# Patient Record
Sex: MALE | Race: BLACK OR AFRICAN AMERICAN | Employment: UNEMPLOYED
[De-identification: names, ages, dates, MRNs, and addresses within clinical notes are randomized per-mention and may not be internally consistent; named-entity substitution may affect disease eponyms.]

---

## 2017-01-03 RX ORDER — OXYCODONE AND ACETAMINOPHEN 7.5; 325 MG/1; MG/1
1 TABLET ORAL EVERY 6 HOURS PRN
Qty: 120 TABLET | Refills: 0 | Status: SHIPPED | OUTPATIENT
Start: 2017-01-03 | End: 2017-02-02

## 2017-01-04 ENCOUNTER — TELEPHONE (OUTPATIENT)
Dept: ONCOLOGY | Facility: CLINIC | Age: 22
End: 2017-01-04

## 2017-01-31 ENCOUNTER — OFFICE VISIT (OUTPATIENT)
Dept: ONCOLOGY | Facility: CLINIC | Age: 22
End: 2017-01-31

## 2017-01-31 ENCOUNTER — TELEPHONE (OUTPATIENT)
Dept: ONCOLOGY | Facility: CLINIC | Age: 22
End: 2017-01-31

## 2017-01-31 VITALS — TEMPERATURE: 97.9 F | HEART RATE: 69 BPM | DIASTOLIC BLOOD PRESSURE: 79 MMHG | SYSTOLIC BLOOD PRESSURE: 140 MMHG

## 2017-01-31 DIAGNOSIS — D57.219: Primary | ICD-10-CM

## 2017-01-31 DIAGNOSIS — D57.00 HB-SS DISEASE WITH CRISIS (HCC): ICD-10-CM

## 2017-01-31 PROCEDURE — 99214 OFFICE O/P EST MOD 30 MIN: CPT | Performed by: INTERNAL MEDICINE

## 2017-01-31 RX ORDER — OXYCODONE AND ACETAMINOPHEN 7.5; 325 MG/1; MG/1
1 TABLET ORAL EVERY 6 HOURS PRN
Qty: 100 TABLET | Refills: 0 | Status: CANCELLED | OUTPATIENT
Start: 2017-01-31 | End: 2017-03-02

## 2017-01-31 RX ORDER — OXYCODONE AND ACETAMINOPHEN 7.5; 325 MG/1; MG/1
1 TABLET ORAL EVERY 6 HOURS PRN
Qty: 120 TABLET | Refills: 0 | OUTPATIENT
Start: 2017-01-31 | End: 2017-03-02

## 2017-02-20 ENCOUNTER — HOSPITAL ENCOUNTER (OUTPATIENT)
Facility: MEDICAL CENTER | Age: 22
End: 2017-02-20
Payer: COMMERCIAL

## 2017-04-28 ENCOUNTER — HOSPITAL ENCOUNTER (OUTPATIENT)
Facility: MEDICAL CENTER | Age: 22
End: 2017-04-28
Payer: COMMERCIAL

## 2017-06-05 ENCOUNTER — HOSPITAL ENCOUNTER (OUTPATIENT)
Facility: MEDICAL CENTER | Age: 22
End: 2017-06-05

## 2017-06-05 DIAGNOSIS — D57.00 SICKLE CELL PAIN CRISIS (HCC): Primary | ICD-10-CM

## 2017-06-06 ENCOUNTER — TELEPHONE (OUTPATIENT)
Dept: ONCOLOGY | Age: 22
End: 2017-06-06

## 2017-08-14 ENCOUNTER — HOSPITAL ENCOUNTER (OUTPATIENT)
Facility: MEDICAL CENTER | Age: 22
End: 2017-08-14

## 2017-08-29 ENCOUNTER — HOSPITAL ENCOUNTER (OUTPATIENT)
Facility: MEDICAL CENTER | Age: 22
End: 2017-08-29

## 2018-10-23 ENCOUNTER — HOSPITAL ENCOUNTER (INPATIENT)
Age: 23
LOS: 3 days | Discharge: HOME | DRG: 139 | End: 2018-10-27
Attending: EMERGENCY MEDICINE | Admitting: INTERNAL MEDICINE
Payer: MEDICAID

## 2018-10-23 DIAGNOSIS — J18.9 PNEUMONIA DUE TO ORGANISM: Primary | ICD-10-CM

## 2018-10-23 LAB
ABSOLUTE RETIC #: 0.17 M/UL (ref 0.03–0.08)
EKG ATRIAL RATE: 69 BPM
EKG P AXIS: 60 DEGREES
EKG P-R INTERVAL: 162 MS
EKG Q-T INTERVAL: 370 MS
EKG QRS DURATION: 80 MS
EKG QTC CALCULATION (BAZETT): 378 MS
EKG R AXIS: 17 DEGREES
EKG T AXIS: 60 DEGREES
EKG VENTRICULAR RATE: 63 BPM
IMMATURE RETIC FRACT: 16.9 % (ref 2.7–18.3)
LACTIC ACID, WHOLE BLOOD: 1.4 MMOL/L (ref 0.7–2.1)
POC TROPONIN I: 0 NG/ML (ref 0–0.1)
POC TROPONIN INTERP: NORMAL
RETIC %: 3.9 % (ref 0.5–1.9)
RETIC HEMOGLOBIN: 32.7 PG (ref 28.2–35.7)

## 2018-10-23 PROCEDURE — 83615 LACTATE (LD) (LDH) ENZYME: CPT

## 2018-10-23 PROCEDURE — 84484 ASSAY OF TROPONIN QUANT: CPT

## 2018-10-23 PROCEDURE — 93005 ELECTROCARDIOGRAM TRACING: CPT

## 2018-10-23 PROCEDURE — 83605 ASSAY OF LACTIC ACID: CPT

## 2018-10-23 PROCEDURE — 2580000003 HC RX 258: Performed by: EMERGENCY MEDICINE

## 2018-10-23 PROCEDURE — 85025 COMPLETE CBC W/AUTO DIFF WBC: CPT

## 2018-10-23 PROCEDURE — 85045 AUTOMATED RETICULOCYTE COUNT: CPT

## 2018-10-23 PROCEDURE — 80048 BASIC METABOLIC PNL TOTAL CA: CPT

## 2018-10-23 PROCEDURE — 96374 THER/PROPH/DIAG INJ IV PUSH: CPT

## 2018-10-23 PROCEDURE — 6360000002 HC RX W HCPCS: Performed by: EMERGENCY MEDICINE

## 2018-10-23 PROCEDURE — 99285 EMERGENCY DEPT VISIT HI MDM: CPT

## 2018-10-23 RX ORDER — 0.9 % SODIUM CHLORIDE 0.9 %
1000 INTRAVENOUS SOLUTION INTRAVENOUS ONCE
Status: COMPLETED | OUTPATIENT
Start: 2018-10-23 | End: 2018-10-24

## 2018-10-23 RX ORDER — MORPHINE SULFATE 4 MG/ML
4 INJECTION, SOLUTION INTRAMUSCULAR; INTRAVENOUS ONCE
Status: COMPLETED | OUTPATIENT
Start: 2018-10-23 | End: 2018-10-23

## 2018-10-23 RX ADMIN — SODIUM CHLORIDE 1000 ML: 9 INJECTION, SOLUTION INTRAVENOUS at 22:45

## 2018-10-23 RX ADMIN — MORPHINE SULFATE 4 MG: 4 INJECTION INTRAVENOUS at 22:47

## 2018-10-23 ASSESSMENT — PAIN DESCRIPTION - ONSET: ONSET: GRADUAL

## 2018-10-23 ASSESSMENT — PAIN SCALES - GENERAL
PAINLEVEL_OUTOF10: 8
PAINLEVEL_OUTOF10: 8

## 2018-10-23 ASSESSMENT — PAIN DESCRIPTION - DESCRIPTORS: DESCRIPTORS: SHARP

## 2018-10-23 ASSESSMENT — PAIN DESCRIPTION - ORIENTATION: ORIENTATION: RIGHT

## 2018-10-23 ASSESSMENT — PAIN DESCRIPTION - PAIN TYPE: TYPE: ACUTE PAIN

## 2018-10-23 ASSESSMENT — PAIN DESCRIPTION - FREQUENCY: FREQUENCY: CONTINUOUS

## 2018-10-23 ASSESSMENT — PAIN DESCRIPTION - PROGRESSION: CLINICAL_PROGRESSION: GRADUALLY WORSENING

## 2018-10-23 ASSESSMENT — PAIN DESCRIPTION - LOCATION: LOCATION: CHEST;RIB CAGE

## 2018-10-24 ENCOUNTER — APPOINTMENT (OUTPATIENT)
Dept: CT IMAGING | Age: 23
DRG: 139 | End: 2018-10-24
Payer: MEDICAID

## 2018-10-24 PROBLEM — Z72.0 TOBACCO ABUSE: Status: ACTIVE | Noted: 2018-10-24

## 2018-10-24 PROBLEM — Z86.711 HISTORY OF PULMONARY EMBOLISM: Status: ACTIVE | Noted: 2018-10-24

## 2018-10-24 PROBLEM — J18.9 PNEUMONIA: Status: ACTIVE | Noted: 2018-10-24

## 2018-10-24 LAB
ABSOLUTE EOS #: 0 K/UL (ref 0–0.4)
ABSOLUTE IMMATURE GRANULOCYTE: 0 K/UL (ref 0–0.3)
ABSOLUTE LYMPH #: 1.79 K/UL (ref 1–4.8)
ABSOLUTE MONO #: 0.97 K/UL (ref 0.1–0.8)
ANION GAP SERPL CALCULATED.3IONS-SCNC: 14 MMOL/L (ref 9–17)
BASOPHILS # BLD: 1 % (ref 0–2)
BASOPHILS ABSOLUTE: 0.14 K/UL (ref 0–0.2)
BUN BLDV-MCNC: 5 MG/DL (ref 6–20)
BUN/CREAT BLD: ABNORMAL (ref 9–20)
CALCIUM SERPL-MCNC: 9.1 MG/DL (ref 8.6–10.4)
CHLORIDE BLD-SCNC: 98 MMOL/L (ref 98–107)
CO2: 25 MMOL/L (ref 20–31)
CREAT SERPL-MCNC: 0.67 MG/DL (ref 0.7–1.2)
DIFFERENTIAL TYPE: ABNORMAL
DIRECT EXAM: NORMAL
EOSINOPHILS RELATIVE PERCENT: 0 % (ref 1–4)
GFR AFRICAN AMERICAN: >60 ML/MIN
GFR NON-AFRICAN AMERICAN: >60 ML/MIN
GFR SERPL CREATININE-BSD FRML MDRD: ABNORMAL ML/MIN/{1.73_M2}
GFR SERPL CREATININE-BSD FRML MDRD: ABNORMAL ML/MIN/{1.73_M2}
GLUCOSE BLD-MCNC: 95 MG/DL (ref 70–99)
HCT VFR BLD CALC: 36.1 % (ref 40.7–50.3)
HEMOGLOBIN: 13.4 G/DL (ref 13–17)
IMMATURE GRANULOCYTES: 0 %
LACTATE DEHYDROGENASE: 345 U/L (ref 135–225)
LYMPHOCYTES # BLD: 13 % (ref 24–44)
Lab: NORMAL
MCH RBC QN AUTO: 30.9 PG (ref 25.2–33.5)
MCHC RBC AUTO-ENTMCNC: 37.1 G/DL (ref 28.4–34.8)
MCV RBC AUTO: 83.2 FL (ref 82.6–102.9)
MONOCYTES # BLD: 7 % (ref 1–7)
MORPHOLOGY: ABNORMAL
NRBC AUTOMATED: 0.5 PER 100 WBC
PDW BLD-RTO: 14.6 % (ref 11.8–14.4)
PLATELET # BLD: 195 K/UL (ref 138–453)
PLATELET ESTIMATE: ABNORMAL
PMV BLD AUTO: 12.4 FL (ref 8.1–13.5)
POC TROPONIN I: 0 NG/ML (ref 0–0.1)
POC TROPONIN INTERP: NORMAL
POTASSIUM SERPL-SCNC: 4.1 MMOL/L (ref 3.7–5.3)
RBC # BLD: 4.34 M/UL (ref 4.21–5.77)
RBC # BLD: ABNORMAL 10*6/UL
SEG NEUTROPHILS: 79 % (ref 36–66)
SEGMENTED NEUTROPHILS ABSOLUTE COUNT: 10.9 K/UL (ref 1.8–7.7)
SODIUM BLD-SCNC: 137 MMOL/L (ref 135–144)
SPECIMEN DESCRIPTION: NORMAL
STATUS: NORMAL
WBC # BLD: 13.8 K/UL (ref 3.5–11.3)
WBC # BLD: ABNORMAL 10*3/UL

## 2018-10-24 PROCEDURE — 6360000002 HC RX W HCPCS: Performed by: EMERGENCY MEDICINE

## 2018-10-24 PROCEDURE — 6370000000 HC RX 637 (ALT 250 FOR IP): Performed by: NURSE PRACTITIONER

## 2018-10-24 PROCEDURE — 99223 1ST HOSP IP/OBS HIGH 75: CPT | Performed by: INTERNAL MEDICINE

## 2018-10-24 PROCEDURE — G8989 SELF CARE D/C STATUS: HCPCS

## 2018-10-24 PROCEDURE — 2060000000 HC ICU INTERMEDIATE R&B

## 2018-10-24 PROCEDURE — 94760 N-INVAS EAR/PLS OXIMETRY 1: CPT

## 2018-10-24 PROCEDURE — 87040 BLOOD CULTURE FOR BACTERIA: CPT

## 2018-10-24 PROCEDURE — G8987 SELF CARE CURRENT STATUS: HCPCS

## 2018-10-24 PROCEDURE — 97535 SELF CARE MNGMENT TRAINING: CPT

## 2018-10-24 PROCEDURE — 71260 CT THORAX DX C+: CPT

## 2018-10-24 PROCEDURE — 2580000003 HC RX 258: Performed by: EMERGENCY MEDICINE

## 2018-10-24 PROCEDURE — 6370000000 HC RX 637 (ALT 250 FOR IP): Performed by: INTERNAL MEDICINE

## 2018-10-24 PROCEDURE — 96375 TX/PRO/DX INJ NEW DRUG ADDON: CPT

## 2018-10-24 PROCEDURE — 6360000002 HC RX W HCPCS: Performed by: NURSE PRACTITIONER

## 2018-10-24 PROCEDURE — 87205 SMEAR GRAM STAIN: CPT

## 2018-10-24 PROCEDURE — 99254 IP/OBS CNSLTJ NEW/EST MOD 60: CPT | Performed by: INTERNAL MEDICINE

## 2018-10-24 PROCEDURE — 36415 COLL VENOUS BLD VENIPUNCTURE: CPT

## 2018-10-24 PROCEDURE — 6360000002 HC RX W HCPCS: Performed by: INTERNAL MEDICINE

## 2018-10-24 PROCEDURE — 99406 BEHAV CHNG SMOKING 3-10 MIN: CPT

## 2018-10-24 PROCEDURE — 97165 OT EVAL LOW COMPLEX 30 MIN: CPT

## 2018-10-24 PROCEDURE — 2580000003 HC RX 258: Performed by: NURSE PRACTITIONER

## 2018-10-24 PROCEDURE — 84484 ASSAY OF TROPONIN QUANT: CPT

## 2018-10-24 PROCEDURE — 87070 CULTURE OTHR SPECIMN AEROBIC: CPT

## 2018-10-24 PROCEDURE — 6360000004 HC RX CONTRAST MEDICATION: Performed by: EMERGENCY MEDICINE

## 2018-10-24 PROCEDURE — 97166 OT EVAL MOD COMPLEX 45 MIN: CPT

## 2018-10-24 PROCEDURE — G8988 SELF CARE GOAL STATUS: HCPCS

## 2018-10-24 RX ORDER — GUAIFENESIN DEXTROMETHORPHAN HYDROBROMIDE ORAL SOLUTION 10; 100 MG/5ML; MG/5ML
10 SOLUTION ORAL 3 TIMES DAILY
Status: DISCONTINUED | OUTPATIENT
Start: 2018-10-24 | End: 2018-10-27 | Stop reason: HOSPADM

## 2018-10-24 RX ORDER — FENTANYL CITRATE 50 UG/ML
50 INJECTION, SOLUTION INTRAMUSCULAR; INTRAVENOUS ONCE
Status: COMPLETED | OUTPATIENT
Start: 2018-10-24 | End: 2018-10-24

## 2018-10-24 RX ORDER — HYDROCODONE BITARTRATE AND ACETAMINOPHEN 5; 325 MG/1; MG/1
1 TABLET ORAL EVERY 4 HOURS PRN
Status: DISCONTINUED | OUTPATIENT
Start: 2018-10-24 | End: 2018-10-25

## 2018-10-24 RX ORDER — SODIUM CHLORIDE 0.9 % (FLUSH) 0.9 %
10 SYRINGE (ML) INJECTION PRN
Status: DISCONTINUED | OUTPATIENT
Start: 2018-10-24 | End: 2018-10-27 | Stop reason: HOSPADM

## 2018-10-24 RX ORDER — ACETAMINOPHEN 325 MG/1
650 TABLET ORAL EVERY 4 HOURS PRN
Status: DISCONTINUED | OUTPATIENT
Start: 2018-10-24 | End: 2018-10-27 | Stop reason: HOSPADM

## 2018-10-24 RX ORDER — ALBUTEROL SULFATE 2.5 MG/3ML
2.5 SOLUTION RESPIRATORY (INHALATION)
Status: DISCONTINUED | OUTPATIENT
Start: 2018-10-24 | End: 2018-10-27 | Stop reason: HOSPADM

## 2018-10-24 RX ORDER — BISACODYL 10 MG
10 SUPPOSITORY, RECTAL RECTAL DAILY PRN
Status: DISCONTINUED | OUTPATIENT
Start: 2018-10-24 | End: 2018-10-27 | Stop reason: HOSPADM

## 2018-10-24 RX ORDER — DOCUSATE SODIUM 100 MG/1
100 CAPSULE, LIQUID FILLED ORAL 2 TIMES DAILY
Status: DISCONTINUED | OUTPATIENT
Start: 2018-10-24 | End: 2018-10-27 | Stop reason: HOSPADM

## 2018-10-24 RX ORDER — FENTANYL CITRATE 50 UG/ML
25 INJECTION, SOLUTION INTRAMUSCULAR; INTRAVENOUS
Status: DISCONTINUED | OUTPATIENT
Start: 2018-10-24 | End: 2018-10-27 | Stop reason: HOSPADM

## 2018-10-24 RX ORDER — SODIUM CHLORIDE 9 MG/ML
INJECTION, SOLUTION INTRAVENOUS CONTINUOUS
Status: DISCONTINUED | OUTPATIENT
Start: 2018-10-24 | End: 2018-10-27 | Stop reason: HOSPADM

## 2018-10-24 RX ORDER — HYDROCODONE BITARTRATE AND ACETAMINOPHEN 5; 325 MG/1; MG/1
1 TABLET ORAL EVERY 6 HOURS PRN
Status: DISCONTINUED | OUTPATIENT
Start: 2018-10-24 | End: 2018-10-24

## 2018-10-24 RX ORDER — ALBUTEROL SULFATE 2.5 MG/3ML
2.5 SOLUTION RESPIRATORY (INHALATION)
Status: DISCONTINUED | OUTPATIENT
Start: 2018-10-24 | End: 2018-10-24

## 2018-10-24 RX ORDER — ONDANSETRON 2 MG/ML
4 INJECTION INTRAMUSCULAR; INTRAVENOUS EVERY 6 HOURS PRN
Status: DISCONTINUED | OUTPATIENT
Start: 2018-10-24 | End: 2018-10-27 | Stop reason: HOSPADM

## 2018-10-24 RX ORDER — FENTANYL CITRATE 50 UG/ML
25 INJECTION, SOLUTION INTRAMUSCULAR; INTRAVENOUS EVERY 4 HOURS PRN
Status: DISCONTINUED | OUTPATIENT
Start: 2018-10-24 | End: 2018-10-24

## 2018-10-24 RX ORDER — NICOTINE 21 MG/24HR
1 PATCH, TRANSDERMAL 24 HOURS TRANSDERMAL DAILY PRN
Status: DISCONTINUED | OUTPATIENT
Start: 2018-10-24 | End: 2018-10-27 | Stop reason: HOSPADM

## 2018-10-24 RX ORDER — GUAIFENESIN DEXTROMETHORPHAN HYDROBROMIDE ORAL SOLUTION 10; 100 MG/5ML; MG/5ML
10 SOLUTION ORAL EVERY 4 HOURS PRN
Status: DISCONTINUED | OUTPATIENT
Start: 2018-10-24 | End: 2018-10-24

## 2018-10-24 RX ORDER — SODIUM CHLORIDE 0.9 % (FLUSH) 0.9 %
10 SYRINGE (ML) INJECTION EVERY 12 HOURS SCHEDULED
Status: DISCONTINUED | OUTPATIENT
Start: 2018-10-24 | End: 2018-10-27 | Stop reason: HOSPADM

## 2018-10-24 RX ORDER — IPRATROPIUM BROMIDE AND ALBUTEROL SULFATE 2.5; .5 MG/3ML; MG/3ML
1 SOLUTION RESPIRATORY (INHALATION)
Status: DISCONTINUED | OUTPATIENT
Start: 2018-10-24 | End: 2018-10-24

## 2018-10-24 RX ADMIN — IOPAMIDOL 75 ML: 755 INJECTION, SOLUTION INTRAVENOUS at 01:50

## 2018-10-24 RX ADMIN — DOCUSATE SODIUM 100 MG: 100 CAPSULE, LIQUID FILLED ORAL at 22:12

## 2018-10-24 RX ADMIN — FENTANYL CITRATE 25 MCG: 50 INJECTION INTRAMUSCULAR; INTRAVENOUS at 23:40

## 2018-10-24 RX ADMIN — FENTANYL CITRATE 50 MCG: 50 INJECTION, SOLUTION INTRAMUSCULAR; INTRAVENOUS at 00:51

## 2018-10-24 RX ADMIN — ENOXAPARIN SODIUM 40 MG: 40 INJECTION SUBCUTANEOUS at 22:12

## 2018-10-24 RX ADMIN — FENTANYL CITRATE 25 MCG: 50 INJECTION INTRAMUSCULAR; INTRAVENOUS at 20:38

## 2018-10-24 RX ADMIN — FENTANYL CITRATE 25 MCG: 50 INJECTION INTRAMUSCULAR; INTRAVENOUS at 10:30

## 2018-10-24 RX ADMIN — HYDROCODONE BITARTRATE AND ACETAMINOPHEN 1 TABLET: 5; 325 TABLET ORAL at 22:12

## 2018-10-24 RX ADMIN — HYDROCODONE BITARTRATE AND ACETAMINOPHEN 1 TABLET: 5; 325 TABLET ORAL at 18:15

## 2018-10-24 RX ADMIN — SODIUM CHLORIDE: 9 INJECTION, SOLUTION INTRAVENOUS at 04:26

## 2018-10-24 RX ADMIN — CEFTRIAXONE SODIUM 1 G: 1 INJECTION, POWDER, FOR SOLUTION INTRAMUSCULAR; INTRAVENOUS at 02:59

## 2018-10-24 RX ADMIN — Medication 10 ML: at 22:12

## 2018-10-24 RX ADMIN — FENTANYL CITRATE 25 MCG: 50 INJECTION INTRAMUSCULAR; INTRAVENOUS at 17:14

## 2018-10-24 RX ADMIN — FENTANYL CITRATE 25 MCG: 50 INJECTION INTRAMUSCULAR; INTRAVENOUS at 13:37

## 2018-10-24 RX ADMIN — ACETAMINOPHEN 650 MG: 325 TABLET ORAL at 04:39

## 2018-10-24 RX ADMIN — HYDROCODONE BITARTRATE AND ACETAMINOPHEN 1 TABLET: 5; 325 TABLET ORAL at 12:02

## 2018-10-24 RX ADMIN — DEXTROSE 500 MG: 5 SOLUTION INTRAVENOUS at 03:37

## 2018-10-24 ASSESSMENT — PAIN DESCRIPTION - PROGRESSION
CLINICAL_PROGRESSION: GRADUALLY WORSENING
CLINICAL_PROGRESSION: GRADUALLY WORSENING

## 2018-10-24 ASSESSMENT — PAIN SCALES - GENERAL
PAINLEVEL_OUTOF10: 10
PAINLEVEL_OUTOF10: 8
PAINLEVEL_OUTOF10: 10
PAINLEVEL_OUTOF10: 8
PAINLEVEL_OUTOF10: 10
PAINLEVEL_OUTOF10: 8
PAINLEVEL_OUTOF10: 8
PAINLEVEL_OUTOF10: 10

## 2018-10-24 ASSESSMENT — ENCOUNTER SYMPTOMS
ABDOMINAL PAIN: 0
WHEEZING: 0
VOMITING: 0
EYE REDNESS: 0
PHOTOPHOBIA: 0
NAUSEA: 0
DIARRHEA: 0
COUGH: 0
FACIAL SWELLING: 0
BLOOD IN STOOL: 0
SHORTNESS OF BREATH: 1
SORE THROAT: 0
CHEST TIGHTNESS: 1

## 2018-10-24 ASSESSMENT — PAIN DESCRIPTION - LOCATION
LOCATION: CHEST;BACK
LOCATION: CHEST;BACK
LOCATION: ABDOMEN;FLANK

## 2018-10-24 ASSESSMENT — PAIN DESCRIPTION - DESCRIPTORS
DESCRIPTORS: TIGHTNESS
DESCRIPTORS: TIGHTNESS;SHARP
DESCRIPTORS: DISCOMFORT;SORE;TENDER

## 2018-10-24 ASSESSMENT — PAIN DESCRIPTION - PAIN TYPE
TYPE: ACUTE PAIN

## 2018-10-24 ASSESSMENT — PAIN DESCRIPTION - ORIENTATION
ORIENTATION: RIGHT
ORIENTATION: RIGHT;UPPER

## 2018-10-24 ASSESSMENT — PAIN DESCRIPTION - FREQUENCY
FREQUENCY: CONTINUOUS
FREQUENCY: CONTINUOUS

## 2018-10-24 ASSESSMENT — PAIN DESCRIPTION - ONSET: ONSET: GRADUAL

## 2018-10-24 NOTE — ED NOTES
Pt to ED c/o sob and right sided chest/rib pain. Pt reporting the pain began last night while laying down. Pt reporting hx of PE, denies being on anticoagulation. Pt stating this pain feels similar to the pain felt with prior PE's. Pt appears to be uncomfortable and taking shallow breaths to prevent pain. Pt placed on cardiac monitor, bp cuff, and pulse ox. Pt resting on cart with call light within reach. NAD noted, RR even and NL.  Will continue to monitor     Ronnie Paz RN  10/23/18 7329

## 2018-10-24 NOTE — PROGRESS NOTES
4     1. Unable = Total/Dependent Assist  2. A Lot = Maximum/Moderate Assist  3. A Little = Minimum/Contact Guard Assist/Supervision  4. None= Modified Meagher/Independent    Raw Score Scale Score Scale Score Standard Error Approximate Degree of Functional Impairment     6 17.07 3.74 100%   7 20.13 3.68 92%   8 22.86 3.43 86%   9 25.33 3.17 80%   10 27.31 2.96 75%   11 29.04 2.79 70%   12 30.60 2.68 67%   13 32.03 2.62 63%   14 33.39 2.61 60%   15 34.69 2.65 56%   16 35.96 2.71 53%   17 37.26 2.82 50%   18 38.66 2.97 47%   19 40.22 3.20 43%   20 42.03 3.55 38%   21 44.27 4.08 33%   22 47.10 4.81 26%   23 51.12 5.88 16%   24 57.54 7.36 0%       Goals  Short term goals  Time Frame for Short term goals: OT eval and discharge d/t (I)       Therapy Time   Individual Concurrent Group Co-treatment   Time In 1340         Time Out 1413         Minutes 33            Discharge recommendations discussed with patient during initial evaluation.       RANDY Recinos/CAMELIA

## 2018-10-24 NOTE — ED NOTES
Labeled blood specimen collected and sent to lab via tube system.      Helen Curry RN  10/23/18 8759

## 2018-10-24 NOTE — PROGRESS NOTES
Pt transported to room 418 via wheelchair, stable. Dinner tray sent to new room. Report called to [facility], all questions answered.

## 2018-10-24 NOTE — H&P
16  Yes Alex Galarza MD   ibuprofen (ADVIL;MOTRIN) 800 MG tablet Take 1 tablet by mouth every 8 hours as needed for Pain 16  Yes Kwesi Bourne DO        Allergies:     Patient has no known allergies. Social History:     Tobacco:    reports that he has been smoking Cigarettes. He has a 2.00 pack-year smoking history. He does not have any smokeless tobacco history on file. Alcohol:      reports that he does not drink alcohol. Drug Use:  reports that he uses drugs about 1 time per week. Family History:     History reviewed. No pertinent family history. Review of Systems:     Positive and Negative as described in HPI.     CONSTITUTIONAL:  negative for fevers, chills, sweats, fatigue, weight loss  HEENT:  negative for vision, hearing changes, runny nose, throat pain  RESPIRATORY:  negative for shortness of breath,+ cough,   CARDIOVASCULAR:  + for chest pain on right side on breathing, denies palpitations  GASTROINTESTINAL:  negative for nausea, vomiting, diarrhea, constipation, change in bowel habits, abdominal pain   GENITOURINARY:  negative for difficulty of urination, burning with urination, frequency   INTEGUMENT:  negative for rash, skin lesions, easy bruising   HEMATOLOGIC/LYMPHATIC:  negative for swelling/edema   ALLERGIC/IMMUNOLOGIC:  negative for urticaria , itching  ENDOCRINE:  negative increase in drinking, increase in urination, hot or cold intolerance  MUSCULOSKELETAL:  negative joint pains, muscle aches, swelling of joints  NEUROLOGICAL:  negative for headaches, dizziness, lightheadedness, numbness, pain, tingling extremities  BEHAVIOR/PSYCH:  negative for depression, anxiety    Physical Exam:   /77   Pulse 63   Temp 98.4 °F (36.9 °C) (Oral)   Resp 18   Ht 6' (1.829 m)   Wt 190 lb (86.2 kg)   SpO2 97%   BMI 25.77 kg/m²   Temp (24hrs), Av.3 °F (36.8 °C), Min:97.9 °F (36.6 °C), Max:98.6 °F (37 °C)    No results for input(s): POCGLU in the last 72

## 2018-10-24 NOTE — PROGRESS NOTES
[facility] [J18.9] . Vitals:    10/24/18 0423   BP: 128/71   Pulse: 65   Resp: 18   Temp: 98.6 °F (37 °C)   SpO2: 94%   . Patients home meds are   Prior to Admission medications    Medication Sig Start Date End Date Taking? Authorizing Provider   nicotine polacrilex (NICORETTE) 2 MG gum Take 1 each by mouth as needed for Smoking cessation 11/2/16  Yes Song Ann MD   ibuprofen (ADVIL;MOTRIN) 800 MG tablet Take 1 tablet by mouth every 8 hours as needed for Pain 8/30/16  Yes Laurie Jo DO   . Recent Surgical History: None = 0     Assessment : pt denies hx COPD/Asthma, denies CPAP/BIPAP use, states he is a current smoker, denies current SOB but states it is painful to take a deep breath. RR 18  Breath Sounds: clear/dim      · Bronchodilator assessment at level  1  · Hyperinflation assessment at level   · Secretion Management assessment at level    ·   · []    Bronchodilator Assessment  BRONCHODILATOR ASSESSMENT SCORE  Score 0 1 2 3 4 5   Breath Sounds   []  Patient Baseline [x]  No Wheeze good aeration []  Faint, scattered wheezing, good aeration []  Expiratory Wheezing and or moderately diminished []  Insp/Exp wheeze and/or very diminished []  Insp/Exp and/ or marked distress   Respiratory Rate   []  Patient Baseline [x]  Less than 20 []  Less than 20 []  20-25 []  Greater than 25 []  Greater than 25   Peak flow % of Pred or PB []  NA   []  Greater than 90%  []  81-90% []  71-80% []  Less than or equal to 70%  or unable to perform []  Unable due to Respiratory Distress   Dyspnea re []  Patient Baseline [x]  No SOB []  No SOB []  SOB on exertion []  SOB min activity []  At rest/acute   e FEV% Predicted       []  NA []  Above 69%  []  Unable []  Above 60-69%  []  Unable []  Above 50-59%  []  Unable []  Above 35-49%  []  Unable []  Less than 35%  []  Unable            The Respiratory Therapy Department completed the Respiratory Care evaluation.  No

## 2018-10-24 NOTE — ED NOTES
Pt. Resting on stretcher, eyes open, RR even and non-labored  Pt.  Updated on POC  Will continue to monitor        Hiram Li RN  10/24/18 0021

## 2018-10-24 NOTE — ED PROVIDER NOTES
[facility]     Emergency Department     Faculty Attestation    I performed a history and physical examination of the patient and discussed management with the resident. I reviewed the residents note and agree with the documented findings including all diagnostic interpretations and plan of care. Any areas of disagreement are noted on the chart. I was personally present for the key portions of any procedures. I have documented in the chart those procedures where I was not present during the key portions. I have reviewed the emergency nurses triage note. I agree with the chief complaint, past medical history, past surgical history, allergies, medications, social and family history as documented unless otherwise noted below. Documentation of the HPI, Physical Exam and Medical Decision Making performed by [email or web address] is based on my personal performance of the HPI, PE and MDM. For Physician Assistant/ Nurse Practitioner cases/documentation I have personally evaluated this patient and have completed at least one if not all key elements of the E/M (history, physical exam, and MDM). Additional findings are as noted. Primary Care Physician: No primary care provider on file. History: This is a 21 y.o. male who presents to the Emergency Department with complaint of chest pain. Right-sided. History of pulmonary Gorman Williams. Has a history of sickle cell C disease. Not currently taking any medications for this, review of last oncology no shows and they do not recommend hydroxyurea or folic acid for him. No fevers. Does report some shortness of breath with this. Not currently on anticoagulation. Physical:     height is 6' (1.829 m) and weight is 190 lb (86.2 kg). His oral temperature is 97.9 °F (36.6 °C). His pulse is 68. His respiration is 18 and oxygen saturation is 100%.     21 y.o. male appears uncomfortable, splinting respirations, cardiac exam regular rate and rhythm no murmurs rubs gallops, pulmonary clear bilaterally, abdomen soft nontender nondistended.     Impression: Chest pain shortness of breath, pulmonary embolism versus acute chest    Plan: Analgesia, fluids, sickle cell labs, CT PE    EKG Interpretation  EKG Interpretation    Interpreted by emergency department physician    Rhythm: sinus arrhythmia  Rate: normal  Axis: normal  Ectopy: none  Conduction: normal  ST Segments: normal  T Waves: normal  Q Waves: none    EKG  Impression: sinus arrhythmia    Gill Morales MD      Interpreted by me      Sharyn Venegas MD  Attending Emergency Physician        Gill Morales MD  10/23/18 5814

## 2018-10-24 NOTE — CONSULTS
does not have any smokeless tobacco history on file. He reports that he uses drugs about 1 time per week. He reports that he does not drink alcohol. Family History: family history is not on file. Family history was reviewed and no pertinent family history noted. REVIEW OF SYSTEMS:    Constitutional: No fever or chills. No night sweats, no weight loss   Eyes: No eye discharge, double vision, or eye pain   HEENT: negative for sore mouth, sore throat, hoarseness and voice change   Respiratory: + cough , sputum, dyspnea, wheezing, hemoptysis,+ chest pain   Cardiovascular: negative for chest pain, dyspnea, palpitations, orthopnea, PND   Gastrointestinal: negative for nausea, vomiting, diarrhea, constipation, abdominal pain, Dysphagia, hematemesis and hematochezia   Genitourinary: negative for frequency, dysuria, nocturia, urinary incontinence, and hematuria   Integument: negative for rash, skin lesions, bruises.    Hematologic/Lymphatic: negative for easy bruising, bleeding, lymphadenopathy, or petechiae   Endocrine: negative for heat or cold intolerance,weight changes, change in bowel habits and hair loss   Musculoskeletal: negative for myalgias, arthralgias, pain, joint swelling,and bone pain   Neurological: negative for headaches, dizziness, seizures, weakness, numbness    PHYSICAL EXAM:      /83   Pulse 76   Temp 98.8 °F (37.1 °C)   Resp 18   Ht 6' (1.829 m)   Wt 190 lb (86.2 kg)   SpO2 96%   BMI 25.77 kg/m²    Temp (24hrs), Av.4 °F (36.9 °C), Min:97.9 °F (36.6 °C), Max:98.8 °F (37.1 °C)    General appearance - well appearing, no in pain or distress   Mental status - alert and cooperative   Eyes - pupils equal and reactive, extraocular eye movements intact   Ears - bilateral TM's and external ear canals normal   Mouth - mucous membranes moist, pharynx normal without lesions   Neck - supple, no significant adenopathy   Lymphatics - no palpable lymphadenopathy, no hepatosplenomegaly   Chest -

## 2018-10-24 NOTE — ED PROVIDER NOTES
consistent with pneumonia. Repeat vital signs show respiratory rate within normal limits. No suspicion of sepsis at this time. Rocephin and azithromycin given IV. Decision to admit made and Intermed consulted. Patient accepted and transported to floor in stable condition. OUTSTANDING TASKS / RECOMMENDATIONS:    1. Pain control, symptomatic management  2. Monitor results of CT chest  3. Admit pending imaging results     FINAL IMPRESSION:     1. Pneumonia due to organism        DISPOSITION:         DISPOSITION:  []  Discharge   []  Transfer -    [x]  Admission - Intermed    []  Against Medical Advice   []  Eloped   FOLLOW-UP: No follow-up provider specified.    DISCHARGE MEDICATIONS: New Prescriptions    No medications on file          Ray Allen MD  Emergency Medicine Resident  [facility]     Ray Allen MD  10/24/18 4532

## 2018-10-24 NOTE — ED PROVIDER NOTES
Other Topics Concern    Not on file     Social History Narrative    No narrative on file       History reviewed. No pertinent family history. Allergies:  Patient has no known allergies. Home Medications:  Prior to Admission medications    Medication Sig Start Date End Date Taking? Authorizing Provider   nicotine polacrilex (NICORETTE) 2 MG gum Take 1 each by mouth as needed for Smoking cessation 11/2/16   Gilles Dominique MD   ibuprofen (ADVIL;MOTRIN) 800 MG tablet Take 1 tablet by mouth every 8 hours as needed for Pain 8/30/16   Ernie Bourne,        REVIEW OF SYSTEMS    (2-9 systems for level 4, 10 or more for level 5)      Review of Systems   Constitutional: Negative for chills, diaphoresis, fatigue and fever. HENT: Negative for facial swelling, nosebleeds and sore throat. Eyes: Negative for photophobia, redness and visual disturbance. Respiratory: Positive for chest tightness and shortness of breath. Negative for cough and wheezing. Cardiovascular: Positive for chest pain. Negative for palpitations and leg swelling. Gastrointestinal: Negative for abdominal pain, blood in stool, diarrhea, nausea and vomiting. Genitourinary: Negative for dysuria and hematuria. Musculoskeletal: Negative for joint swelling, neck pain and neck stiffness. Skin: Negative for pallor and rash. Neurological: Negative for syncope, weakness, light-headedness and headaches. Hematological: Negative for adenopathy. Psychiatric/Behavioral: Negative for behavioral problems and confusion. The patient is not nervous/anxious. All other systems reviewed and are negative. PHYSICAL EXAM   (up to 7 for level 4, 8 or more for level 5)      INITIAL VITALS:   There were no vitals taken for this visit. Physical Exam   Constitutional: He is oriented to person, place, and time. He appears well-developed and well-nourished. No distress. HENT:   Head: Normocephalic and atraumatic.    Mouth/Throat: with chest pain shortness of breath. Chest pain is pleuritic in nature and similar past PE. He does have right-sided tenderness as well. Vital signs within normal limits. He is saturating normally is slightly tachypnea. Plan is for CT chest, labs, pain control EKG and reassessment. RADIOLOGY:  None    EKG  EKG Interpretation    Interpreted by me    Rhythm: normal sinus   Rate: normal  Axis: normal  Ectopy: none  Conduction: normal  ST Segments: no acute change  T Waves: no acute change  Q Waves: none    Clinical Impression: no acute changes and normal EKG    All EKG's are interpreted by the Emergency Department Physician who either signs or Co-signs this chart in the absence of a cardiologist.    EMERGENCY DEPARTMENT COURSE:  Labs showed leukocytosis otherwise within normal limits. Expected elevation of reticulocytes. Patient's pain is improved after morphine and fentanyl. CT is pending. Pending CT scan pain is controlled and CT is negative, patient can go home. If CT shows evidence of PE E, we'll plan to heparinize patient admitted to internal medicine. 1:48 AM  signed out to Dr. Brian Mccord. PROCEDURES:  None    CONSULTS:  None    CRITICAL CARE:  None    FINAL IMPRESSION      No diagnosis found. DISPOSITION / PLAN     DISPOSITION        PATIENT REFERRED TO:  No follow-up provider specified. DISCHARGE MEDICATIONS:  New Prescriptions    No medications on file       Beth Gooden DO  Emergency Medicine Resident    (Please note that portions of thisnote were completed with a voice recognition program.  Efforts were made to edit the dictations but occasionally words are mis-transcribed. )       Beth Gooden DO  Resident  10/24/18 9143

## 2018-10-25 ENCOUNTER — APPOINTMENT (OUTPATIENT)
Dept: GENERAL RADIOLOGY | Age: 23
DRG: 139 | End: 2018-10-25
Payer: MEDICAID

## 2018-10-25 LAB
ANION GAP SERPL CALCULATED.3IONS-SCNC: 11 MMOL/L (ref 9–17)
BUN BLDV-MCNC: 4 MG/DL (ref 6–20)
BUN/CREAT BLD: ABNORMAL (ref 9–20)
CALCIUM SERPL-MCNC: 8.7 MG/DL (ref 8.6–10.4)
CHLORIDE BLD-SCNC: 99 MMOL/L (ref 98–107)
CO2: 26 MMOL/L (ref 20–31)
CREAT SERPL-MCNC: 0.62 MG/DL (ref 0.7–1.2)
GFR AFRICAN AMERICAN: >60 ML/MIN
GFR NON-AFRICAN AMERICAN: >60 ML/MIN
GFR SERPL CREATININE-BSD FRML MDRD: ABNORMAL ML/MIN/{1.73_M2}
GFR SERPL CREATININE-BSD FRML MDRD: ABNORMAL ML/MIN/{1.73_M2}
GLUCOSE BLD-MCNC: 90 MG/DL (ref 70–99)
HAPTOGLOBIN: <10 MG/DL (ref 30–200)
HCT VFR BLD CALC: 35.4 % (ref 40.7–50.3)
HEMOGLOBIN: 12.9 G/DL (ref 13–17)
LACTATE DEHYDROGENASE: 366 U/L (ref 135–225)
MCH RBC QN AUTO: 30.5 PG (ref 25.2–33.5)
MCHC RBC AUTO-ENTMCNC: 36.4 G/DL (ref 28.4–34.8)
MCV RBC AUTO: 83.7 FL (ref 82.6–102.9)
NRBC AUTOMATED: 0.4 PER 100 WBC
PDW BLD-RTO: 14.6 % (ref 11.8–14.4)
PLATELET # BLD: 184 K/UL (ref 138–453)
PMV BLD AUTO: 12.8 FL (ref 8.1–13.5)
POTASSIUM SERPL-SCNC: 4 MMOL/L (ref 3.7–5.3)
RBC # BLD: 4.23 M/UL (ref 4.21–5.77)
SODIUM BLD-SCNC: 136 MMOL/L (ref 135–144)
WBC # BLD: 15.2 K/UL (ref 3.5–11.3)

## 2018-10-25 PROCEDURE — 6370000000 HC RX 637 (ALT 250 FOR IP): Performed by: NURSE PRACTITIONER

## 2018-10-25 PROCEDURE — 1200000000 HC SEMI PRIVATE

## 2018-10-25 PROCEDURE — 6370000000 HC RX 637 (ALT 250 FOR IP): Performed by: INTERNAL MEDICINE

## 2018-10-25 PROCEDURE — 85027 COMPLETE CBC AUTOMATED: CPT

## 2018-10-25 PROCEDURE — 6360000002 HC RX W HCPCS: Performed by: INTERNAL MEDICINE

## 2018-10-25 PROCEDURE — 2580000003 HC RX 258: Performed by: INTERNAL MEDICINE

## 2018-10-25 PROCEDURE — 83615 LACTATE (LD) (LDH) ENZYME: CPT

## 2018-10-25 PROCEDURE — 80048 BASIC METABOLIC PNL TOTAL CA: CPT

## 2018-10-25 PROCEDURE — 71046 X-RAY EXAM CHEST 2 VIEWS: CPT

## 2018-10-25 PROCEDURE — 94761 N-INVAS EAR/PLS OXIMETRY MLT: CPT

## 2018-10-25 PROCEDURE — 99232 SBSQ HOSP IP/OBS MODERATE 35: CPT | Performed by: INTERNAL MEDICINE

## 2018-10-25 PROCEDURE — 83010 ASSAY OF HAPTOGLOBIN QUANT: CPT

## 2018-10-25 PROCEDURE — 36415 COLL VENOUS BLD VENIPUNCTURE: CPT

## 2018-10-25 PROCEDURE — 6360000002 HC RX W HCPCS: Performed by: NURSE PRACTITIONER

## 2018-10-25 PROCEDURE — 85045 AUTOMATED RETICULOCYTE COUNT: CPT

## 2018-10-25 PROCEDURE — 2580000003 HC RX 258: Performed by: NURSE PRACTITIONER

## 2018-10-25 RX ORDER — OXYCODONE HYDROCHLORIDE AND ACETAMINOPHEN 5; 325 MG/1; MG/1
2 TABLET ORAL EVERY 4 HOURS PRN
Status: DISCONTINUED | OUTPATIENT
Start: 2018-10-25 | End: 2018-10-27 | Stop reason: HOSPADM

## 2018-10-25 RX ORDER — OXYCODONE HYDROCHLORIDE AND ACETAMINOPHEN 5; 325 MG/1; MG/1
1 TABLET ORAL EVERY 4 HOURS PRN
Status: DISCONTINUED | OUTPATIENT
Start: 2018-10-25 | End: 2018-10-27 | Stop reason: HOSPADM

## 2018-10-25 RX ADMIN — DOCUSATE SODIUM 100 MG: 100 CAPSULE, LIQUID FILLED ORAL at 08:42

## 2018-10-25 RX ADMIN — Medication 10 ML: at 20:11

## 2018-10-25 RX ADMIN — Medication 10 ML: at 08:39

## 2018-10-25 RX ADMIN — SODIUM CHLORIDE: 9 INJECTION, SOLUTION INTRAVENOUS at 04:17

## 2018-10-25 RX ADMIN — FENTANYL CITRATE 25 MCG: 50 INJECTION INTRAMUSCULAR; INTRAVENOUS at 16:48

## 2018-10-25 RX ADMIN — FENTANYL CITRATE 25 MCG: 50 INJECTION INTRAMUSCULAR; INTRAVENOUS at 20:43

## 2018-10-25 RX ADMIN — FENTANYL CITRATE 25 MCG: 50 INJECTION INTRAMUSCULAR; INTRAVENOUS at 12:08

## 2018-10-25 RX ADMIN — CEFTRIAXONE SODIUM 1 G: 1 INJECTION, POWDER, FOR SOLUTION INTRAMUSCULAR; INTRAVENOUS at 04:17

## 2018-10-25 RX ADMIN — OXYCODONE HYDROCHLORIDE AND ACETAMINOPHEN 2 TABLET: 5; 325 TABLET ORAL at 23:27

## 2018-10-25 RX ADMIN — SODIUM CHLORIDE: 9 INJECTION, SOLUTION INTRAVENOUS at 20:46

## 2018-10-25 RX ADMIN — DOCUSATE SODIUM 100 MG: 100 CAPSULE, LIQUID FILLED ORAL at 20:11

## 2018-10-25 RX ADMIN — DICLOFENAC 2 G: 10 GEL TOPICAL at 20:11

## 2018-10-25 RX ADMIN — AZITHROMYCIN MONOHYDRATE 500 MG: 500 INJECTION, POWDER, LYOPHILIZED, FOR SOLUTION INTRAVENOUS at 02:26

## 2018-10-25 RX ADMIN — FENTANYL CITRATE 25 MCG: 50 INJECTION INTRAMUSCULAR; INTRAVENOUS at 05:32

## 2018-10-25 RX ADMIN — DICLOFENAC 2 G: 10 GEL TOPICAL at 11:10

## 2018-10-25 RX ADMIN — OXYCODONE HYDROCHLORIDE AND ACETAMINOPHEN 2 TABLET: 5; 325 TABLET ORAL at 11:09

## 2018-10-25 RX ADMIN — HYDROCODONE BITARTRATE AND ACETAMINOPHEN 1 TABLET: 5; 325 TABLET ORAL at 02:26

## 2018-10-25 RX ADMIN — SODIUM CHLORIDE: 9 INJECTION, SOLUTION INTRAVENOUS at 12:09

## 2018-10-25 RX ADMIN — FENTANYL CITRATE 25 MCG: 50 INJECTION INTRAMUSCULAR; INTRAVENOUS at 02:26

## 2018-10-25 RX ADMIN — HYDROCODONE BITARTRATE AND ACETAMINOPHEN 1 TABLET: 5; 325 TABLET ORAL at 07:13

## 2018-10-25 RX ADMIN — OXYCODONE HYDROCHLORIDE AND ACETAMINOPHEN 2 TABLET: 5; 325 TABLET ORAL at 19:17

## 2018-10-25 RX ADMIN — Medication 10 ML: at 15:11

## 2018-10-25 RX ADMIN — ENOXAPARIN SODIUM 40 MG: 40 INJECTION SUBCUTANEOUS at 08:43

## 2018-10-25 RX ADMIN — OXYCODONE HYDROCHLORIDE AND ACETAMINOPHEN 2 TABLET: 5; 325 TABLET ORAL at 15:09

## 2018-10-25 RX ADMIN — FENTANYL CITRATE 25 MCG: 50 INJECTION INTRAMUSCULAR; INTRAVENOUS at 08:39

## 2018-10-25 ASSESSMENT — PAIN DESCRIPTION - FREQUENCY
FREQUENCY: CONTINUOUS

## 2018-10-25 ASSESSMENT — PAIN SCALES - GENERAL
PAINLEVEL_OUTOF10: 5
PAINLEVEL_OUTOF10: 8
PAINLEVEL_OUTOF10: 6
PAINLEVEL_OUTOF10: 8
PAINLEVEL_OUTOF10: 7
PAINLEVEL_OUTOF10: 4
PAINLEVEL_OUTOF10: 8
PAINLEVEL_OUTOF10: 10
PAINLEVEL_OUTOF10: 8
PAINLEVEL_OUTOF10: 8
PAINLEVEL_OUTOF10: 5
PAINLEVEL_OUTOF10: 7
PAINLEVEL_OUTOF10: 8
PAINLEVEL_OUTOF10: 6
PAINLEVEL_OUTOF10: 8
PAINLEVEL_OUTOF10: 8
PAINLEVEL_OUTOF10: 4
PAINLEVEL_OUTOF10: 8

## 2018-10-25 ASSESSMENT — PAIN DESCRIPTION - PROGRESSION
CLINICAL_PROGRESSION: NOT CHANGED
CLINICAL_PROGRESSION: GRADUALLY IMPROVING

## 2018-10-25 ASSESSMENT — PAIN DESCRIPTION - ONSET: ONSET: ON-GOING

## 2018-10-25 ASSESSMENT — PAIN DESCRIPTION - ORIENTATION
ORIENTATION: RIGHT

## 2018-10-25 ASSESSMENT — PAIN DESCRIPTION - DESCRIPTORS
DESCRIPTORS: ACHING;SHARP
DESCRIPTORS: ACHING;SHARP
DESCRIPTORS: ACHING

## 2018-10-25 ASSESSMENT — PAIN DESCRIPTION - LOCATION
LOCATION: CHEST;RIB CAGE
LOCATION: CHEST
LOCATION: CHEST;RIB CAGE

## 2018-10-25 ASSESSMENT — PAIN DESCRIPTION - PAIN TYPE
TYPE: ACUTE PAIN

## 2018-10-25 NOTE — PROGRESS NOTES
[facility]    Progress Note    10/25/2018    11:25 AM    Name:   Mariella Scruggs  MRN:     [id number]     Acct:      [de-identified]   Room:   [address] [place] [address] Day:  1  Admit Date:  10/23/2018 10:21 PM    PCP:   No primary care provider on file. Code Status:  Full Code    Subjective:     C/C:   Chief Complaint   Patient presents with    Shortness of Breath    Rib Pain     Interval History Status:  T-max 100.2  Still has significant pain on right side of the chest and right rib cage on back  Cultures are still pending    Brief History:   Brett Griffith is a 21 y. o. male who presents with Chest pain shortness of breath.  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  He states he is a prior history of PE. [place] states it hurts taking a deep breath he has right-sided pain.  Denies any hemoptysis.  Patient states he is not had acute chest syndrome in the past.  Denies any recent leg swelling, surgeries or cancer.  Currently patient is not undergoing any treatment for his sickle cell disease. He is active smoker  Had priapism in the past for which surgical intervention was done with aspiration      Review of Systems:     Constitutional:  negative for chills, fevers, sweats  Respiratory:  + for cough,+ right-sided chest pain on breathing and right-sided rib cage pain on the back Denies dyspnea on exertion, hemoptysis, shortness of breath, wheezing  Cardiovascular:  negative for precordial pain, chest pressure/discomfort, lower extremity edema, palpitations  Gastrointestinal:  negative for abdominal pain, constipation, diarrhea, nausea, vomiting  Neurological:  negative for dizziness, headache    Medications:      Allergies:  No Known Allergies    Current Meds:   Scheduled Meds:    diclofenac sodium  2 g Topical BID    sodium chloride flush  10 mL Intravenous 2 times per day    docusate sodium  100 mg Oral BID    enoxaparin  40 mg Subcutaneous Daily    azithromycin  500 mg Intravenous Q24H    And    cefTRIAXone (ROCEPHIN) IV  1 g Intravenous Q24H    dextromethorphan-guaiFENesin  10 mL Oral TID     Continuous Infusions:    sodium chloride 125 mL/hr at 10/25/18 0417     PRN Meds: oxyCODONE-acetaminophen, oxyCODONE-acetaminophen, nicotine polacrilex, sodium chloride flush, magnesium hydroxide, bisacodyl, ondansetron, nicotine, albuterol, acetaminophen, fentanNYL    Data:     Past Medical History:   has a past medical history of History of blood transfusion; Sickle cell anemia ([facility] [address] Sickle cell disease, type SC ([facility] [address] and Unspecified diseases of blood and blood-forming organs. Social History:   reports that he has been smoking Cigarettes. He has a 2.00 pack-year smoking history. He does not have any smokeless tobacco history on file. He reports that he uses drugs about 1 time per week. He reports that he does not drink alcohol. Family History: History reviewed. No pertinent family history. Vitals:  /84   Pulse 80   Temp 100.2 °F (37.9 °C) (Oral)   Resp (!) 39   Ht 6' (1.829 m)   Wt 191 lb (86.6 kg)   SpO2 94%   BMI 25.90 kg/m²   Temp (24hrs), Av °F (37.2 °C), Min:98.3 °F (36.8 °C), Max:100.2 °F (37.9 °C)    No results for input(s): POCGLU in the last 72 hours. I/O (24Hr):     Intake/Output Summary (Last 24 hours) at 10/25/18 1125  Last data filed at 10/25/18 0500   Gross per 24 hour   Intake             3190 ml   Output                0 ml   Net             3190 ml       Labs:    Hematology:  Recent Labs      10/23/18   2242  10/25/18   0602   WBC  13.8*  15.2*   HGB  13.4  12.9*   HCT  36.1*  35.4*   PLT  195  184     Chemistry:  Recent Labs      10/23/18   2234  10/23/18   2242  10/24/18   0055  10/25/18   0602   NA   --   137   --   136   K   --   4.1   --   4.0   CL   --   98   --   99   CO2   --   25   --   26   GLUCOSE   --   95   --   90   BUN   --   5*   --   4*   CREATININE   --   0.67*   --   0.62*

## 2018-10-25 NOTE — PLAN OF CARE
Problem: Pain:  Goal: Pain level will decrease  Pain level will decrease   Outcome: Ongoing  Pt's pain assessed frequently with hourly rounding; assessed all pain characteristics including level, type, location, frequency, and onset. Pt medicated by RN per PRN orders. Non-pharmacologic interventions offered to pt as well. Pt states pain is tolerable at this time. Will continue to monitor. Goal: Control of acute pain  Control of acute pain   Outcome: Ongoing  Pt's pain assessed frequently with hourly rounding; assessed all pain characteristics including level, type, location, frequency, and onset. Pt medicated by RN per PRN orders. Non-pharmacologic interventions offered to pt as well. Pt states pain is tolerable at this time. Will continue to monitor. Goal: Control of chronic pain  Control of chronic pain   Outcome: Ongoing  Pt's pain assessed frequently with hourly rounding; assessed all pain characteristics including level, type, location, frequency, and onset. Pt medicated by RN per PRN orders. Non-pharmacologic interventions offered to pt as well. Pt states pain is tolerable at this time. Will continue to monitor.

## 2018-10-26 LAB
ABSOLUTE RETIC #: 0.17 M/UL (ref 0.02–0.1)
ABSOLUTE RETIC #: 0.18 M/UL (ref 0.03–0.08)
CULTURE: ABNORMAL
DIRECT EXAM: ABNORMAL
IMMATURE RETIC FRACT: 7.9 % (ref 2.7–18.3)
IMMATURE RETIC FRACT: 9.3 % (ref 2.7–18.3)
Lab: ABNORMAL
RETIC %: 4.4 % (ref 0.5–1.9)
RETIC %: 5 % (ref 0.5–1.9)
RETIC HEMOGLOBIN: 27.7 PG (ref 28.2–35.7)
RETIC HEMOGLOBIN: 29.5 PG (ref 28.2–35.7)
SPECIMEN DESCRIPTION: ABNORMAL
STATUS: ABNORMAL

## 2018-10-26 PROCEDURE — 36415 COLL VENOUS BLD VENIPUNCTURE: CPT

## 2018-10-26 PROCEDURE — 6370000000 HC RX 637 (ALT 250 FOR IP): Performed by: NURSE PRACTITIONER

## 2018-10-26 PROCEDURE — 6360000002 HC RX W HCPCS: Performed by: INTERNAL MEDICINE

## 2018-10-26 PROCEDURE — 2580000003 HC RX 258: Performed by: NURSE PRACTITIONER

## 2018-10-26 PROCEDURE — 6360000002 HC RX W HCPCS: Performed by: NURSE PRACTITIONER

## 2018-10-26 PROCEDURE — 85045 AUTOMATED RETICULOCYTE COUNT: CPT

## 2018-10-26 PROCEDURE — 99232 SBSQ HOSP IP/OBS MODERATE 35: CPT | Performed by: INTERNAL MEDICINE

## 2018-10-26 PROCEDURE — 6370000000 HC RX 637 (ALT 250 FOR IP): Performed by: INTERNAL MEDICINE

## 2018-10-26 PROCEDURE — 1200000000 HC SEMI PRIVATE

## 2018-10-26 RX ORDER — DIPHENHYDRAMINE HYDROCHLORIDE 50 MG/ML
25 INJECTION INTRAMUSCULAR; INTRAVENOUS EVERY 6 HOURS PRN
Status: DISCONTINUED | OUTPATIENT
Start: 2018-10-26 | End: 2018-10-27 | Stop reason: HOSPADM

## 2018-10-26 RX ADMIN — Medication 10 ML: at 21:00

## 2018-10-26 RX ADMIN — OXYCODONE HYDROCHLORIDE AND ACETAMINOPHEN 2 TABLET: 5; 325 TABLET ORAL at 21:00

## 2018-10-26 RX ADMIN — FENTANYL CITRATE 25 MCG: 50 INJECTION INTRAMUSCULAR; INTRAVENOUS at 09:17

## 2018-10-26 RX ADMIN — ENOXAPARIN SODIUM 40 MG: 40 INJECTION SUBCUTANEOUS at 08:39

## 2018-10-26 RX ADMIN — FENTANYL CITRATE 25 MCG: 50 INJECTION INTRAMUSCULAR; INTRAVENOUS at 14:33

## 2018-10-26 RX ADMIN — ONDANSETRON 4 MG: 2 INJECTION INTRAMUSCULAR; INTRAVENOUS at 12:11

## 2018-10-26 RX ADMIN — Medication 10 ML: at 08:39

## 2018-10-26 RX ADMIN — AZITHROMYCIN MONOHYDRATE 500 MG: 500 INJECTION, POWDER, LYOPHILIZED, FOR SOLUTION INTRAVENOUS at 02:47

## 2018-10-26 RX ADMIN — CEFTRIAXONE SODIUM 1 G: 1 INJECTION, POWDER, FOR SOLUTION INTRAMUSCULAR; INTRAVENOUS at 04:58

## 2018-10-26 RX ADMIN — DICLOFENAC 2 G: 10 GEL TOPICAL at 21:39

## 2018-10-26 RX ADMIN — FENTANYL CITRATE 25 MCG: 50 INJECTION INTRAMUSCULAR; INTRAVENOUS at 22:17

## 2018-10-26 RX ADMIN — DOCUSATE SODIUM 100 MG: 100 CAPSULE, LIQUID FILLED ORAL at 21:39

## 2018-10-26 RX ADMIN — OXYCODONE HYDROCHLORIDE AND ACETAMINOPHEN 2 TABLET: 5; 325 TABLET ORAL at 12:12

## 2018-10-26 RX ADMIN — OXYCODONE HYDROCHLORIDE AND ACETAMINOPHEN 2 TABLET: 5; 325 TABLET ORAL at 07:21

## 2018-10-26 RX ADMIN — DOCUSATE SODIUM 100 MG: 100 CAPSULE, LIQUID FILLED ORAL at 08:39

## 2018-10-26 RX ADMIN — DIPHENHYDRAMINE HYDROCHLORIDE 25 MG: 50 INJECTION, SOLUTION INTRAMUSCULAR; INTRAVENOUS at 07:22

## 2018-10-26 RX ADMIN — FENTANYL CITRATE 25 MCG: 50 INJECTION INTRAMUSCULAR; INTRAVENOUS at 18:38

## 2018-10-26 RX ADMIN — Medication 10 ML: at 14:33

## 2018-10-26 RX ADMIN — OXYCODONE HYDROCHLORIDE AND ACETAMINOPHEN 2 TABLET: 5; 325 TABLET ORAL at 03:24

## 2018-10-26 RX ADMIN — OXYCODONE HYDROCHLORIDE AND ACETAMINOPHEN 2 TABLET: 5; 325 TABLET ORAL at 17:32

## 2018-10-26 RX ADMIN — FENTANYL CITRATE 25 MCG: 50 INJECTION INTRAMUSCULAR; INTRAVENOUS at 04:27

## 2018-10-26 RX ADMIN — DICLOFENAC 2 G: 10 GEL TOPICAL at 08:40

## 2018-10-26 RX ADMIN — FENTANYL CITRATE 25 MCG: 50 INJECTION INTRAMUSCULAR; INTRAVENOUS at 00:26

## 2018-10-26 ASSESSMENT — PAIN SCALES - GENERAL
PAINLEVEL_OUTOF10: 8
PAINLEVEL_OUTOF10: 6
PAINLEVEL_OUTOF10: 7
PAINLEVEL_OUTOF10: 6
PAINLEVEL_OUTOF10: 7
PAINLEVEL_OUTOF10: 9
PAINLEVEL_OUTOF10: 7
PAINLEVEL_OUTOF10: 6
PAINLEVEL_OUTOF10: 6
PAINLEVEL_OUTOF10: 7
PAINLEVEL_OUTOF10: 7
PAINLEVEL_OUTOF10: 6
PAINLEVEL_OUTOF10: 5
PAINLEVEL_OUTOF10: 6
PAINLEVEL_OUTOF10: 8
PAINLEVEL_OUTOF10: 7
PAINLEVEL_OUTOF10: 10

## 2018-10-26 ASSESSMENT — PAIN DESCRIPTION - LOCATION
LOCATION: CHEST

## 2018-10-26 ASSESSMENT — ENCOUNTER SYMPTOMS
SHORTNESS OF BREATH: 0
ABDOMINAL DISTENTION: 0
NAUSEA: 0
RECTAL PAIN: 0
WHEEZING: 0
COUGH: 0

## 2018-10-26 ASSESSMENT — PAIN DESCRIPTION - DESCRIPTORS: DESCRIPTORS: ACHING

## 2018-10-26 ASSESSMENT — PAIN DESCRIPTION - FREQUENCY
FREQUENCY: CONTINUOUS

## 2018-10-26 ASSESSMENT — PAIN DESCRIPTION - ORIENTATION
ORIENTATION: RIGHT

## 2018-10-26 ASSESSMENT — PAIN DESCRIPTION - PAIN TYPE
TYPE: ACUTE PAIN

## 2018-10-26 NOTE — PLAN OF CARE
Problem: Pain:  Goal: Control of acute pain  Control of acute pain   Outcome: Ongoing  Pain level assessment complete. Pt rated right chest pain on 0-10 scale. Pt educated on pain scale and control interventions. PRN pain medication given per pt request. Pt verbalizes understanding to call out with new onset of pain or unrelieved pain. Will continue to monitor. Problem: Gas Exchange - Impaired:  Goal: Levels of oxygenation will improve  Levels of oxygenation will improve  Outcome: Ongoing  Patients respiratory status stable at this time. No s/s distress or cyanosis noted. Respirations even, easy and regular. Nasal canula 02 in place as needed to maintain sp02>90% or per md order. Sp02 checks with  vitals assessment or as needed per pt status. Will continue to monitor.

## 2018-10-26 NOTE — PROGRESS NOTES
[facility]    Progress Note    10/26/2018    4:35 PM    Name:   Klarissa Ventura  MRN:     [id number]     Acct:      [de-identified]   Room:   [address] [place] [address] Day:  2  Admit Date:  10/23/2018 10:21 PM    PCP:   No primary care provider on file. Code Status:  Full Code    Subjective:     C/C:   Chief Complaint   Patient presents with    Shortness of Breath    Rib Pain     Interval History Status:    Somnolent  Patient reports his pain is unchanged  He states he's having 10/10 pain primarily overlying the right thorax  He has not been eating well / poor appetite and intake  Cultures remain negative  T-max 100.2    Database updates:  Haptoglobin <10 (L)   (H)  Retic % 4.4 (H)    Chest x-ray 10/25:  Impression:        Bibasilar opacities right greater than left consistent with airspace disease. CTA 10/24:  Impression:        No evidence of pulmonary embolism. Multifocal airspace disease compatible with pneumonia. Brief History:     As documented in the medical record:  \"Brett Griffith is a 21 y.o. male who presents with Chest pain shortness of breath. 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 He states he is a prior history of PE. He states it hurts taking a deep breath he has right-sided pain. Denies any hemoptysis. Patient states he is not had acute chest syndrome in the past.  Denies any recent leg swelling, surgeries or cancer. Currently patient is not undergoing any treatment for his sickle cell disease. He is active smoker  Had priapism in the past for which surgical intervention was done with aspiration\"    Chest x-ray 10/25:  Impression:        Bibasilar opacities right greater than left consistent with airspace disease. CTA 10/24:  Impression:        No evidence of pulmonary embolism. Multifocal airspace disease compatible with pneumonia.      The patient was admitted  Hydration was begun  Pain control was undertaken  Hematology was consulted  Antibiotics were ordered  Respiratory therapy was initiated    Past Medical History:   has a past medical history of History of blood transfusion; Sickle cell anemia ([facility] [address] Sickle cell disease, type SC ([facility] [address] and Unspecified diseases of blood and blood-forming organs. Social History:   reports that he has been smoking Cigarettes. He has a 2.00 pack-year smoking history. He does not have any smokeless tobacco history on file. He reports that he uses drugs about 1 time per week. He reports that he does not drink alcohol. Family History: History reviewed. No pertinent family history. Medications: Allergies:  No Known Allergies    Current Meds:   Scheduled Meds:   diclofenac sodium  2 g Topical BID    sodium chloride flush  10 mL Intravenous 2 times per day    docusate sodium  100 mg Oral BID    enoxaparin  40 mg Subcutaneous Daily    azithromycin  500 mg Intravenous Q24H    And    cefTRIAXone (ROCEPHIN) IV  1 g Intravenous Q24H    dextromethorphan-guaiFENesin  10 mL Oral TID     Continuous Infusions:    sodium chloride 125 mL/hr at 10/25/18 2046     PRN Meds: diphenhydrAMINE, oxyCODONE-acetaminophen, oxyCODONE-acetaminophen, nicotine polacrilex, sodium chloride flush, magnesium hydroxide, bisacodyl, ondansetron, nicotine, albuterol, acetaminophen, fentanNYL      Review of Systems:     Review of Systems   Constitutional: Negative for chills and diaphoresis. Respiratory: Negative for cough, shortness of breath and wheezing. Cardiovascular: Positive for chest pain (right thoracic pain). Negative for leg swelling. Gastrointestinal: Negative for abdominal distention, nausea and rectal pain. Genitourinary: Negative for flank pain and hematuria. Musculoskeletal: Positive for arthralgias and myalgias. Patient reports generalized pain and stiffness   Psychiatric/Behavioral: The patient is nervous/anxious (looks uncomfortable).

## 2018-10-27 ENCOUNTER — APPOINTMENT (OUTPATIENT)
Dept: GENERAL RADIOLOGY | Age: 23
DRG: 139 | End: 2018-10-27
Payer: MEDICAID

## 2018-10-27 VITALS
TEMPERATURE: 97.7 F | HEART RATE: 71 BPM | SYSTOLIC BLOOD PRESSURE: 128 MMHG | RESPIRATION RATE: 20 BRPM | BODY MASS INDEX: 25.87 KG/M2 | WEIGHT: 191 LBS | OXYGEN SATURATION: 98 % | HEIGHT: 72 IN | DIASTOLIC BLOOD PRESSURE: 82 MMHG

## 2018-10-27 PROBLEM — N52.9 VASCULOGENIC ERECTILE DYSFUNCTION: Status: ACTIVE | Noted: 2018-10-27

## 2018-10-27 LAB
ABSOLUTE RETIC #: 0.22 M/UL (ref 0.03–0.08)
ANION GAP SERPL CALCULATED.3IONS-SCNC: 11 MMOL/L (ref 9–17)
BUN BLDV-MCNC: 4 MG/DL (ref 6–20)
BUN/CREAT BLD: ABNORMAL (ref 9–20)
CALCIUM SERPL-MCNC: 8.8 MG/DL (ref 8.6–10.4)
CHLORIDE BLD-SCNC: 103 MMOL/L (ref 98–107)
CO2: 25 MMOL/L (ref 20–31)
CREAT SERPL-MCNC: 0.49 MG/DL (ref 0.7–1.2)
GFR AFRICAN AMERICAN: >60 ML/MIN
GFR NON-AFRICAN AMERICAN: >60 ML/MIN
GFR SERPL CREATININE-BSD FRML MDRD: ABNORMAL ML/MIN/{1.73_M2}
GFR SERPL CREATININE-BSD FRML MDRD: ABNORMAL ML/MIN/{1.73_M2}
GLUCOSE BLD-MCNC: 80 MG/DL (ref 70–99)
HCT VFR BLD CALC: 32.4 % (ref 40.7–50.3)
HEMOGLOBIN: 11.7 G/DL (ref 13–17)
IMMATURE RETIC FRACT: 15.6 % (ref 2.7–18.3)
LACTATE DEHYDROGENASE: 391 U/L (ref 135–225)
MCH RBC QN AUTO: 30.7 PG (ref 25.2–33.5)
MCHC RBC AUTO-ENTMCNC: 36.1 G/DL (ref 28.4–34.8)
MCV RBC AUTO: 85 FL (ref 82.6–102.9)
NRBC AUTOMATED: 0.4 PER 100 WBC
PDW BLD-RTO: 15.2 % (ref 11.8–14.4)
PLATELET # BLD: 205 K/UL (ref 138–453)
PMV BLD AUTO: 12 FL (ref 8.1–13.5)
POTASSIUM SERPL-SCNC: 4.1 MMOL/L (ref 3.7–5.3)
RBC # BLD: 3.81 M/UL (ref 4.21–5.77)
RETIC %: 5.7 % (ref 0.5–1.9)
RETIC HEMOGLOBIN: 29.1 PG (ref 28.2–35.7)
SODIUM BLD-SCNC: 139 MMOL/L (ref 135–144)
WBC # BLD: 11.3 K/UL (ref 3.5–11.3)

## 2018-10-27 PROCEDURE — 6370000000 HC RX 637 (ALT 250 FOR IP): Performed by: NURSE PRACTITIONER

## 2018-10-27 PROCEDURE — 6360000002 HC RX W HCPCS: Performed by: INTERNAL MEDICINE

## 2018-10-27 PROCEDURE — 80048 BASIC METABOLIC PNL TOTAL CA: CPT

## 2018-10-27 PROCEDURE — 99232 SBSQ HOSP IP/OBS MODERATE 35: CPT | Performed by: INTERNAL MEDICINE

## 2018-10-27 PROCEDURE — 36415 COLL VENOUS BLD VENIPUNCTURE: CPT

## 2018-10-27 PROCEDURE — 6360000002 HC RX W HCPCS: Performed by: NURSE PRACTITIONER

## 2018-10-27 PROCEDURE — 71046 X-RAY EXAM CHEST 2 VIEWS: CPT

## 2018-10-27 PROCEDURE — 2580000003 HC RX 258: Performed by: INTERNAL MEDICINE

## 2018-10-27 PROCEDURE — 6370000000 HC RX 637 (ALT 250 FOR IP): Performed by: INTERNAL MEDICINE

## 2018-10-27 PROCEDURE — 2580000003 HC RX 258: Performed by: NURSE PRACTITIONER

## 2018-10-27 PROCEDURE — 83615 LACTATE (LD) (LDH) ENZYME: CPT

## 2018-10-27 PROCEDURE — 85045 AUTOMATED RETICULOCYTE COUNT: CPT

## 2018-10-27 PROCEDURE — 85027 COMPLETE CBC AUTOMATED: CPT

## 2018-10-27 RX ORDER — CEFUROXIME AXETIL 500 MG/1
500 TABLET ORAL 2 TIMES DAILY
Qty: 10 TABLET | Refills: 0 | Status: ON HOLD | OUTPATIENT
Start: 2018-10-27 | End: 2018-11-03 | Stop reason: HOSPADM

## 2018-10-27 RX ORDER — POLYETHYLENE GLYCOL 3350 17 G/17G
17 POWDER, FOR SOLUTION ORAL DAILY PRN
Status: DISCONTINUED | OUTPATIENT
Start: 2018-10-27 | End: 2018-10-27 | Stop reason: HOSPADM

## 2018-10-27 RX ORDER — AZITHROMYCIN 250 MG/1
TABLET, FILM COATED ORAL
Qty: 3 TABLET | Refills: 0 | Status: ON HOLD | OUTPATIENT
Start: 2018-10-27 | End: 2018-11-03 | Stop reason: HOSPADM

## 2018-10-27 RX ORDER — SENNA PLUS 8.6 MG/1
1 TABLET ORAL NIGHTLY PRN
Status: DISCONTINUED | OUTPATIENT
Start: 2018-10-27 | End: 2018-10-27 | Stop reason: HOSPADM

## 2018-10-27 RX ORDER — GUAIFENESIN DEXTROMETHORPHAN HYDROBROMIDE ORAL SOLUTION 10; 100 MG/5ML; MG/5ML
10 SOLUTION ORAL 3 TIMES DAILY
Qty: 1 BOTTLE | Refills: 1 | Status: ON HOLD | OUTPATIENT
Start: 2018-10-27 | End: 2018-11-03 | Stop reason: HOSPADM

## 2018-10-27 RX ADMIN — FENTANYL CITRATE 25 MCG: 50 INJECTION INTRAMUSCULAR; INTRAVENOUS at 16:06

## 2018-10-27 RX ADMIN — DOCUSATE SODIUM 100 MG: 100 CAPSULE, LIQUID FILLED ORAL at 09:11

## 2018-10-27 RX ADMIN — OXYCODONE HYDROCHLORIDE AND ACETAMINOPHEN 2 TABLET: 5; 325 TABLET ORAL at 14:38

## 2018-10-27 RX ADMIN — SODIUM CHLORIDE: 9 INJECTION, SOLUTION INTRAVENOUS at 06:14

## 2018-10-27 RX ADMIN — CEFTRIAXONE SODIUM 1 G: 1 INJECTION, POWDER, FOR SOLUTION INTRAMUSCULAR; INTRAVENOUS at 03:32

## 2018-10-27 RX ADMIN — Medication 10 ML: at 09:11

## 2018-10-27 RX ADMIN — OXYCODONE HYDROCHLORIDE AND ACETAMINOPHEN 2 TABLET: 5; 325 TABLET ORAL at 05:31

## 2018-10-27 RX ADMIN — Medication 10 ML: at 15:11

## 2018-10-27 RX ADMIN — SODIUM CHLORIDE: 9 INJECTION, SOLUTION INTRAVENOUS at 15:11

## 2018-10-27 RX ADMIN — OXYCODONE HYDROCHLORIDE AND ACETAMINOPHEN 2 TABLET: 5; 325 TABLET ORAL at 01:53

## 2018-10-27 RX ADMIN — Medication 10 ML: at 09:13

## 2018-10-27 RX ADMIN — OXYCODONE HYDROCHLORIDE AND ACETAMINOPHEN 2 TABLET: 5; 325 TABLET ORAL at 09:11

## 2018-10-27 RX ADMIN — DICLOFENAC 2 G: 10 GEL TOPICAL at 09:11

## 2018-10-27 RX ADMIN — ENOXAPARIN SODIUM 40 MG: 40 INJECTION SUBCUTANEOUS at 09:11

## 2018-10-27 RX ADMIN — FENTANYL CITRATE 25 MCG: 50 INJECTION INTRAMUSCULAR; INTRAVENOUS at 03:32

## 2018-10-27 RX ADMIN — FENTANYL CITRATE 25 MCG: 50 INJECTION INTRAMUSCULAR; INTRAVENOUS at 11:30

## 2018-10-27 RX ADMIN — FENTANYL CITRATE 25 MCG: 50 INJECTION INTRAMUSCULAR; INTRAVENOUS at 07:38

## 2018-10-27 RX ADMIN — AZITHROMYCIN MONOHYDRATE 500 MG: 500 INJECTION, POWDER, LYOPHILIZED, FOR SOLUTION INTRAVENOUS at 04:08

## 2018-10-27 ASSESSMENT — PAIN SCALES - GENERAL
PAINLEVEL_OUTOF10: 8
PAINLEVEL_OUTOF10: 7
PAINLEVEL_OUTOF10: 6
PAINLEVEL_OUTOF10: 8
PAINLEVEL_OUTOF10: 8
PAINLEVEL_OUTOF10: 10
PAINLEVEL_OUTOF10: 8
PAINLEVEL_OUTOF10: 8
PAINLEVEL_OUTOF10: 6
PAINLEVEL_OUTOF10: 7
PAINLEVEL_OUTOF10: 3

## 2018-10-27 ASSESSMENT — ENCOUNTER SYMPTOMS
WHEEZING: 0
ABDOMINAL DISTENTION: 0
RECTAL PAIN: 0
SHORTNESS OF BREATH: 0
COUGH: 0
NAUSEA: 0

## 2018-10-27 ASSESSMENT — PAIN DESCRIPTION - DESCRIPTORS: DESCRIPTORS: ACHING;CONSTANT;DISCOMFORT

## 2018-10-27 ASSESSMENT — PAIN DESCRIPTION - FREQUENCY: FREQUENCY: CONTINUOUS

## 2018-10-27 ASSESSMENT — PAIN DESCRIPTION - PAIN TYPE: TYPE: ACUTE PAIN

## 2018-10-27 ASSESSMENT — PAIN DESCRIPTION - ONSET: ONSET: ON-GOING

## 2018-10-27 ASSESSMENT — PAIN DESCRIPTION - LOCATION: LOCATION: GENERALIZED;SHOULDER

## 2018-10-27 ASSESSMENT — PAIN DESCRIPTION - PROGRESSION: CLINICAL_PROGRESSION: GRADUALLY WORSENING

## 2018-10-27 ASSESSMENT — PAIN DESCRIPTION - ORIENTATION: ORIENTATION: RIGHT

## 2018-10-27 NOTE — PROGRESS NOTES
(36.8 °C), Min:97.7 °F (36.5 °C), Max:98.6 °F (37 °C)    No results for input(s): POCGLU in the last 72 hours. Physical Exam   Constitutional: He is oriented to person, place, and time. No distress. HENT:   Head: Normocephalic. Nose: Nose normal.   Eyes: Conjunctivae are normal. No scleral icterus. Neck: Neck supple. No tracheal deviation present. No thyromegaly present. Cardiovascular: Normal rate, regular rhythm and normal heart sounds. Pulmonary/Chest: Effort normal and breath sounds normal. He has no wheezes. He has no rales. He exhibits tenderness (right rib cage). Abdominal: Soft. Bowel sounds are normal. He exhibits no distension. There is no tenderness. Musculoskeletal: He exhibits no edema or tenderness. Lymphadenopathy:     He has no cervical adenopathy. Neurological: He is alert and oriented to person, place, and time. Skin: Skin is warm and dry. He is not diaphoretic. Psychiatric: He has a normal mood and affect. Judgment normal.   Vitals reviewed. Data:     I/O (24Hr):     Intake/Output Summary (Last 24 hours) at 10/27/18 1622  Last data filed at 10/26/18 1840   Gross per 24 hour   Intake              807 ml   Output                0 ml   Net              807 ml       Labs:    Hematology:  Recent Labs      10/25/18   0602  10/27/18   0559   WBC  15.2*  11.3   HGB  12.9*  11.7*   HCT  35.4*  32.4*   PLT  184  205     Chemistry:  Recent Labs      10/25/18   0602  10/27/18   0559   NA  136  139   K  4.0  4.1   CL  99  103   CO2  26  25   GLUCOSE  90  80   BUN  4*  4*   CREATININE  0.62*  0.49*   CALCIUM  8.7  8.8     Recent Labs      10/25/18   1554  10/27/18   0559   LDH  366*  391*       Lab Results   Component Value Date/Time    SPECIAL NOT REPORTED 10/24/2018 11:20 AM     Lab Results   Component Value Date/Time    CULTURE NORMAL RESPIRATORY LISA HEAVY GROWTH 10/24/2018 11:20 AM       No results found for: POCPH, PHART, PH, POCPCO2, RBH6YAE, PCO2, POCPO2, PO2ART, PO2,

## 2018-10-27 NOTE — PLAN OF CARE
[address] [place] [address]       Second Visit Note  For more detailed information please refer to the progress note of the day      10/27/2018    6:25 PM    Name:   Marjan Bonds  MRN:     [id number]     Prema:      [de-identified]   Room:   [address] [place] [address] Day:  3  Admit Date:  10/23/2018 10:21 PM    PCP:   No primary care provider on file. Code Status:  Full Code    Patient was seen  Patient continues to improve  He has been seen by  - cleared for discharge     The patient's feeling better  Mild cough  Minimal sputum    Chest x-ray:  Impression:        Increased right lower lobe opacity may represent developing pneumonia.        CURRENT MEDS:     senna (SENOKOT) tablet 8.6 mg Nightly PRN   polyethylene glycol (GLYCOLAX) packet 17 g Daily PRN   diphenhydrAMINE (BENADRYL) injection 25 mg Q6H PRN   oxyCODONE-acetaminophen (PERCOCET) 5-325 MG per tablet 1 tablet Q4H PRN   oxyCODONE-acetaminophen (PERCOCET) 5-325 MG per tablet 2 tablet Q4H PRN   diclofenac sodium 1 % gel 2 g BID   nicotine polacrilex (NICORETTE) gum 2 mg PRN   0.9 % sodium chloride infusion Continuous   sodium chloride flush 0.9 % injection 10 mL 2 times per day   sodium chloride flush 0.9 % injection 10 mL PRN   magnesium hydroxide (MILK OF MAGNESIA) 400 MG/5ML suspension 30 mL Daily PRN   docusate sodium (COLACE) capsule 100 mg BID   bisacodyl (DULCOLAX) suppository 10 mg Daily PRN   ondansetron (ZOFRAN) injection 4 mg Q6H PRN   nicotine (NICODERM CQ) 21 MG/24HR 1 patch Daily PRN   enoxaparin (LOVENOX) injection 40 mg Daily   albuterol (PROVENTIL) nebulizer solution 2.5 mg Q2H PRN   acetaminophen (TYLENOL) tablet 650 mg Q4H PRN   azithromycin (ZITHROMAX) 500 mg in dextrose 5 % 250 mL IVPB Q24H   And    cefTRIAXone (ROCEPHIN) 1 g IVPB in 50 mL D5W minibag Q24H   dextromethorphan-guaiFENesin (ROBITUSSIN-DM)  MG/5ML liquid 10 mL TID   fentaNYL (SUBLIMAZE) injection 25 mcg Q3H PRN       VITALS:  /82   Pulse 71

## 2018-10-28 NOTE — PROGRESS NOTES
mg Subcutaneous Daily Myriam RODDY Gutierres - CNP   40 mg at 10/27/18 0911    albuterol (PROVENTIL) nebulizer solution 2.5 mg  2.5 mg Nebulization Q2H PRN Myriamsinai Cruz APRN - CNP        acetaminophen (TYLENOL) tablet 650 mg  650 mg Oral Q4H PRN Myriam MARILYNN Cruz APRN - CNP   650 mg at 10/24/18 0439    azithromycin (ZITHROMAX) 500 mg in dextrose 5 % 250 mL IVPB  500 mg Intravenous Q24H Myriam MARILYNN Cruz APRN - CNP   Stopped at 10/27/18 0508    And    cefTRIAXone (ROCEPHIN) 1 g IVPB in 50 mL D5W minibag  1 g Intravenous Q24H Myriam MARILYNN Cruz APRN - CNP   Stopped at 10/27/18 0402    dextromethorphan-guaiFENesin (ROBITUSSIN-DM)  MG/5ML liquid 10 mL  10 mL Oral TID Francie Finn MD   10 mL at 10/27/18 1511    fentaNYL (SUBLIMAZE) injection 25 mcg  25 mcg Intravenous Q3H PRN Francie Finn MD   25 mcg at 10/27/18 1606     Current Outpatient Prescriptions   Medication Sig Dispense Refill    dextromethorphan-guaiFENesin (ROBITUSSIN-DM)  MG/5ML syrup Take 10 mLs by mouth 3 times daily 1 Bottle 1    azithromycin (ZITHROMAX) 250 MG tablet Take 1 tab daily. 3 tablet 0    cefUROXime (CEFTIN) 500 MG tablet Take 1 tablet by mouth 2 times daily for 10 days 10 tablet 0    nicotine polacrilex (NICORETTE) 2 MG gum Take 1 each by mouth as needed for Smoking cessation 110 each 3       Allergies:  Patient has no known allergies. Social History:   reports that he has been smoking Cigarettes. He has a 2.00 pack-year smoking history. He does not have any smokeless tobacco history on file. He reports that he uses drugs about 1 time per week. He reports that he does not drink alcohol. Family History: family history is not on file. Family history was reviewed and no pertinent family history noted. REVIEW OF SYSTEMS:    Constitutional: No fever or chills.  No night sweats, no weight loss   Eyes: No eye discharge, double vision, or eye pain   HEENT: negative for sore mouth, sore throat,

## 2018-10-28 NOTE — DISCHARGE SUMMARY
Disposition: Home    Physician Follow Up:   [facility]/ [address]  [address] [facility] [address] [facility]  [phone]  Go on 10/31/2018  Follow up PCP visit @ 2:30 pm - Please bring photo ID, insurance card, and medication list. Please call to reschedule if unable to make this appointment. [address], [address] [address]  [phone]    Call  As needed for sickle cell crisis, If symptoms worsen       Diet:   As tolerated    Activity:   As tolerated    Discharge Medications:      Medication List      START taking these medications    azithromycin 250 MG tablet  Commonly known as:  ZITHROMAX  Take 1 tab daily. cefUROXime 500 MG tablet  Commonly known as:  CEFTIN  Take 1 tablet by mouth 2 times daily for 10 days     dextromethorphan-guaiFENesin  MG/5ML syrup  Commonly known as:  ROBITUSSIN-DM  Take 10 mLs by mouth 3 times daily        CONTINUE taking these medications    nicotine polacrilex 2 MG gum  Commonly known as:  NICORETTE  Take 1 each by mouth as needed for Smoking cessation        STOP taking these medications    ibuprofen 800 MG tablet  Commonly known as:  ADVIL;MOTRIN           Where to Get Your Medications      These medications were sent to [facility] [address] [place], [place] [address]vd.  [address], [place] [address]    Phone:  [phone]   · azithromycin 250 MG tablet  · cefUROXime 500 MG tablet  · dextromethorphan-guaiFENesin  MG/5ML syrup         Time Spent on discharge is  20 mins in patient examination, evaluation, counseling, medication reconciliation, discharge plan and follow up. Electronically signed by   Lopez Corcoran DO  10/27/2018  10:01 PM      Thank you Dr. Annabelle Del Valle primary care provider on file. for the opportunity to be involved in this patient's care.

## 2018-10-29 ENCOUNTER — TELEPHONE (OUTPATIENT)
Dept: ONCOLOGY | Age: 23
End: 2018-10-29

## 2018-10-29 DIAGNOSIS — D57.211: Primary | ICD-10-CM

## 2018-10-29 NOTE — TELEPHONE ENCOUNTER
Received call from [facility] outpatient pharmacy reporting that [address] brought a hand written script for percocet from Dr Parrish Bowman for every 6 hours prn. Insurance will only give them tid prn so only 90 tablets given to patient.

## 2018-10-30 ENCOUNTER — HOSPITAL ENCOUNTER (INPATIENT)
Age: 23
LOS: 3 days | Discharge: HOME | DRG: 139 | End: 2018-11-03
Attending: EMERGENCY MEDICINE | Admitting: FAMILY MEDICINE
Payer: MEDICAID

## 2018-10-30 DIAGNOSIS — D72.829 LEUKOCYTOSIS, UNSPECIFIED TYPE: ICD-10-CM

## 2018-10-30 DIAGNOSIS — R06.02 SHORTNESS OF BREATH: Primary | ICD-10-CM

## 2018-10-30 DIAGNOSIS — R07.9 CHEST PAIN, UNSPECIFIED TYPE: ICD-10-CM

## 2018-10-30 DIAGNOSIS — J18.9 PNEUMONIA DUE TO ORGANISM: ICD-10-CM

## 2018-10-30 LAB
CULTURE: NORMAL
CULTURE: NORMAL
Lab: NORMAL
Lab: NORMAL
SPECIMEN DESCRIPTION: NORMAL
SPECIMEN DESCRIPTION: NORMAL
STATUS: NORMAL
STATUS: NORMAL

## 2018-10-30 PROCEDURE — 99285 EMERGENCY DEPT VISIT HI MDM: CPT

## 2018-10-30 RX ORDER — 0.9 % SODIUM CHLORIDE 0.9 %
1000 INTRAVENOUS SOLUTION INTRAVENOUS ONCE
Status: COMPLETED | OUTPATIENT
Start: 2018-10-31 | End: 2018-10-31

## 2018-10-30 RX ORDER — MORPHINE SULFATE 4 MG/ML
4 INJECTION, SOLUTION INTRAMUSCULAR; INTRAVENOUS ONCE
Status: COMPLETED | OUTPATIENT
Start: 2018-10-31 | End: 2018-10-31

## 2018-10-30 ASSESSMENT — PAIN DESCRIPTION - ORIENTATION: ORIENTATION: RIGHT

## 2018-10-31 ENCOUNTER — APPOINTMENT (OUTPATIENT)
Dept: GENERAL RADIOLOGY | Age: 23
DRG: 139 | End: 2018-10-31
Payer: MEDICAID

## 2018-10-31 PROBLEM — J18.9 PNEUMONIA: Status: ACTIVE | Noted: 2018-10-31

## 2018-10-31 LAB
ABSOLUTE EOS #: 0 K/UL (ref 0–0.4)
ABSOLUTE IMMATURE GRANULOCYTE: 0 K/UL (ref 0–0.3)
ABSOLUTE LYMPH #: 2.37 K/UL (ref 1–4.8)
ABSOLUTE MONO #: 2.05 K/UL (ref 0.1–0.8)
ABSOLUTE RETIC #: 0.18 M/UL (ref 0.03–0.08)
ALBUMIN SERPL-MCNC: 3.8 G/DL (ref 3.5–5.2)
ALBUMIN/GLOBULIN RATIO: 1.1 (ref 1–2.5)
ALP BLD-CCNC: 85 U/L (ref 40–129)
ALT SERPL-CCNC: 35 U/L (ref 5–41)
ANION GAP SERPL CALCULATED.3IONS-SCNC: 13 MMOL/L (ref 9–17)
AST SERPL-CCNC: 64 U/L
BASOPHILS # BLD: 0 % (ref 0–2)
BASOPHILS ABSOLUTE: 0 K/UL (ref 0–0.2)
BILIRUB SERPL-MCNC: 3.92 MG/DL (ref 0.3–1.2)
BUN BLDV-MCNC: 8 MG/DL (ref 6–20)
BUN/CREAT BLD: ABNORMAL (ref 9–20)
CALCIUM SERPL-MCNC: 8.8 MG/DL (ref 8.6–10.4)
CHLORIDE BLD-SCNC: 98 MMOL/L (ref 98–107)
CO2: 24 MMOL/L (ref 20–31)
CREAT SERPL-MCNC: 0.64 MG/DL (ref 0.7–1.2)
DIFFERENTIAL TYPE: ABNORMAL
EKG ATRIAL RATE: 84 BPM
EKG P AXIS: 53 DEGREES
EKG P-R INTERVAL: 152 MS
EKG Q-T INTERVAL: 372 MS
EKG QRS DURATION: 82 MS
EKG QTC CALCULATION (BAZETT): 439 MS
EKG R AXIS: 31 DEGREES
EKG T AXIS: 40 DEGREES
EKG VENTRICULAR RATE: 84 BPM
EOSINOPHILS RELATIVE PERCENT: 0 % (ref 1–4)
GFR AFRICAN AMERICAN: >60 ML/MIN
GFR NON-AFRICAN AMERICAN: >60 ML/MIN
GFR SERPL CREATININE-BSD FRML MDRD: ABNORMAL ML/MIN/{1.73_M2}
GFR SERPL CREATININE-BSD FRML MDRD: ABNORMAL ML/MIN/{1.73_M2}
GLUCOSE BLD-MCNC: 110 MG/DL (ref 70–99)
HCT VFR BLD CALC: 28.8 % (ref 40.7–50.3)
HEMOGLOBIN: 10.6 G/DL (ref 13–17)
IMMATURE GRANULOCYTES: 0 %
IMMATURE RETIC FRACT: 19.9 % (ref 2.7–18.3)
INR BLD: 1
LACTATE DEHYDROGENASE: 428 U/L (ref 135–225)
LYMPHOCYTES # BLD: 15 % (ref 24–44)
MCH RBC QN AUTO: 30.3 PG (ref 25.2–33.5)
MCHC RBC AUTO-ENTMCNC: 36.8 G/DL (ref 28.4–34.8)
MCV RBC AUTO: 82.3 FL (ref 82.6–102.9)
MONOCYTES # BLD: 13 % (ref 1–7)
MORPHOLOGY: ABNORMAL
NRBC AUTOMATED: 0.4 PER 100 WBC
PARTIAL THROMBOPLASTIN TIME: 26.9 SEC (ref 20.5–30.5)
PDW BLD-RTO: 15.4 % (ref 11.8–14.4)
PLATELET # BLD: 251 K/UL (ref 138–453)
PLATELET ESTIMATE: ABNORMAL
PMV BLD AUTO: 11.7 FL (ref 8.1–13.5)
POTASSIUM SERPL-SCNC: 4.4 MMOL/L (ref 3.7–5.3)
PROTHROMBIN TIME: 10.5 SEC (ref 9–12)
RBC # BLD: 3.5 M/UL (ref 4.21–5.77)
RBC # BLD: ABNORMAL 10*6/UL
RETIC %: 5.5 % (ref 0.5–1.9)
RETIC HEMOGLOBIN: 28.4 PG (ref 28.2–35.7)
SEG NEUTROPHILS: 72 % (ref 36–66)
SEGMENTED NEUTROPHILS ABSOLUTE COUNT: 11.38 K/UL (ref 1.8–7.7)
SODIUM BLD-SCNC: 135 MMOL/L (ref 135–144)
TOTAL PROTEIN: 7.2 G/DL (ref 6.4–8.3)
TROPONIN INTERP: NORMAL
TROPONIN T: <0.03 NG/ML
WBC # BLD: 15.8 K/UL (ref 3.5–11.3)
WBC # BLD: ABNORMAL 10*3/UL

## 2018-10-31 PROCEDURE — 6360000002 HC RX W HCPCS: Performed by: EMERGENCY MEDICINE

## 2018-10-31 PROCEDURE — 94762 N-INVAS EAR/PLS OXIMTRY CONT: CPT

## 2018-10-31 PROCEDURE — 84484 ASSAY OF TROPONIN QUANT: CPT

## 2018-10-31 PROCEDURE — 85025 COMPLETE CBC W/AUTO DIFF WBC: CPT

## 2018-10-31 PROCEDURE — 99254 IP/OBS CNSLTJ NEW/EST MOD 60: CPT | Performed by: INTERNAL MEDICINE

## 2018-10-31 PROCEDURE — 85045 AUTOMATED RETICULOCYTE COUNT: CPT

## 2018-10-31 PROCEDURE — 85730 THROMBOPLASTIN TIME PARTIAL: CPT

## 2018-10-31 PROCEDURE — 6360000002 HC RX W HCPCS: Performed by: NURSE PRACTITIONER

## 2018-10-31 PROCEDURE — 2700000000 HC OXYGEN THERAPY PER DAY

## 2018-10-31 PROCEDURE — 71046 X-RAY EXAM CHEST 2 VIEWS: CPT

## 2018-10-31 PROCEDURE — 85610 PROTHROMBIN TIME: CPT

## 2018-10-31 PROCEDURE — 83615 LACTATE (LD) (LDH) ENZYME: CPT

## 2018-10-31 PROCEDURE — 93005 ELECTROCARDIOGRAM TRACING: CPT

## 2018-10-31 PROCEDURE — 94640 AIRWAY INHALATION TREATMENT: CPT

## 2018-10-31 PROCEDURE — 80053 COMPREHEN METABOLIC PANEL: CPT

## 2018-10-31 PROCEDURE — 2580000003 HC RX 258: Performed by: EMERGENCY MEDICINE

## 2018-10-31 PROCEDURE — 6370000000 HC RX 637 (ALT 250 FOR IP): Performed by: EMERGENCY MEDICINE

## 2018-10-31 PROCEDURE — 2580000003 HC RX 258: Performed by: NURSE PRACTITIONER

## 2018-10-31 PROCEDURE — 96365 THER/PROPH/DIAG IV INF INIT: CPT

## 2018-10-31 PROCEDURE — 99222 1ST HOSP IP/OBS MODERATE 55: CPT | Performed by: FAMILY MEDICINE

## 2018-10-31 PROCEDURE — 6370000000 HC RX 637 (ALT 250 FOR IP): Performed by: NURSE PRACTITIONER

## 2018-10-31 PROCEDURE — 96375 TX/PRO/DX INJ NEW DRUG ADDON: CPT

## 2018-10-31 PROCEDURE — 87040 BLOOD CULTURE FOR BACTERIA: CPT

## 2018-10-31 PROCEDURE — 2060000000 HC ICU INTERMEDIATE R&B

## 2018-10-31 RX ORDER — SODIUM CHLORIDE 0.9 % (FLUSH) 0.9 %
10 SYRINGE (ML) INJECTION EVERY 12 HOURS SCHEDULED
Status: DISCONTINUED | OUTPATIENT
Start: 2018-10-31 | End: 2018-11-03 | Stop reason: HOSPADM

## 2018-10-31 RX ORDER — ONDANSETRON 2 MG/ML
4 INJECTION INTRAMUSCULAR; INTRAVENOUS EVERY 6 HOURS PRN
Status: DISCONTINUED | OUTPATIENT
Start: 2018-10-31 | End: 2018-11-03 | Stop reason: HOSPADM

## 2018-10-31 RX ORDER — ACETAMINOPHEN 325 MG/1
650 TABLET ORAL EVERY 4 HOURS PRN
Status: DISCONTINUED | OUTPATIENT
Start: 2018-10-31 | End: 2018-11-03 | Stop reason: HOSPADM

## 2018-10-31 RX ORDER — NICOTINE 21 MG/24HR
1 PATCH, TRANSDERMAL 24 HOURS TRANSDERMAL DAILY PRN
Status: DISCONTINUED | OUTPATIENT
Start: 2018-10-31 | End: 2018-11-03 | Stop reason: HOSPADM

## 2018-10-31 RX ORDER — SODIUM CHLORIDE 0.9 % (FLUSH) 0.9 %
10 SYRINGE (ML) INJECTION PRN
Status: DISCONTINUED | OUTPATIENT
Start: 2018-10-31 | End: 2018-11-03 | Stop reason: HOSPADM

## 2018-10-31 RX ORDER — SODIUM CHLORIDE, SODIUM LACTATE, POTASSIUM CHLORIDE, AND CALCIUM CHLORIDE .6; .31; .03; .02 G/100ML; G/100ML; G/100ML; G/100ML
1000 INJECTION, SOLUTION INTRAVENOUS ONCE
Status: DISCONTINUED | OUTPATIENT
Start: 2018-10-31 | End: 2018-10-31

## 2018-10-31 RX ORDER — AZITHROMYCIN 250 MG/1
500 TABLET, FILM COATED ORAL ONCE
Status: COMPLETED | OUTPATIENT
Start: 2018-10-31 | End: 2018-10-31

## 2018-10-31 RX ORDER — IPRATROPIUM BROMIDE AND ALBUTEROL SULFATE 2.5; .5 MG/3ML; MG/3ML
1 SOLUTION RESPIRATORY (INHALATION)
Status: DISCONTINUED | OUTPATIENT
Start: 2018-10-31 | End: 2018-11-03 | Stop reason: HOSPADM

## 2018-10-31 RX ORDER — BISACODYL 10 MG
10 SUPPOSITORY, RECTAL RECTAL DAILY PRN
Status: DISCONTINUED | OUTPATIENT
Start: 2018-10-31 | End: 2018-11-03 | Stop reason: HOSPADM

## 2018-10-31 RX ORDER — SODIUM CHLORIDE 9 MG/ML
INJECTION, SOLUTION INTRAVENOUS CONTINUOUS
Status: DISCONTINUED | OUTPATIENT
Start: 2018-10-31 | End: 2018-11-02

## 2018-10-31 RX ORDER — DOCUSATE SODIUM 100 MG/1
100 CAPSULE, LIQUID FILLED ORAL 2 TIMES DAILY
Status: DISCONTINUED | OUTPATIENT
Start: 2018-10-31 | End: 2018-11-03 | Stop reason: HOSPADM

## 2018-10-31 RX ORDER — ALBUTEROL SULFATE 2.5 MG/3ML
2.5 SOLUTION RESPIRATORY (INHALATION)
Status: DISCONTINUED | OUTPATIENT
Start: 2018-10-31 | End: 2018-11-03 | Stop reason: HOSPADM

## 2018-10-31 RX ADMIN — IPRATROPIUM BROMIDE AND ALBUTEROL SULFATE 1 AMPULE: .5; 3 SOLUTION RESPIRATORY (INHALATION) at 11:52

## 2018-10-31 RX ADMIN — MORPHINE SULFATE 4 MG: 4 INJECTION INTRAVENOUS at 00:10

## 2018-10-31 RX ADMIN — AZITHROMYCIN MONOHYDRATE 500 MG: 500 INJECTION, POWDER, LYOPHILIZED, FOR SOLUTION INTRAVENOUS at 21:58

## 2018-10-31 RX ADMIN — IPRATROPIUM BROMIDE AND ALBUTEROL SULFATE 1 AMPULE: .5; 3 SOLUTION RESPIRATORY (INHALATION) at 20:39

## 2018-10-31 RX ADMIN — MAGNESIUM HYDROXIDE 30 ML: 400 SUSPENSION ORAL at 14:53

## 2018-10-31 RX ADMIN — AZITHROMYCIN 500 MG: 250 TABLET, FILM COATED ORAL at 01:12

## 2018-10-31 RX ADMIN — HYDROMORPHONE HYDROCHLORIDE 0.5 MG: 1 INJECTION, SOLUTION INTRAMUSCULAR; INTRAVENOUS; SUBCUTANEOUS at 05:34

## 2018-10-31 RX ADMIN — ALBUTEROL SULFATE 2.5 MG: 2.5 SOLUTION RESPIRATORY (INHALATION) at 02:12

## 2018-10-31 RX ADMIN — HYDROMORPHONE HYDROCHLORIDE 0.5 MG: 1 INJECTION, SOLUTION INTRAMUSCULAR; INTRAVENOUS; SUBCUTANEOUS at 11:36

## 2018-10-31 RX ADMIN — DOCUSATE SODIUM 100 MG: 100 CAPSULE, LIQUID FILLED ORAL at 08:14

## 2018-10-31 RX ADMIN — ONDANSETRON 4 MG: 2 INJECTION INTRAMUSCULAR; INTRAVENOUS at 18:41

## 2018-10-31 RX ADMIN — IPRATROPIUM BROMIDE AND ALBUTEROL SULFATE 1 AMPULE: .5; 3 SOLUTION RESPIRATORY (INHALATION) at 08:05

## 2018-10-31 RX ADMIN — HYDROMORPHONE HYDROCHLORIDE 0.5 MG: 1 INJECTION, SOLUTION INTRAMUSCULAR; INTRAVENOUS; SUBCUTANEOUS at 02:34

## 2018-10-31 RX ADMIN — DOCUSATE SODIUM 100 MG: 100 CAPSULE, LIQUID FILLED ORAL at 20:47

## 2018-10-31 RX ADMIN — HYDROMORPHONE HYDROCHLORIDE 0.5 MG: 1 INJECTION, SOLUTION INTRAMUSCULAR; INTRAVENOUS; SUBCUTANEOUS at 17:54

## 2018-10-31 RX ADMIN — HYDROMORPHONE HYDROCHLORIDE 0.5 MG: 1 INJECTION, SOLUTION INTRAMUSCULAR; INTRAVENOUS; SUBCUTANEOUS at 08:38

## 2018-10-31 RX ADMIN — CEFTRIAXONE SODIUM 1 G: 1 INJECTION, POWDER, FOR SOLUTION INTRAMUSCULAR; INTRAVENOUS at 01:12

## 2018-10-31 RX ADMIN — DOCUSATE SODIUM 100 MG: 100 CAPSULE, LIQUID FILLED ORAL at 02:34

## 2018-10-31 RX ADMIN — ENOXAPARIN SODIUM 40 MG: 40 INJECTION SUBCUTANEOUS at 08:14

## 2018-10-31 RX ADMIN — Medication 10 ML: at 20:47

## 2018-10-31 RX ADMIN — HYDROMORPHONE HYDROCHLORIDE 0.5 MG: 1 INJECTION, SOLUTION INTRAMUSCULAR; INTRAVENOUS; SUBCUTANEOUS at 20:47

## 2018-10-31 RX ADMIN — SODIUM CHLORIDE: 9 INJECTION, SOLUTION INTRAVENOUS at 02:08

## 2018-10-31 RX ADMIN — HYDROMORPHONE HYDROCHLORIDE 0.5 MG: 1 INJECTION, SOLUTION INTRAMUSCULAR; INTRAVENOUS; SUBCUTANEOUS at 14:48

## 2018-10-31 RX ADMIN — SODIUM CHLORIDE 1000 ML: 9 INJECTION, SOLUTION INTRAVENOUS at 00:10

## 2018-10-31 RX ADMIN — CEFTRIAXONE SODIUM 1 G: 1 INJECTION, POWDER, FOR SOLUTION INTRAMUSCULAR; INTRAVENOUS at 21:58

## 2018-10-31 RX ADMIN — IPRATROPIUM BROMIDE AND ALBUTEROL SULFATE 1 AMPULE: .5; 3 SOLUTION RESPIRATORY (INHALATION) at 15:24

## 2018-10-31 RX ADMIN — SODIUM CHLORIDE, POTASSIUM CHLORIDE, SODIUM LACTATE AND CALCIUM CHLORIDE 1000 ML: 600; 310; 30; 20 INJECTION, SOLUTION INTRAVENOUS at 01:09

## 2018-10-31 ASSESSMENT — PAIN DESCRIPTION - LOCATION: LOCATION: ARM;RIB CAGE;BACK

## 2018-10-31 ASSESSMENT — PAIN SCALES - GENERAL
PAINLEVEL_OUTOF10: 10
PAINLEVEL_OUTOF10: 8
PAINLEVEL_OUTOF10: 9
PAINLEVEL_OUTOF10: 10
PAINLEVEL_OUTOF10: 8
PAINLEVEL_OUTOF10: 7
PAINLEVEL_OUTOF10: 10
PAINLEVEL_OUTOF10: 7
PAINLEVEL_OUTOF10: 10

## 2018-10-31 ASSESSMENT — PAIN DESCRIPTION - PAIN TYPE: TYPE: ACUTE PAIN

## 2018-10-31 ASSESSMENT — PAIN DESCRIPTION - ONSET: ONSET: ON-GOING

## 2018-10-31 ASSESSMENT — PAIN DESCRIPTION - ORIENTATION: ORIENTATION: RIGHT

## 2018-10-31 ASSESSMENT — PAIN DESCRIPTION - PROGRESSION: CLINICAL_PROGRESSION: NOT CHANGED

## 2018-10-31 ASSESSMENT — PAIN DESCRIPTION - FREQUENCY: FREQUENCY: CONTINUOUS

## 2018-10-31 ASSESSMENT — PAIN DESCRIPTION - DESCRIPTORS: DESCRIPTORS: ACHING

## 2018-10-31 NOTE — H&P
10/27/18 11/6/18 Yes Werner Lopez, DO   azithromycin (ZITHROMAX) 250 MG tablet Take 1 tab daily. 10/27/18   Werner Lopez, DO   nicotine polacrilex (NICORETTE) 2 MG gum Take 1 each by mouth as needed for Smoking cessation 16   Dimple Herron MD        Allergies:     Patient has no known allergies. Social History:     Tobacco:    reports that he has been smoking Cigarettes. He has a 2.00 pack-year smoking history. He does not have any smokeless tobacco history on file. Alcohol:      reports that he does not drink alcohol. Drug Use:  reports that he uses drugs about 1 time per week. Family History:     History reviewed. No pertinent family history. Review of Systems:     Positive and Negative as described in HPI. CONSTITUTIONAL:  negative for fevers, +ve  chills, sweats, fatigue, weight loss  HEENT:  negative for vision, hearing changes, runny nose, throat pain  RESPIRATORY:  ++ shortness of breath, cough, congestion, wheezing.   CARDIOVASCULAR:  negative for chest pain, palpitations  GASTROINTESTINAL:  negative for nausea, vomiting, diarrhea, constipation, change in bowel habits, abdominal pain   GENITOURINARY:  negative for difficulty of urination, burning with urination, frequency   INTEGUMENT:  negative for rash, skin lesions, easy bruising   HEMATOLOGIC/LYMPHATIC:  negative for swelling/edema   ALLERGIC/IMMUNOLOGIC:  negative for urticaria , itching  ENDOCRINE:  negative increase in drinking, increase in urination, hot or cold intolerance  MUSCULOSKELETAL:  negative joint pains, muscle aches, swelling of joints  NEUROLOGICAL:  negative for headaches, dizziness, lightheadedness, numbness, pain, tingling extremities  BEHAVIOR/PSYCH:  negative for depression, anxiety    Physical Exam:   /74   Pulse 91   Temp 98.1 °F (36.7 °C) (Oral)   Resp 23   Ht 6' 1\" (1.854 m)   Wt 186 lb 4.6 oz (84.5 kg)   SpO2 93%   BMI 24.58 kg/m²   Temp (24hrs), Av.8 °F (37.1 °C), Min:98.1 °F (36.7 °C), Max:99.7 °F (37.6 °C)    No results for input(s): POCGLU in the last 72 hours. Intake/Output Summary (Last 24 hours) at 10/31/18 2147  Last data filed at 10/31/18 1757   Gross per 24 hour   Intake          3185.47 ml   Output              800 ml   Net          2385.47 ml       General Appearance:  alert, well appearing, and in no acute distress  Mental status: oriented to person, place, and time with normal affect  Head:  normocephalic, atraumatic. Eye: no icterus, redness, pupils equal and reactive, extraocular eye movements intact, conjunctiva clear  Ear: normal external ear, no discharge, hearing intact  Nose:  no drainage noted  Mouth: mucous membranes moist  Neck: supple, no carotid bruits, thyroid not palpable  Lungs: diminished BS on RLL and RML, significant rales  Cardiovascular: normal rate, regular rhythm, no murmur, gallop, rub.   Abdomen: Soft, nontender, nondistended, normal bowel sounds, no hepatomegaly or splenomegaly  Neurologic: There are no new focal motor or sensory deficits, normal muscle tone and bulk, no abnormal sensation, normal speech, cranial nerves II through XII grossly intact  Skin: No gross lesions, rashes, bruising or bleeding on exposed skin area  Extremities:  peripheral pulses palpable, no pedal edema or calf pain with palpation  Psych: normal affect    Investigations:      Laboratory Testing:  Recent Results (from the past 24 hour(s))   EKG 12 Lead    Collection Time: 10/31/18 12:01 AM   Result Value Ref Range    Ventricular Rate 84 BPM    Atrial Rate 84 BPM    P-R Interval 152 ms    QRS Duration 82 ms    Q-T Interval 372 ms    QTc Calculation (Bazett) 439 ms    P Axis 53 degrees    R Axis 31 degrees    T Axis 40 degrees   CBC Auto Differential    Collection Time: 10/31/18 12:15 AM   Result Value Ref Range    WBC 15.8 (H) 3.5 - 11.3 k/uL    RBC 3.50 (L) 4.21 - 5.77 m/uL    Hemoglobin 10.6 (L) 13.0 - 17.0 g/dL    Hematocrit 28.8 (L) 40.7 - 50.3 %    MCV 82.3 (L) 82.6 - to patient if care not provided in a hospital setting.     Perla Alvarado MD  10/31/2018  9:47 PM    Copy sent to Dr. Swapna Lantigua MD

## 2018-10-31 NOTE — ED PROVIDER NOTES
Intrahepatic Sequester, Renal Infarct     DIAGNOSTIC RESULTS / EMERGENCY DEPARTMENT COURSE / MDM     LABS:  Results for orders placed or performed during the hospital encounter of 10/30/18   CBC Auto Differential   Result Value Ref Range    WBC 15.8 (H) 3.5 - 11.3 k/uL    RBC 3.50 (L) 4.21 - 5.77 m/uL    Hemoglobin 10.6 (L) 13.0 - 17.0 g/dL    Hematocrit 28.8 (L) 40.7 - 50.3 %    MCV 82.3 (L) 82.6 - 102.9 fL    MCH 30.3 25.2 - 33.5 pg    MCHC 36.8 (H) 28.4 - 34.8 g/dL    RDW 15.4 (H) 11.8 - 14.4 %    Platelets 526 539 - 077 k/uL    MPV 11.7 8.1 - 13.5 fL    NRBC Automated 0.4 (H) 0.0 per 100 WBC    Differential Type NOT REPORTED     Seg Neutrophils Pending %    Lymphocytes Pending %    Monocytes Pending %    Eosinophils % Pending %    Basophils Pending %    Immature Granulocytes Pending 0 %    Segs Absolute Pending k/uL    Absolute Lymph # Pending k/uL    Absolute Mono # Pending k/uL    Absolute Eos # Pending k/uL    Basophils # Pending 0.0 - 0.2 k/uL    Absolute Immature Granulocyte Pending 0.00 - 0.30 k/uL    WBC Morphology NOT REPORTED     RBC Morphology NOT REPORTED     Platelet Estimate NOT REPORTED    Reticulocytes   Result Value Ref Range    Retic % 5.5 (H) 0.5 - 1.9 %    Absolute Retic # 0.182 (H) 0.030 - 0.080 M/uL    Immature Retic Fract 19.900 (H) 2.7 - 18.3 %    Retic Hemoglobin 28.4 28.2 - 35.7 pg   Comprehensive Metabolic Panel   Result Value Ref Range    Glucose 110 (H) 70 - 99 mg/dL    BUN 8 6 - 20 mg/dL    CREATININE 0.64 (L) 0.70 - 1.20 mg/dL    Bun/Cre Ratio NOT REPORTED 9 - 20    Calcium 8.8 8.6 - 10.4 mg/dL    Sodium 135 135 - 144 mmol/L    Potassium 4.4 3.7 - 5.3 mmol/L    Chloride 98 98 - 107 mmol/L    CO2 24 20 - 31 mmol/L    Anion Gap 13 9 - 17 mmol/L    Alkaline Phosphatase 85 40 - 129 U/L    ALT 35 5 - 41 U/L    AST 64 (H) <40 U/L    Total Bilirubin 3.92 (H) 0.3 - 1.2 mg/dL    Total Protein 7.2 6.4 - 8.3 g/dL    Alb 3.8 3.5 - 5.2 g/dL    Albumin/Globulin Ratio 1.1 1.0 - 2.5    GFR

## 2018-11-01 ENCOUNTER — APPOINTMENT (OUTPATIENT)
Dept: CT IMAGING | Age: 23
DRG: 139 | End: 2018-11-01
Payer: MEDICAID

## 2018-11-01 LAB
ABSOLUTE RETIC #: 0.16 M/UL (ref 0.03–0.08)
ANION GAP SERPL CALCULATED.3IONS-SCNC: 10 MMOL/L (ref 9–17)
BUN BLDV-MCNC: 4 MG/DL (ref 6–20)
BUN/CREAT BLD: ABNORMAL (ref 9–20)
CALCIUM SERPL-MCNC: 7.9 MG/DL (ref 8.6–10.4)
CHLORIDE BLD-SCNC: 104 MMOL/L (ref 98–107)
CO2: 25 MMOL/L (ref 20–31)
CREAT SERPL-MCNC: 0.43 MG/DL (ref 0.7–1.2)
GFR AFRICAN AMERICAN: >60 ML/MIN
GFR NON-AFRICAN AMERICAN: >60 ML/MIN
GFR SERPL CREATININE-BSD FRML MDRD: ABNORMAL ML/MIN/{1.73_M2}
GFR SERPL CREATININE-BSD FRML MDRD: ABNORMAL ML/MIN/{1.73_M2}
GLUCOSE BLD-MCNC: 88 MG/DL (ref 70–99)
HCT VFR BLD CALC: 25.4 % (ref 40.7–50.3)
HEMOGLOBIN: 9.2 G/DL (ref 13–17)
IMMATURE RETIC FRACT: 28.8 % (ref 2.7–18.3)
MCH RBC QN AUTO: 30.2 PG (ref 25.2–33.5)
MCHC RBC AUTO-ENTMCNC: 36.2 G/DL (ref 28.4–34.8)
MCV RBC AUTO: 83.3 FL (ref 82.6–102.9)
NRBC AUTOMATED: 0.7 PER 100 WBC
PDW BLD-RTO: 16.9 % (ref 11.8–14.4)
PLATELET # BLD: 360 K/UL (ref 138–453)
PMV BLD AUTO: 12.7 FL (ref 8.1–13.5)
POTASSIUM SERPL-SCNC: 4.7 MMOL/L (ref 3.7–5.3)
RBC # BLD: 3.05 M/UL (ref 4.21–5.77)
RETIC %: 5.1 % (ref 0.5–1.9)
RETIC HEMOGLOBIN: 28.6 PG (ref 28.2–35.7)
SODIUM BLD-SCNC: 139 MMOL/L (ref 135–144)
WBC # BLD: 12.6 K/UL (ref 3.5–11.3)

## 2018-11-01 PROCEDURE — 6360000002 HC RX W HCPCS: Performed by: NURSE PRACTITIONER

## 2018-11-01 PROCEDURE — 6370000000 HC RX 637 (ALT 250 FOR IP): Performed by: NURSE PRACTITIONER

## 2018-11-01 PROCEDURE — 6370000000 HC RX 637 (ALT 250 FOR IP): Performed by: FAMILY MEDICINE

## 2018-11-01 PROCEDURE — 99232 SBSQ HOSP IP/OBS MODERATE 35: CPT | Performed by: FAMILY MEDICINE

## 2018-11-01 PROCEDURE — 36415 COLL VENOUS BLD VENIPUNCTURE: CPT

## 2018-11-01 PROCEDURE — 94640 AIRWAY INHALATION TREATMENT: CPT

## 2018-11-01 PROCEDURE — 71250 CT THORAX DX C-: CPT

## 2018-11-01 PROCEDURE — 2580000003 HC RX 258: Performed by: NURSE PRACTITIONER

## 2018-11-01 PROCEDURE — 85045 AUTOMATED RETICULOCYTE COUNT: CPT

## 2018-11-01 PROCEDURE — 1200000000 HC SEMI PRIVATE

## 2018-11-01 PROCEDURE — 99231 SBSQ HOSP IP/OBS SF/LOW 25: CPT | Performed by: INTERNAL MEDICINE

## 2018-11-01 PROCEDURE — 85027 COMPLETE CBC AUTOMATED: CPT

## 2018-11-01 PROCEDURE — 80048 BASIC METABOLIC PNL TOTAL CA: CPT

## 2018-11-01 PROCEDURE — 99254 IP/OBS CNSLTJ NEW/EST MOD 60: CPT | Performed by: INTERNAL MEDICINE

## 2018-11-01 RX ORDER — OXYCODONE HYDROCHLORIDE AND ACETAMINOPHEN 5; 325 MG/1; MG/1
2 TABLET ORAL EVERY 4 HOURS PRN
Status: DISCONTINUED | OUTPATIENT
Start: 2018-11-01 | End: 2018-11-02

## 2018-11-01 RX ADMIN — HYDROMORPHONE HYDROCHLORIDE 0.5 MG: 1 INJECTION, SOLUTION INTRAMUSCULAR; INTRAVENOUS; SUBCUTANEOUS at 03:19

## 2018-11-01 RX ADMIN — CEFTRIAXONE SODIUM 1 G: 1 INJECTION, POWDER, FOR SOLUTION INTRAMUSCULAR; INTRAVENOUS at 21:51

## 2018-11-01 RX ADMIN — MAGNESIUM HYDROXIDE 30 ML: 400 SUSPENSION ORAL at 08:20

## 2018-11-01 RX ADMIN — DOCUSATE SODIUM 100 MG: 100 CAPSULE, LIQUID FILLED ORAL at 08:20

## 2018-11-01 RX ADMIN — OXYCODONE HYDROCHLORIDE AND ACETAMINOPHEN 2 TABLET: 5; 325 TABLET ORAL at 10:38

## 2018-11-01 RX ADMIN — OXYCODONE HYDROCHLORIDE AND ACETAMINOPHEN 2 TABLET: 5; 325 TABLET ORAL at 21:51

## 2018-11-01 RX ADMIN — AZITHROMYCIN MONOHYDRATE 500 MG: 500 INJECTION, POWDER, LYOPHILIZED, FOR SOLUTION INTRAVENOUS at 22:52

## 2018-11-01 RX ADMIN — ENOXAPARIN SODIUM 40 MG: 40 INJECTION SUBCUTANEOUS at 08:20

## 2018-11-01 RX ADMIN — HYDROMORPHONE HYDROCHLORIDE 0.5 MG: 1 INJECTION, SOLUTION INTRAMUSCULAR; INTRAVENOUS; SUBCUTANEOUS at 07:23

## 2018-11-01 RX ADMIN — DOCUSATE SODIUM 100 MG: 100 CAPSULE, LIQUID FILLED ORAL at 21:51

## 2018-11-01 RX ADMIN — IPRATROPIUM BROMIDE AND ALBUTEROL SULFATE 1 AMPULE: .5; 3 SOLUTION RESPIRATORY (INHALATION) at 12:46

## 2018-11-01 RX ADMIN — HYDROMORPHONE HYDROCHLORIDE 0.5 MG: 1 INJECTION, SOLUTION INTRAMUSCULAR; INTRAVENOUS; SUBCUTANEOUS at 00:12

## 2018-11-01 RX ADMIN — OXYCODONE HYDROCHLORIDE AND ACETAMINOPHEN 2 TABLET: 5; 325 TABLET ORAL at 14:53

## 2018-11-01 ASSESSMENT — PAIN SCALES - GENERAL
PAINLEVEL_OUTOF10: 10
PAINLEVEL_OUTOF10: 8
PAINLEVEL_OUTOF10: 10
PAINLEVEL_OUTOF10: 7
PAINLEVEL_OUTOF10: 10
PAINLEVEL_OUTOF10: 7
PAINLEVEL_OUTOF10: 9

## 2018-11-01 ASSESSMENT — PAIN DESCRIPTION - ONSET
ONSET: ON-GOING
ONSET: ON-GOING

## 2018-11-01 ASSESSMENT — PAIN DESCRIPTION - PAIN TYPE
TYPE: ACUTE PAIN
TYPE: ACUTE PAIN

## 2018-11-01 ASSESSMENT — PAIN DESCRIPTION - FREQUENCY
FREQUENCY: CONTINUOUS
FREQUENCY: CONTINUOUS

## 2018-11-01 ASSESSMENT — PAIN DESCRIPTION - LOCATION
LOCATION: ARM;BACK;SHOULDER
LOCATION: CHEST;BACK;RIB CAGE

## 2018-11-01 ASSESSMENT — PAIN DESCRIPTION - PROGRESSION
CLINICAL_PROGRESSION: GRADUALLY IMPROVING
CLINICAL_PROGRESSION: NOT CHANGED

## 2018-11-01 ASSESSMENT — PAIN DESCRIPTION - DESCRIPTORS
DESCRIPTORS: ACHING
DESCRIPTORS: CONSTANT;DISCOMFORT

## 2018-11-01 ASSESSMENT — PAIN DESCRIPTION - ORIENTATION
ORIENTATION: RIGHT;LEFT
ORIENTATION: RIGHT;LEFT

## 2018-11-01 NOTE — PROGRESS NOTES
10 mL Intravenous 2 times per day    docusate sodium  100 mg Oral BID    enoxaparin  40 mg Subcutaneous Daily    ipratropium-albuterol  1 ampule Inhalation Q4H WA    azithromycin  500 mg Intravenous Q24H    And    cefTRIAXone (ROCEPHIN) IV  1 g Intravenous Q24H     Continuous Infusions:    sodium chloride 125 mL/hr at 10/31/18 1448     PRN Meds: sodium chloride flush, magnesium hydroxide, bisacodyl, ondansetron, nicotine, albuterol, acetaminophen, HYDROmorphone **OR** HYDROmorphone    Data:     Past Medical History:   has a past medical history of History of blood transfusion; Sickle cell anemia ([facility] [address] Sickle cell disease, type SC ([facility] [address] and Unspecified diseases of blood and blood-forming organs. Social History:   reports that he has been smoking Cigarettes. He has a 2.00 pack-year smoking history. He does not have any smokeless tobacco history on file. He reports that he uses drugs about 1 time per week. He reports that he does not drink alcohol. Family History: History reviewed. No pertinent family history. Vitals:  /67   Pulse 85   Temp 98.3 °F (36.8 °C) (Oral)   Resp 22   Ht 6' 1\" (1.854 m)   Wt 186 lb 4.6 oz (84.5 kg)   SpO2 97%   BMI 24.58 kg/m²   Temp (24hrs), Av.5 °F (36.9 °C), Min:98.1 °F (36.7 °C), Max:99.2 °F (37.3 °C)    No results for input(s): POCGLU in the last 72 hours. I/O (24Hr):     Intake/Output Summary (Last 24 hours) at 18 0954  Last data filed at 18 0542   Gross per 24 hour   Intake             4355 ml   Output             1000 ml   Net             3355 ml       Labs:    Hematology:  Recent Labs      10/31/18   0015  18   0554   WBC  15.8*  12.6*   RBC  3.50*  3.05*   HGB  10.6*  9.2*   HCT  28.8*  25.4*   MCV  82.3*  83.3   MCH  30.3  30.2   MCHC  36.8*  36.2*   RDW  15.4*  16.9*   PLT  251  360   MPV  11.7  12.7   INR  1.0   --      Chemistry:  Recent Labs      10/31/18   0015  18   0554   NA  135  139   K  4.4  4.7   CL  98  104

## 2018-11-01 NOTE — CONSULTS
PULMONARY & CRITICAL CARE MEDICINE CONSULT NOTE     Patient:  Skylar Briceño  MRN: [id number]  Admit date: 10/30/2018    Primary Care Physician: Foreign Graham MD  Consulting Physician: Nilam Whitman MD  Reason for Consult: Pneumonia     HISTORY     HISTORY OF PRESENT ILLNESS:   The patient is a 21 y.o. male with past medical history of sickle cell disease, pulmonary embolism on the right side was on Xarelto at some point came into the hospital because of complaints of shortness of breath, dyspnea and chest pain. Patient was started on azithromycin and Rocephin. CT chest showed multilobar infiltrate on the right side. Patient has remained afebrile complains of some cough but is not able to cough due to pain. She was admitted to the hospital around 1 week back with similar complaints and was discharged later on azithromycin and cefuroxime. In CT scan done on this admission and previous admission were compared it showed worsening of the infiltrate on the right side.  is a current smoker and continues to smoke. PAST MEDICAL HISTORY:        Diagnosis Date    History of blood transfusion 2x    Sickle cell anemia (HCC)     Sickle cell disease, type SC ([facility] [address] birth    Unspecified diseases of blood and blood-forming organs Sickle Cell     PAST SURGICAL HISTORY:        Procedure Laterality Date    CHOLECYSTECTOMY, LAPAROSCOPIC  1990    KNEE SURGERY Left 2008    cut by glass,     LAPAROSCOPIC SPLENECTOMY Left 9/2007    PENIS SURGERY  [id number]    ASPIRATION (PRIAPISM)     FAMILY HISTORY:   History reviewed. No pertinent family history. SOCIAL HISTORY:   TOBACCO:  He is smokes half pack a day for many years. ETOH:  reports that he does not drink alcohol. DRUGS: reports that he uses drugs about 1 time per week. AVOCATION/OCCUPATIONAL EXPOSURE HISTORY:    There is no  history of exposure to asbestos or silica dust.  The patient denies coal, foundry, quarry or [employer] exposure.   The patient does not Results   Component Value Date     11/01/2018    K 4.7 11/01/2018     11/01/2018    CO2 25 11/01/2018    BUN 4 11/01/2018    CREATININE 0.43 11/01/2018    GLUCOSE 88 11/01/2018    CALCIUM 7.9 11/01/2018       Recent Labs      10/31/18   0015  11/01/18   0554   NA  135  139   K  4.4  4.7   CL  98  104   CO2  24  25   BUN  8  4*   CREATININE  0.64*  0.43*   GLUCOSE  110*  88     Liver Function Test:   Lab Results   Component Value Date    ALT 35 10/31/2018    AST 64 (H) 10/31/2018    ALKPHOS 85 10/31/2018    BILITOT 3.92 (H) 10/31/2018     Coagulation Profile:   Lab Results   Component Value Date    INR 1.0 10/31/2018    PROTIME 10.5 10/31/2018    APTT 26.9 10/31/2018     Cardiac Enzymes:  Lab Results   Component Value Date    TROPONINI 0.00 10/24/2018     No results for input(s): CKTOTAL, CKMB, CKMBINDEX, TROPONINI in the last 72 hours. Lactic Acid:  No results found for: LACTA  No results for input(s): LACTA in the last 72 hours. BNP:   No results found for: BNP  No results for input(s): BNP in the last 72 hours. D-Dimer:  No results found for: DDIMER  Thyroid Function:   No results found for: TSH, E0HLMVZ, G7FOBOX, THYROIDAB, FT3, T4FREE  Immune/Rheum Panel:  No results found for: SHIRA, RHEUMFACTOR, SEDRATE, CRP  No results found for: LABURIC, URICACID  Heme/Onc Panel:  Lab Results   Component Value Date    FERRITIN 408 (H) 09/08/2014     No results found for: SPEP, UPEP  No results found for: PSA, CEA, , BH9295,       ASSESSMENT AND PLAN     Patient Active Problem List   Diagnosis    Sickle cell pain crisis ([facility] [address]    Sickle cell disease, type SC ([facility] [address]    Fever    Lung infiltrate    Sickle cell anemia ([facility] [address]    Priapism    Pulmonary embolus ([facility] [address]    Pneumonia due to organism    Tobacco abuse    History of pulmonary embolism    Vasculogenic erectile dysfunction    Pneumonia    Shortness of breath    Chest pain    Leukocytosis     CT Chest 10-31-18  1.  Progressing right lower lobe

## 2018-11-02 LAB
ABSOLUTE EOS #: <0.03 K/UL (ref 0–0.44)
ABSOLUTE IMMATURE GRANULOCYTE: 0.16 K/UL (ref 0–0.3)
ABSOLUTE LYMPH #: 3.7 K/UL (ref 1.1–3.7)
ABSOLUTE MONO #: 1.35 K/UL (ref 0.1–1.2)
ABSOLUTE RETIC #: 0.25 M/UL (ref 0.03–0.08)
BASOPHILS # BLD: 1 % (ref 0–2)
BASOPHILS ABSOLUTE: 0.12 K/UL (ref 0–0.2)
DIFFERENTIAL TYPE: ABNORMAL
EOSINOPHILS RELATIVE PERCENT: 0 % (ref 1–4)
HCT VFR BLD CALC: 29.3 % (ref 40.7–50.3)
HEMOGLOBIN: 10.5 G/DL (ref 13–17)
IMMATURE GRANULOCYTES: 1 %
IMMATURE RETIC FRACT: 33.9 % (ref 2.7–18.3)
LYMPHOCYTES # BLD: 28 % (ref 24–43)
MCH RBC QN AUTO: 30 PG (ref 25.2–33.5)
MCHC RBC AUTO-ENTMCNC: 35.8 G/DL (ref 28.4–34.8)
MCV RBC AUTO: 83.7 FL (ref 82.6–102.9)
MONOCYTES # BLD: 10 % (ref 3–12)
NRBC AUTOMATED: 0.6 PER 100 WBC
PDW BLD-RTO: 16.4 % (ref 11.8–14.4)
PLATELET # BLD: 367 K/UL (ref 138–453)
PLATELET ESTIMATE: ABNORMAL
PMV BLD AUTO: 12.2 FL (ref 8.1–13.5)
RBC # BLD: 3.5 M/UL (ref 4.21–5.77)
RBC # BLD: ABNORMAL 10*6/UL
RETIC %: 7.2 % (ref 0.5–1.9)
RETIC HEMOGLOBIN: 29.8 PG (ref 28.2–35.7)
SEG NEUTROPHILS: 60 % (ref 36–65)
SEGMENTED NEUTROPHILS ABSOLUTE COUNT: 7.87 K/UL (ref 1.5–8.1)
WBC # BLD: 13.2 K/UL (ref 3.5–11.3)
WBC # BLD: ABNORMAL 10*3/UL

## 2018-11-02 PROCEDURE — 6360000002 HC RX W HCPCS: Performed by: NURSE PRACTITIONER

## 2018-11-02 PROCEDURE — 6370000000 HC RX 637 (ALT 250 FOR IP): Performed by: NURSE PRACTITIONER

## 2018-11-02 PROCEDURE — 94640 AIRWAY INHALATION TREATMENT: CPT

## 2018-11-02 PROCEDURE — 2580000003 HC RX 258: Performed by: FAMILY MEDICINE

## 2018-11-02 PROCEDURE — 6370000000 HC RX 637 (ALT 250 FOR IP): Performed by: FAMILY MEDICINE

## 2018-11-02 PROCEDURE — 1200000000 HC SEMI PRIVATE

## 2018-11-02 PROCEDURE — 85045 AUTOMATED RETICULOCYTE COUNT: CPT

## 2018-11-02 PROCEDURE — 99232 SBSQ HOSP IP/OBS MODERATE 35: CPT | Performed by: INTERNAL MEDICINE

## 2018-11-02 PROCEDURE — 99232 SBSQ HOSP IP/OBS MODERATE 35: CPT | Performed by: FAMILY MEDICINE

## 2018-11-02 PROCEDURE — 99233 SBSQ HOSP IP/OBS HIGH 50: CPT | Performed by: INTERNAL MEDICINE

## 2018-11-02 PROCEDURE — 36415 COLL VENOUS BLD VENIPUNCTURE: CPT

## 2018-11-02 PROCEDURE — 85025 COMPLETE CBC W/AUTO DIFF WBC: CPT

## 2018-11-02 PROCEDURE — 2580000003 HC RX 258: Performed by: NURSE PRACTITIONER

## 2018-11-02 PROCEDURE — 94762 N-INVAS EAR/PLS OXIMTRY CONT: CPT

## 2018-11-02 RX ORDER — OXYCODONE HYDROCHLORIDE AND ACETAMINOPHEN 5; 325 MG/1; MG/1
2 TABLET ORAL EVERY 6 HOURS PRN
Status: DISCONTINUED | OUTPATIENT
Start: 2018-11-02 | End: 2018-11-03 | Stop reason: HOSPADM

## 2018-11-02 RX ORDER — AZITHROMYCIN 250 MG/1
500 TABLET, FILM COATED ORAL DAILY
Status: DISCONTINUED | OUTPATIENT
Start: 2018-11-02 | End: 2018-11-02

## 2018-11-02 RX ORDER — LEVOFLOXACIN 750 MG/1
750 TABLET ORAL DAILY
Status: DISCONTINUED | OUTPATIENT
Start: 2018-11-03 | End: 2018-11-03 | Stop reason: HOSPADM

## 2018-11-02 RX ADMIN — IPRATROPIUM BROMIDE AND ALBUTEROL SULFATE 1 AMPULE: .5; 3 SOLUTION RESPIRATORY (INHALATION) at 16:02

## 2018-11-02 RX ADMIN — OXYCODONE HYDROCHLORIDE AND ACETAMINOPHEN 2 TABLET: 5; 325 TABLET ORAL at 11:58

## 2018-11-02 RX ADMIN — ENOXAPARIN SODIUM 40 MG: 40 INJECTION SUBCUTANEOUS at 09:26

## 2018-11-02 RX ADMIN — IPRATROPIUM BROMIDE AND ALBUTEROL SULFATE 1 AMPULE: .5; 3 SOLUTION RESPIRATORY (INHALATION) at 08:09

## 2018-11-02 RX ADMIN — AZITHROMYCIN 500 MG: 250 TABLET, FILM COATED ORAL at 20:09

## 2018-11-02 RX ADMIN — OXYCODONE HYDROCHLORIDE AND ACETAMINOPHEN 2 TABLET: 5; 325 TABLET ORAL at 02:42

## 2018-11-02 RX ADMIN — MAGNESIUM HYDROXIDE 30 ML: 400 SUSPENSION ORAL at 18:04

## 2018-11-02 RX ADMIN — OXYCODONE HYDROCHLORIDE AND ACETAMINOPHEN 2 TABLET: 5; 325 TABLET ORAL at 07:27

## 2018-11-02 RX ADMIN — DOCUSATE SODIUM 100 MG: 100 CAPSULE, LIQUID FILLED ORAL at 09:26

## 2018-11-02 RX ADMIN — DOCUSATE SODIUM 100 MG: 100 CAPSULE, LIQUID FILLED ORAL at 20:09

## 2018-11-02 RX ADMIN — Medication 10 ML: at 20:09

## 2018-11-02 RX ADMIN — SODIUM CHLORIDE: 9 INJECTION, SOLUTION INTRAVENOUS at 09:26

## 2018-11-02 RX ADMIN — OXYCODONE HYDROCHLORIDE AND ACETAMINOPHEN 2 TABLET: 5; 325 TABLET ORAL at 18:04

## 2018-11-02 ASSESSMENT — PAIN SCALES - GENERAL
PAINLEVEL_OUTOF10: 0
PAINLEVEL_OUTOF10: 9
PAINLEVEL_OUTOF10: 6
PAINLEVEL_OUTOF10: 9
PAINLEVEL_OUTOF10: 0
PAINLEVEL_OUTOF10: 8

## 2018-11-02 NOTE — PROGRESS NOTES
PH, PCO2, PO2, HCO3, O2SAT  CBC:   Lab Results   Component Value Date    WBC 13.2 (H) 11/02/2018    HGB 10.5 (L) 11/02/2018    HCT 29.3 (L) 11/02/2018    MCV 83.7 11/02/2018     11/02/2018    LYMPHOPCT 28 11/02/2018    RBC 3.50 (L) 11/02/2018    MCH 30.0 11/02/2018    MCHC 35.8 (H) 11/02/2018    RDW 16.4 (H) 11/02/2018     Recent Labs      10/31/18   0015  11/01/18   0554  11/02/18   0500   WBC  15.8*  12.6*  13.2*   HGB  10.6*  9.2*  10.5*   PLT  251  360  367     Lab Results   Component Value Date    NEUTROABS 7.87 11/02/2018      BMP:   Lab Results   Component Value Date     11/01/2018    K 4.7 11/01/2018     11/01/2018    CO2 25 11/01/2018    BUN 4 11/01/2018    CREATININE 0.43 11/01/2018    GLUCOSE 88 11/01/2018    CALCIUM 7.9 11/01/2018       Recent Labs      10/31/18   0015  11/01/18   0554   NA  135  139   K  4.4  4.7   CL  98  104   CO2  24  25   BUN  8  4*   CREATININE  0.64*  0.43*   GLUCOSE  110*  88     Liver Function Test:   Lab Results   Component Value Date    ALT 35 10/31/2018    AST 64 (H) 10/31/2018    ALKPHOS 85 10/31/2018    BILITOT 3.92 (H) 10/31/2018     Coagulation Profile:   Lab Results   Component Value Date    INR 1.0 10/31/2018    PROTIME 10.5 10/31/2018    APTT 26.9 10/31/2018     Cardiac Enzymes:  Lab Results   Component Value Date    TROPONINI 0.00 10/24/2018     No results for input(s): CKTOTAL, CKMB, CKMBINDEX, TROPONINI in the last 72 hours. Lactic Acid:  No results found for: LACTA  No results for input(s): LACTA in the last 72 hours. BNP:   No results found for: BNP  No results for input(s): BNP in the last 72 hours.   D-Dimer:  No results found for: DDIMER  Thyroid Function:   No results found for: TSH, D3TFVXN, X1UMQFR, THYROIDAB, FT3, T4FREE  Immune/Rheum Panel:  No results found for: SHIRA, RHEUMFACTOR, SEDRATE, CRP  No results found for: LABURIC, URICACID  Heme/Onc Panel:  Lab Results   Component Value Date    FERRITIN 408 (H) 09/08/2014 to most recent chest  radiograph. Findings compatible with pneumonia. Follow-up imaging after  appropriate therapy is advised to exclude neoplastic process but this is felt  to be unlikely in a patient of this age. 2. Reactive adenopathy in the mediastinum. ASSESSMENT AND PLAN     Assessment:  -Comminuted acquired pneumonia versus acute coronary syndrome. -H/O of pulmonary embolism s/p 6 months of anticoagulation   - Sickle cell disease  - Smoker     Plan:  - Continue current management with azithromycin and Rocephin. - Will give Acapella - to assist with expectoration   -Continue bronchodilation with DuoNeb 4 times a day. -Pain control and hydration  -Sputum Gram stain and cultures  - Will continue to follow           Radha Higginbotham, PGY-3, Internal Medicine Residency Resident. [facility]      Attending Physician Statement  I have discussed the care of Brett Griffith, including pertinent history and exam findings with the resident. I have reviewed the key elements of all parts of the encounter with the resident. I have seen and examined the patient with the resident. I agree with the assessment and plan and status of the problem list as documented. I reviewed Luciano Hansen, chart seen, lab seen. He still complaining of chest pain on the right side, he does not have any cough and no sputum production, he is afebrile and he is ambulating without much problem. He denies any pain in the extremities, he denies shortness of breath and he is on room air and very comfortable.   Chest x-rays and CT scan of the chest seen, CT scan of the chest on October 24 shows mild right lower lobe infiltrate and no evidence of pulmonary embolism at that time when he presented initially with right-sided chest pain and CT scan of the chest on November 1 shows increased right lower lobe consolidation with air bronchograms and no pleural effusion, also chest x-ray compared to October 25 and October 31 shows increasing consolidation in right lower lobe on October 31 chest x-ray. Clinically patient is a stable afebrile white cell count is stable. He is currently on Rocephin and Zithromax. Will need Levaquin 750 mg for 7 days on discharge. Jeremías Regan MD  11/2/2018 10:38 PM     Please note that this chart was generated using voice recognition Dragon dictation software. Although every effort was made to ensure the accuracy of this automated transcription, some errors in transcription may have occurred.

## 2018-11-02 NOTE — PROGRESS NOTES
not on file. Family history was reviewed and no pertinent family history noted. REVIEW OF SYSTEMS:    Constitutional: No fever or chills. No night sweats, no weight loss   Eyes: No eye discharge, double vision, or eye pain   HEENT: negative for sore mouth, sore throat, hoarseness and voice change   Respiratory: + cough , sputum, dyspnea, wheezing, hemoptysis,+ chest pain   Cardiovascular: negative for chest pain, dyspnea, palpitations, orthopnea, PND   Gastrointestinal: negative for nausea, vomiting, diarrhea, constipation, abdominal pain, Dysphagia, hematemesis and hematochezia   Genitourinary: negative for frequency, dysuria, nocturia, urinary incontinence, and hematuria   Integument: negative for rash, skin lesions, bruises.    Hematologic/Lymphatic: negative for easy bruising, bleeding, lymphadenopathy, or petechiae   Endocrine: negative for heat or cold intolerance,weight changes, change in bowel habits and hair loss   Musculoskeletal: negative for myalgias, arthralgias, pain, joint swelling,and bone pain   Neurological: negative for headaches, dizziness, seizures, weakness, numbness    PHYSICAL EXAM:      /78   Pulse 75   Temp 98.6 °F (37 °C) (Oral)   Resp 18   Ht 6' 1\" (1.854 m)   Wt 186 lb 4.6 oz (84.5 kg)   SpO2 95%   BMI 24.58 kg/m²    Temp (24hrs), Av.6 °F (37 °C), Min:98.1 °F (36.7 °C), Max:99.2 °F (37.3 °C)    General appearance - well appearing, no in pain or distress   Mental status - alert and cooperative   Eyes - pupils equal and reactive, extraocular eye movements intact   Ears - bilateral TM's and external ear canals normal   Mouth - mucous membranes moist, pharynx normal without lesions   Neck - supple, no significant adenopathy   Lymphatics - no palpable lymphadenopathy, no hepatosplenomegaly   Chest - clear to auscultation, no wheezes, rales or rhonchi, symmetric air entry   Heart - normal rate, regular rhythm, normal S1, S2, no murmurs  Abdomen - soft, nontender, nondistended, no masses or organomegaly   Neurological - alert, oriented, normal speech, no focal findings or movement disorder noted   Musculoskeletal - no joint tenderness, deformity or swelling   Extremities - peripheral pulses normal, no pedal edema, no clubbing or cyanosis   Skin - normal coloration and turgor, no rashes, no suspicious skin lesions noted ,    DATA:    Labs:   CBC:   Recent Labs      10/31/18   0015  11/01/18   0554   WBC  15.8*  12.6*   HGB  10.6*  9.2*   HCT  28.8*  25.4*   PLT  251  360     BMP:   Recent Labs      10/31/18   0015  11/01/18   0554   NA  135  139   K  4.4  4.7   CO2  24  25   BUN  8  4*   CREATININE  0.64*  0.43*   LABGLOM  >60  >60   GLUCOSE  110*  88     PT/INR:   Recent Labs      10/31/18   0015   PROTIME  10.5   INR  1.0       4/13/2015 10:43 AM - Sebastian, Lab Incoming Levels Beyond System     Component Collected Lab   Hgb Electrophoresis Interp 04/09/2015  1:08 PM [facility]    HB ELECTROPHORESIS EXHIBITS HBS AND HBC. CONFIRMED BY ACID GEL ELECTROPHORESIS. Comment:     IMPRESSION: HBSC DISEASE ([address] [place] [address]).   HBS = 51.9%. [address] = 48.1%. Pathologist 04/09/2015  1:08 PM [facility]   ELECTRONICALLY SIGNED. Connor Estrada M.D. Comment:   [employer] [address] [facility], [address] [place] [address] [phone]           Primary Problem  Pneumonia due to organism    Active Hospital Problems    Diagnosis Date Noted    Pneumonia [J18.9] 10/31/2018    Shortness of breath [R06.02]     Chest pain [R07.9]     Leukocytosis [D72.829]     History of pulmonary embolism [Z86.711] 10/24/2018    Pneumonia due to organism [J18.9] 10/24/2018    Tobacco abuse [Z72.0] 10/24/2018     IMPRESSION:   1. Hemoglobin SC disease with crisis  2. Pneumonia  3. Pain management  4. Leukocytosis    RECOMMENDATIONS:  Await pulm input, I feel his changes in th e lung are chronic  Continue supportive care     Discussed with patient and Nurse. Thank you for asking us to see this patient.     Robert KENNEY

## 2018-11-02 NOTE — PROGRESS NOTES
Subcutaneous Daily    ipratropium-albuterol  1 ampule Inhalation Q4H WA    azithromycin  500 mg Intravenous Q24H    And    cefTRIAXone (ROCEPHIN) IV  1 g Intravenous Q24H     Continuous Infusions:    sodium chloride 125 mL/hr at 10/31/18 1448     PRN Meds: oxyCODONE-acetaminophen, sodium chloride flush, magnesium hydroxide, bisacodyl, ondansetron, nicotine, albuterol, acetaminophen    Data:     Past Medical History:   has a past medical history of History of blood transfusion; Sickle cell anemia ([facility] [address] Sickle cell disease, type SC ([facility] [address] and Unspecified diseases of blood and blood-forming organs. Social History:   reports that he has been smoking Cigarettes. He has a 2.00 pack-year smoking history. He does not have any smokeless tobacco history on file. He reports that he uses drugs about 1 time per week. He reports that he does not drink alcohol. Family History: History reviewed. No pertinent family history. Vitals:  /78   Pulse 75   Temp 98.6 °F (37 °C) (Oral)   Resp 18   Ht 6' 1\" (1.854 m)   Wt 186 lb 4.6 oz (84.5 kg)   SpO2 95%   BMI 24.58 kg/m²   Temp (24hrs), Av.4 °F (36.9 °C), Min:98.1 °F (36.7 °C), Max:98.6 °F (37 °C)    No results for input(s): POCGLU in the last 72 hours. I/O (24Hr):   No intake or output data in the 24 hours ending 18 0823    Labs:    Hematology:  Recent Labs      10/31/18   0015  18   0554  18   0500   WBC  15.8*  12.6*  13.2*   RBC  3.50*  3.05*  3.50*   HGB  10.6*  9.2*  10.5*   HCT  28.8*  25.4*  29.3*   MCV  82.3*  83.3  83.7   MCH  30.3  30.2  30.0   MCHC  36.8*  36.2*  35.8*   RDW  15.4*  16.9*  16.4*   PLT  251  360  367   MPV  11.7  12.7  12.2   INR  1.0   --    --      Chemistry:  Recent Labs      10/31/18   0015  18   0554   NA  135  139   K  4.4  4.7   CL  98  104   CO2  24  25   GLUCOSE  110*  88   BUN  8  4*   CREATININE  0.64*  0.43*   ANIONGAP  13  10   LABGLOM  >60  >60   GFRAA  >60  >60   CALCIUM  8.8  7.9* TROPONINT  <0.03   --      Recent Labs      10/31/18   0015   PROT  7.2   LABALBU  3.8   AST  64*   ALT  35   LDH  428*   ALKPHOS  85   BILITOT  3.92*         Lab Results   Component Value Date/Time    SPECIAL NOT REPORTED 10/31/2018 01:17 AM     Lab Results   Component Value Date/Time    CULTURE NO GROWTH 2 DAYS 10/31/2018 01:17 AM       No results found for: POCPH, PHART, PH, POCPCO2, PMK7JGD, PCO2, POCPO2, PO2ART, PO2, POCHCO3, OOU6WWX, HCO3, NBEA, PBEA, BEART, BE, THGBART, THB, FLW4NSR, ZKXX0WVF, Z8CURXZL, O2SAT, FIO2    Radiology:    TWO VIEWS OF THE CHEST 10/31/2018 12:23 am  Again seen is right lower lobe airspace opacity, unchanged since prior study. Upper right lung and left lung are clear.  Cardiomediastinal silhouette and  pulmonary vasculature stable and within limits.  No pneumothorax.  No acute  osseous abnormality       Physical Examination:        General appearance:  alert, cooperative and no distress  Mental Status:  oriented to person, place and time and normal affect  Lungs:  diminished BS and rales on RLL, improved aeration on  RML. Heart:  regular rate and rhythm, no murmur  Abdomen:  soft, nontender, nondistended, normal bowel sounds, no masses, hepatomegaly, splenomegaly  Extremities:  no edema, redness, tenderness in the calves  Skin:  no gross lesions, rashes, induration    Assessment:        Primary Problem  Pneumonia due to organism    Active Hospital Problems    Diagnosis Date Noted    Pneumonia [J18.9] 10/31/2018    Shortness of breath [R06.02]     Chest pain [R07.9]     Leukocytosis [D72.829]     History of pulmonary embolism [Z86.711] 10/24/2018    Pneumonia due to organism [J18.9] 10/24/2018    Tobacco abuse [Z72.0] 10/24/2018       Plan:        Continue Abx, switch to oral  ( Levaquin ) if ok with pulmonology  Continue breathing treatment  Hydration, decrease rate  Pain control.  Titrate down  F/u hematology recommendations   Monitor Hb and retic count, curently stable  Tx if <7  Not on Hydroxyurea  H/O DVT and PE not on long term AC  DVT and GI PPx    Will discharge when arrangements complete and ok with other services.   Follow-up with PCP in one week, Sergio Brunson MD  Notify PCP of discharge        Migel Hooks MD  11/2/2018  8:23 AM

## 2018-11-02 NOTE — CARE COORDINATION
Transitional Planning  PCP appointment rescheduled @ [facility]: November 8 @ 9 AM. Appointment added to AVS.

## 2018-11-03 VITALS
WEIGHT: 186.29 LBS | BODY MASS INDEX: 24.69 KG/M2 | DIASTOLIC BLOOD PRESSURE: 72 MMHG | HEART RATE: 83 BPM | HEIGHT: 73 IN | SYSTOLIC BLOOD PRESSURE: 131 MMHG | TEMPERATURE: 98.5 F | RESPIRATION RATE: 18 BRPM | OXYGEN SATURATION: 94 %

## 2018-11-03 LAB
ABSOLUTE EOS #: 0 K/UL (ref 0–0.4)
ABSOLUTE IMMATURE GRANULOCYTE: 0 K/UL (ref 0–0.3)
ABSOLUTE LYMPH #: 3.26 K/UL (ref 1–4.8)
ABSOLUTE MONO #: 0.47 K/UL (ref 0.1–0.8)
ABSOLUTE RETIC #: 0.26 M/UL (ref 0.03–0.08)
BASOPHILS # BLD: 1 % (ref 0–2)
BASOPHILS ABSOLUTE: 0.09 K/UL (ref 0–0.2)
DIFFERENTIAL TYPE: ABNORMAL
EOSINOPHILS RELATIVE PERCENT: 0 % (ref 1–4)
HCT VFR BLD CALC: 28.5 % (ref 40.7–50.3)
HEMOGLOBIN: 10.1 G/DL (ref 13–17)
IMMATURE GRANULOCYTES: 0 %
IMMATURE RETIC FRACT: 35.2 % (ref 2.7–18.3)
LYMPHOCYTES # BLD: 35 % (ref 24–44)
MCH RBC QN AUTO: 30 PG (ref 25.2–33.5)
MCHC RBC AUTO-ENTMCNC: 35.4 G/DL (ref 28.4–34.8)
MCV RBC AUTO: 84.6 FL (ref 82.6–102.9)
MONOCYTES # BLD: 5 % (ref 1–7)
MORPHOLOGY: ABNORMAL
NRBC AUTOMATED: 0.9 PER 100 WBC
NUCLEATED RED BLOOD CELLS: 2 PER 100 WBC
PDW BLD-RTO: 16.5 % (ref 11.8–14.4)
PLATELET # BLD: 358 K/UL (ref 138–453)
PLATELET ESTIMATE: ABNORMAL
PMV BLD AUTO: 11.2 FL (ref 8.1–13.5)
RBC # BLD: 3.37 M/UL (ref 4.21–5.77)
RBC # BLD: ABNORMAL 10*6/UL
RETIC %: 7.6 % (ref 0.5–1.9)
RETIC HEMOGLOBIN: 32.3 PG (ref 28.2–35.7)
SEG NEUTROPHILS: 59 % (ref 36–66)
SEGMENTED NEUTROPHILS ABSOLUTE COUNT: 5.48 K/UL (ref 1.8–7.7)
WBC # BLD: 9.3 K/UL (ref 3.5–11.3)
WBC # BLD: ABNORMAL 10*3/UL

## 2018-11-03 PROCEDURE — 6360000002 HC RX W HCPCS: Performed by: NURSE PRACTITIONER

## 2018-11-03 PROCEDURE — 6370000000 HC RX 637 (ALT 250 FOR IP): Performed by: FAMILY MEDICINE

## 2018-11-03 PROCEDURE — 99238 HOSP IP/OBS DSCHRG MGMT 30/<: CPT | Performed by: FAMILY MEDICINE

## 2018-11-03 PROCEDURE — 85045 AUTOMATED RETICULOCYTE COUNT: CPT

## 2018-11-03 PROCEDURE — 36415 COLL VENOUS BLD VENIPUNCTURE: CPT

## 2018-11-03 PROCEDURE — 85025 COMPLETE CBC W/AUTO DIFF WBC: CPT

## 2018-11-03 PROCEDURE — 6370000000 HC RX 637 (ALT 250 FOR IP): Performed by: NURSE PRACTITIONER

## 2018-11-03 RX ORDER — LEVOFLOXACIN 750 MG/1
750 TABLET ORAL DAILY
Qty: 7 TABLET | Refills: 0 | Status: SHIPPED | OUTPATIENT
Start: 2018-11-04 | End: 2018-11-11

## 2018-11-03 RX ORDER — OXYCODONE HYDROCHLORIDE AND ACETAMINOPHEN 5; 325 MG/1; MG/1
1 TABLET ORAL EVERY 8 HOURS PRN
Qty: 6 TABLET | Refills: 0 | Status: SHIPPED | OUTPATIENT
Start: 2018-11-03 | End: 2018-11-05

## 2018-11-03 RX ADMIN — LEVOFLOXACIN 750 MG: 750 TABLET, FILM COATED ORAL at 09:16

## 2018-11-03 RX ADMIN — ENOXAPARIN SODIUM 40 MG: 40 INJECTION SUBCUTANEOUS at 09:16

## 2018-11-03 RX ADMIN — OXYCODONE HYDROCHLORIDE AND ACETAMINOPHEN 2 TABLET: 5; 325 TABLET ORAL at 00:30

## 2018-11-03 RX ADMIN — OXYCODONE HYDROCHLORIDE AND ACETAMINOPHEN 2 TABLET: 5; 325 TABLET ORAL at 09:16

## 2018-11-03 RX ADMIN — DOCUSATE SODIUM 100 MG: 100 CAPSULE, LIQUID FILLED ORAL at 09:16

## 2018-11-03 ASSESSMENT — PAIN SCALES - GENERAL
PAINLEVEL_OUTOF10: 3
PAINLEVEL_OUTOF10: 9
PAINLEVEL_OUTOF10: 6

## 2018-11-03 NOTE — PROGRESS NOTES
Hb and retic count, curently stable  Tx if <7  Not on Hydroxyurea  H/O DVT and PE not on long term AC  Needs to f/u with hematology as an OP   ( hematologist discharged him?)  DVT and GI PPx    Will discharge when arrangements complete and ok with other services.   Follow-up with PCP in one week, Charo Saravia MD  Notify PCP of discharge        Gretta Ochoa MD  11/3/2018  10:35 AM

## 2018-11-04 NOTE — DISCHARGE SUMMARY
[facility] 19    Discharge Summary     Patient ID: Gloria Toth  :  1995   MRN: [id number]     ACCOUNT:  [de-identified]   Patient's PCP: Carmencita Flores MD  Admit Date: 10/30/2018   Discharge Date: 11/3/2018     Length of Stay: 3  Code Status:  Prior  Admitting Physician: Jaime Roman MD  Discharge Physician: Jaime Roman MD     Active Discharge Diagnoses:     Hospital Problem Lists:  Principal Problem:    Pneumonia due to organism  Active Problems:    Tobacco abuse    History of pulmonary embolism    Pneumonia    Shortness of breath    Chest pain    Leukocytosis  Resolved Problems:    * No resolved hospital problems. *      Admission Condition:  poor     Discharged Condition: good    Hospital Stay:     Hospital Course: The patient is a 21 y. o.  Non-/non  male who presents with Shortness of Breath   and he is admitted to the hospital for the management of  PNA. Patient with known sickle cell disease presented to ER with worsening R chest wall pain and cough, he was recently discharged from the hospital where he was treated for similar condition. In ER CXR showed worsening infiltrate and some leukocytosis and elevated bilirubin. Patient noted to be tachypnic as well. Patient was admitted for further management     During the admission patient was managed as follow    Pulmonology were consulted for worsening PNA    ContinueLevaquin  750 X 7 days, total 7 days  Continue breathing treatment  Hydrated  Pain control. Titrate down  F/u hematology recommendations   Monitor Hb and retic count, curently stable  Tx if <7  Not on Hydroxyurea  H/O DVT and PE not on long term AC  Needs to f/u with hematology as an OP   ( hematologist discharged him?)  DVT and GI PPx     Will discharge when arrangements complete and ok with other services.   Follow-up with PCP in one week, Carmencita Flores MD  Notify PCP of discharge       Significant modulation, iterative reconstruction, and/or weight based adjustment of the mA/kV was utilized to reduce the radiation dose to as low as reasonably achievable. COMPARISON: Radiographs from October 31, 2018 and October 27, 2018 and CTA chest from 10/24/2018 HISTORY: ORDERING SYSTEM PROVIDED HISTORY: PNEUMONIA, UNRESOLVED FINDINGS: Mediastinum: The heart is normal in size. No pericardial effusion. The aorta and great vessels appear unremarkable. The pulmonary artery is normal in caliber. Prominent paratracheal lymph nodes likely reactive in nature due to the presence of acute airspace disease in the right lower lobe. The posterior mediastinal structures show no acute findings. Lungs/pleura: Airspace consolidation with associated ground-glass opacities and air bronchograms concentrated in the right lower lobe most compatible with pneumonia. A few ground-glass opacities are also noted in the right middle lobe compatible with pneumonia. Left lung is clear. These airspace opacities have worsened since prior CTA chest.  Left basilar opacity is felt to represent atelectasis. No effusion or pneumothorax. Upper Abdomen: Limited imaging of the upper abdomen without contrast demonstrates no acute findings. Soft Tissues/Bones: No acute osseous abnormality. No focal soft tissue abnormality in the superficial soft tissues. No axillary lymphadenopathy. 1. Progressing right lower lobe and now right middle lobe airspace disease since prior CTA chest, but similar in appearance to most recent chest radiograph. Findings compatible with pneumonia. Follow-up imaging after appropriate therapy is advised to exclude neoplastic process but this is felt to be unlikely in a patient of this age. 2. Reactive adenopathy in the mediastinum.      Ct Chest Pulmonary Embolism W Contrast    Result Date: 10/25/2018  EXAMINATION: CTA OF THE CHEST 10/24/2018 2:05 am TECHNIQUE: CTA of the chest was performed after the administration of HOSPITALIZATION/Incidental Findings:     Diet: regular diet    Activity: As tolerated    Instructions to Patient:     Discharge Medications:      Medication List      START taking these medications    levofloxacin 750 MG tablet  Commonly known as:  LEVAQUIN  Take 1 tablet by mouth daily for 7 days  Start taking on:  11/4/2018     oxyCODONE-acetaminophen 5-325 MG per tablet  Commonly known as:  PERCOCET  Take 1 tablet by mouth every 8 hours as needed for Pain for up to 2 days. Burnice Sinning STOP taking these medications    azithromycin 250 MG tablet  Commonly known as:  ZITHROMAX     cefUROXime 500 MG tablet  Commonly known as:  CEFTIN     dextromethorphan-guaiFENesin  MG/5ML syrup  Commonly known as:  ROBITUSSIN-DM     nicotine polacrilex 2 MG gum  Commonly known as:  NICORETTE           Where to Get Your Medications      These medications were sent to [address]  [facility], [address] [address]    Phone:  [phone]   · levofloxacin 750 MG tablet     You can get these medications from any pharmacy    Bring a paper prescription for each of these medications  · oxyCODONE-acetaminophen 5-325 MG per tablet         Time Spent on discharge is  20 minutes in patient examination, evaluation, counseling as well as medication reconciliation, prescriptions for required medications, discharge plan and follow up. Electronically signed by   Tommy Browne MD  11/3/2018  9:50 PM      Thank you Dr. Rhonda Samayoa MD for the opportunity to be involved in this patient's care.

## 2019-03-10 ENCOUNTER — HOSPITAL ENCOUNTER (INPATIENT)
Age: 24
LOS: 3 days | Discharge: HOME | DRG: 662 | End: 2019-03-13
Attending: EMERGENCY MEDICINE | Admitting: EMERGENCY MEDICINE
Payer: MEDICAID

## 2019-03-10 DIAGNOSIS — M79.601 RIGHT ARM PAIN: ICD-10-CM

## 2019-03-10 DIAGNOSIS — D57.00 SICKLE CELL CRISIS (HCC): Primary | ICD-10-CM

## 2019-03-10 LAB
ABSOLUTE EOS #: 0 K/UL (ref 0–0.4)
ABSOLUTE IMMATURE GRANULOCYTE: 0 K/UL (ref 0–0.3)
ABSOLUTE LYMPH #: 3.2 K/UL (ref 1–4.8)
ABSOLUTE MONO #: 0.9 K/UL (ref 0.1–0.8)
ABSOLUTE RETIC #: 0.24 M/UL (ref 0.03–0.08)
ALBUMIN SERPL-MCNC: 4.7 G/DL (ref 3.5–5.2)
ALBUMIN/GLOBULIN RATIO: 1.6 (ref 1–2.5)
ALP BLD-CCNC: 75 U/L (ref 40–129)
ALT SERPL-CCNC: 13 U/L (ref 5–41)
ANION GAP SERPL CALCULATED.3IONS-SCNC: 13 MMOL/L (ref 9–17)
AST SERPL-CCNC: 35 U/L
BASOPHILS # BLD: 0 % (ref 0–2)
BASOPHILS ABSOLUTE: 0 K/UL (ref 0–0.2)
BILIRUB SERPL-MCNC: 3.73 MG/DL (ref 0.3–1.2)
BUN BLDV-MCNC: 5 MG/DL (ref 6–20)
BUN/CREAT BLD: ABNORMAL (ref 9–20)
CALCIUM SERPL-MCNC: 9 MG/DL (ref 8.6–10.4)
CHLORIDE BLD-SCNC: 103 MMOL/L (ref 98–107)
CO2: 22 MMOL/L (ref 20–31)
CREAT SERPL-MCNC: 0.51 MG/DL (ref 0.7–1.2)
DIFFERENTIAL TYPE: ABNORMAL
EOSINOPHILS RELATIVE PERCENT: 0 % (ref 1–4)
GFR AFRICAN AMERICAN: >60 ML/MIN
GFR NON-AFRICAN AMERICAN: >60 ML/MIN
GFR SERPL CREATININE-BSD FRML MDRD: ABNORMAL ML/MIN/{1.73_M2}
GFR SERPL CREATININE-BSD FRML MDRD: ABNORMAL ML/MIN/{1.73_M2}
GLUCOSE BLD-MCNC: 142 MG/DL (ref 70–99)
HCT VFR BLD CALC: 33.6 % (ref 40.7–50.3)
HEMOGLOBIN: 12.6 G/DL (ref 13–17)
IMMATURE GRANULOCYTES: 0 %
IMMATURE RETIC FRACT: 31.7 % (ref 2.7–18.3)
LYMPHOCYTES # BLD: 32 % (ref 24–44)
MCH RBC QN AUTO: 31.8 PG (ref 25.2–33.5)
MCHC RBC AUTO-ENTMCNC: 37.5 G/DL (ref 28.4–34.8)
MCV RBC AUTO: 84.8 FL (ref 82.6–102.9)
MONOCYTES # BLD: 9 % (ref 1–7)
MORPHOLOGY: ABNORMAL
NRBC AUTOMATED: 2 PER 100 WBC
NUCLEATED RED BLOOD CELLS: 2 PER 100 WBC
PDW BLD-RTO: 14.7 % (ref 11.8–14.4)
PLATELET # BLD: 188 K/UL (ref 138–453)
PLATELET ESTIMATE: ABNORMAL
PMV BLD AUTO: 12.4 FL (ref 8.1–13.5)
POTASSIUM SERPL-SCNC: 4.1 MMOL/L (ref 3.7–5.3)
RBC # BLD: 4.19 M/UL (ref 4.21–5.77)
RBC # BLD: ABNORMAL 10*6/UL
RETIC %: 5.8 % (ref 0.5–1.9)
RETIC HEMOGLOBIN: 34.3 PG (ref 28.2–35.7)
SEG NEUTROPHILS: 59 % (ref 36–66)
SEGMENTED NEUTROPHILS ABSOLUTE COUNT: 5.9 K/UL (ref 1.8–7.7)
SODIUM BLD-SCNC: 138 MMOL/L (ref 135–144)
TOTAL PROTEIN: 7.6 G/DL (ref 6.4–8.3)
WBC # BLD: 10 K/UL (ref 3.5–11.3)
WBC # BLD: ABNORMAL 10*3/UL

## 2019-03-10 PROCEDURE — 6370000000 HC RX 637 (ALT 250 FOR IP): Performed by: STUDENT IN AN ORGANIZED HEALTH CARE EDUCATION/TRAINING PROGRAM

## 2019-03-10 PROCEDURE — 6360000002 HC RX W HCPCS: Performed by: STUDENT IN AN ORGANIZED HEALTH CARE EDUCATION/TRAINING PROGRAM

## 2019-03-10 PROCEDURE — 2580000003 HC RX 258: Performed by: STUDENT IN AN ORGANIZED HEALTH CARE EDUCATION/TRAINING PROGRAM

## 2019-03-10 PROCEDURE — 94770 HC ETCO2 MONITOR DAILY: CPT

## 2019-03-10 PROCEDURE — 94760 N-INVAS EAR/PLS OXIMETRY 1: CPT

## 2019-03-10 PROCEDURE — 80053 COMPREHEN METABOLIC PANEL: CPT

## 2019-03-10 PROCEDURE — 99284 EMERGENCY DEPT VISIT MOD MDM: CPT

## 2019-03-10 PROCEDURE — 96376 TX/PRO/DX INJ SAME DRUG ADON: CPT

## 2019-03-10 PROCEDURE — 85045 AUTOMATED RETICULOCYTE COUNT: CPT

## 2019-03-10 PROCEDURE — 96375 TX/PRO/DX INJ NEW DRUG ADDON: CPT

## 2019-03-10 PROCEDURE — 85025 COMPLETE CBC W/AUTO DIFF WBC: CPT

## 2019-03-10 PROCEDURE — 1200000000 HC SEMI PRIVATE

## 2019-03-10 PROCEDURE — 96374 THER/PROPH/DIAG INJ IV PUSH: CPT

## 2019-03-10 RX ORDER — DIPHENHYDRAMINE HYDROCHLORIDE 50 MG/ML
25 INJECTION INTRAMUSCULAR; INTRAVENOUS ONCE
Status: COMPLETED | OUTPATIENT
Start: 2019-03-10 | End: 2019-03-10

## 2019-03-10 RX ORDER — NALOXONE HYDROCHLORIDE 0.4 MG/ML
0.4 INJECTION, SOLUTION INTRAMUSCULAR; INTRAVENOUS; SUBCUTANEOUS PRN
Status: DISCONTINUED | OUTPATIENT
Start: 2019-03-10 | End: 2019-03-10

## 2019-03-10 RX ORDER — SODIUM CHLORIDE 0.9 % (FLUSH) 0.9 %
10 SYRINGE (ML) INJECTION EVERY 12 HOURS SCHEDULED
Status: DISCONTINUED | OUTPATIENT
Start: 2019-03-10 | End: 2019-03-13 | Stop reason: HOSPADM

## 2019-03-10 RX ORDER — OXYCODONE HYDROCHLORIDE AND ACETAMINOPHEN 5; 325 MG/1; MG/1
2 TABLET ORAL EVERY 4 HOURS PRN
Status: DISCONTINUED | OUTPATIENT
Start: 2019-03-10 | End: 2019-03-13 | Stop reason: HOSPADM

## 2019-03-10 RX ORDER — 0.9 % SODIUM CHLORIDE 0.9 %
1000 INTRAVENOUS SOLUTION INTRAVENOUS ONCE
Status: COMPLETED | OUTPATIENT
Start: 2019-03-10 | End: 2019-03-10

## 2019-03-10 RX ORDER — SODIUM CHLORIDE 0.9 % (FLUSH) 0.9 %
10 SYRINGE (ML) INJECTION PRN
Status: DISCONTINUED | OUTPATIENT
Start: 2019-03-10 | End: 2019-03-13 | Stop reason: HOSPADM

## 2019-03-10 RX ORDER — PROMETHAZINE HYDROCHLORIDE 25 MG/ML
12.5 INJECTION, SOLUTION INTRAMUSCULAR; INTRAVENOUS ONCE
Status: COMPLETED | OUTPATIENT
Start: 2019-03-10 | End: 2019-03-10

## 2019-03-10 RX ORDER — ACETAMINOPHEN 325 MG/1
650 TABLET ORAL EVERY 4 HOURS PRN
Status: DISCONTINUED | OUTPATIENT
Start: 2019-03-10 | End: 2019-03-13 | Stop reason: HOSPADM

## 2019-03-10 RX ADMIN — HYDROMORPHONE HYDROCHLORIDE 1 MG: 1 INJECTION, SOLUTION INTRAMUSCULAR; INTRAVENOUS; SUBCUTANEOUS at 09:01

## 2019-03-10 RX ADMIN — Medication: at 13:06

## 2019-03-10 RX ADMIN — OXYCODONE HYDROCHLORIDE AND ACETAMINOPHEN 2 TABLET: 5; 325 TABLET ORAL at 19:48

## 2019-03-10 RX ADMIN — Medication 10 ML: at 21:00

## 2019-03-10 RX ADMIN — PROMETHAZINE HYDROCHLORIDE 12.5 MG: 25 INJECTION INTRAMUSCULAR; INTRAVENOUS at 10:47

## 2019-03-10 RX ADMIN — OXYCODONE HYDROCHLORIDE AND ACETAMINOPHEN 2 TABLET: 5; 325 TABLET ORAL at 23:51

## 2019-03-10 RX ADMIN — SODIUM CHLORIDE 1000 ML: 9 INJECTION, SOLUTION INTRAVENOUS at 09:01

## 2019-03-10 RX ADMIN — DIPHENHYDRAMINE HYDROCHLORIDE 25 MG: 50 INJECTION, SOLUTION INTRAMUSCULAR; INTRAVENOUS at 10:46

## 2019-03-10 RX ADMIN — HYDROMORPHONE HYDROCHLORIDE 2 MG: 1 INJECTION, SOLUTION INTRAMUSCULAR; INTRAVENOUS; SUBCUTANEOUS at 10:47

## 2019-03-10 RX ADMIN — OXYCODONE HYDROCHLORIDE AND ACETAMINOPHEN 2 TABLET: 5; 325 TABLET ORAL at 15:34

## 2019-03-10 ASSESSMENT — ENCOUNTER SYMPTOMS
VOMITING: 0
COUGH: 0
WHEEZING: 0
SHORTNESS OF BREATH: 0
STRIDOR: 0
ABDOMINAL PAIN: 0
NAUSEA: 0

## 2019-03-10 ASSESSMENT — PAIN DESCRIPTION - LOCATION
LOCATION: BACK;SHOULDER
LOCATION: ARM

## 2019-03-10 ASSESSMENT — PAIN SCALES - GENERAL
PAINLEVEL_OUTOF10: 10
PAINLEVEL_OUTOF10: 9
PAINLEVEL_OUTOF10: 10
PAINLEVEL_OUTOF10: 10
PAINLEVEL_OUTOF10: 9
PAINLEVEL_OUTOF10: 10

## 2019-03-11 ENCOUNTER — APPOINTMENT (OUTPATIENT)
Dept: GENERAL RADIOLOGY | Age: 24
DRG: 662 | End: 2019-03-11
Payer: MEDICAID

## 2019-03-11 LAB
ABSOLUTE EOS #: 0 K/UL (ref 0–0.4)
ABSOLUTE IMMATURE GRANULOCYTE: 0 K/UL (ref 0–0.3)
ABSOLUTE LYMPH #: 5.02 K/UL (ref 1–4.8)
ABSOLUTE MONO #: 0.57 K/UL (ref 0.1–0.8)
ABSOLUTE RETIC #: 0.29 M/UL (ref 0.03–0.08)
ATYPICAL LYMPHOCYTE ABSOLUTE COUNT: 0.46 K/UL
ATYPICAL LYMPHOCYTES: 4 %
BASOPHILS # BLD: 0 % (ref 0–2)
BASOPHILS ABSOLUTE: 0 K/UL (ref 0–0.2)
DIFFERENTIAL TYPE: ABNORMAL
EOSINOPHILS RELATIVE PERCENT: 0 % (ref 1–4)
HCT VFR BLD CALC: 34.7 % (ref 40.7–50.3)
HEMOGLOBIN: 13 G/DL (ref 13–17)
IMMATURE GRANULOCYTES: 0 %
IMMATURE RETIC FRACT: 33.1 % (ref 2.7–18.3)
LACTATE DEHYDROGENASE: 393 U/L (ref 135–225)
LYMPHOCYTES # BLD: 44 % (ref 24–44)
MCH RBC QN AUTO: 31.7 PG (ref 25.2–33.5)
MCHC RBC AUTO-ENTMCNC: 37.5 G/DL (ref 28.4–34.8)
MCV RBC AUTO: 84.6 FL (ref 82.6–102.9)
MONOCYTES # BLD: 5 % (ref 1–7)
MORPHOLOGY: ABNORMAL
NRBC AUTOMATED: 1.9 PER 100 WBC
NUCLEATED RED BLOOD CELLS: 5 PER 100 WBC
PDW BLD-RTO: 15.1 % (ref 11.8–14.4)
PLATELET # BLD: ABNORMAL K/UL (ref 138–453)
PLATELET ESTIMATE: ABNORMAL
PLATELET, FLUORESCENCE: 162 K/UL (ref 138–453)
PLATELET, IMMATURE FRACTION: 12 % (ref 1.1–10.3)
PMV BLD AUTO: ABNORMAL FL (ref 8.1–13.5)
RBC # BLD: 4.1 M/UL (ref 4.21–5.77)
RBC # BLD: ABNORMAL 10*6/UL
RETIC %: 6.4 % (ref 0.5–1.9)
RETIC HEMOGLOBIN: 34.5 PG (ref 28.2–35.7)
SEG NEUTROPHILS: 47 % (ref 36–66)
SEGMENTED NEUTROPHILS ABSOLUTE COUNT: 5.35 K/UL (ref 1.8–7.7)
WBC # BLD: 11.4 K/UL (ref 3.5–11.3)
WBC # BLD: ABNORMAL 10*3/UL

## 2019-03-11 PROCEDURE — 73030 X-RAY EXAM OF SHOULDER: CPT

## 2019-03-11 PROCEDURE — 94762 N-INVAS EAR/PLS OXIMTRY CONT: CPT

## 2019-03-11 PROCEDURE — 6360000002 HC RX W HCPCS: Performed by: STUDENT IN AN ORGANIZED HEALTH CARE EDUCATION/TRAINING PROGRAM

## 2019-03-11 PROCEDURE — 94770 HC ETCO2 MONITOR DAILY: CPT

## 2019-03-11 PROCEDURE — 1200000000 HC SEMI PRIVATE

## 2019-03-11 PROCEDURE — 36415 COLL VENOUS BLD VENIPUNCTURE: CPT

## 2019-03-11 PROCEDURE — 85045 AUTOMATED RETICULOCYTE COUNT: CPT

## 2019-03-11 PROCEDURE — 2580000003 HC RX 258: Performed by: STUDENT IN AN ORGANIZED HEALTH CARE EDUCATION/TRAINING PROGRAM

## 2019-03-11 PROCEDURE — 6370000000 HC RX 637 (ALT 250 FOR IP): Performed by: STUDENT IN AN ORGANIZED HEALTH CARE EDUCATION/TRAINING PROGRAM

## 2019-03-11 PROCEDURE — 83615 LACTATE (LD) (LDH) ENZYME: CPT

## 2019-03-11 PROCEDURE — 99255 IP/OBS CONSLTJ NEW/EST HI 80: CPT | Performed by: INTERNAL MEDICINE

## 2019-03-11 PROCEDURE — 85055 RETICULATED PLATELET ASSAY: CPT

## 2019-03-11 PROCEDURE — 85025 COMPLETE CBC W/AUTO DIFF WBC: CPT

## 2019-03-11 RX ORDER — NALOXONE HYDROCHLORIDE 0.4 MG/ML
0.4 INJECTION, SOLUTION INTRAMUSCULAR; INTRAVENOUS; SUBCUTANEOUS PRN
Status: DISCONTINUED | OUTPATIENT
Start: 2019-03-11 | End: 2019-03-11

## 2019-03-11 RX ADMIN — ENOXAPARIN SODIUM 40 MG: 40 INJECTION SUBCUTANEOUS at 08:38

## 2019-03-11 RX ADMIN — OXYCODONE HYDROCHLORIDE AND ACETAMINOPHEN 2 TABLET: 5; 325 TABLET ORAL at 04:43

## 2019-03-11 RX ADMIN — OXYCODONE HYDROCHLORIDE AND ACETAMINOPHEN 2 TABLET: 5; 325 TABLET ORAL at 22:38

## 2019-03-11 RX ADMIN — Medication 12000 MCG: at 08:47

## 2019-03-11 RX ADMIN — Medication 10 ML: at 21:17

## 2019-03-11 ASSESSMENT — PAIN DESCRIPTION - ORIENTATION: ORIENTATION: RIGHT

## 2019-03-11 ASSESSMENT — PAIN DESCRIPTION - PROGRESSION: CLINICAL_PROGRESSION: GRADUALLY WORSENING

## 2019-03-11 ASSESSMENT — PAIN DESCRIPTION - PAIN TYPE: TYPE: ACUTE PAIN

## 2019-03-11 ASSESSMENT — PAIN SCALES - GENERAL
PAINLEVEL_OUTOF10: 7
PAINLEVEL_OUTOF10: 6
PAINLEVEL_OUTOF10: 10
PAINLEVEL_OUTOF10: 6
PAINLEVEL_OUTOF10: 9

## 2019-03-11 ASSESSMENT — PAIN DESCRIPTION - DESCRIPTORS: DESCRIPTORS: PRESSURE;SQUEEZING;THROBBING

## 2019-03-11 ASSESSMENT — PAIN DESCRIPTION - ONSET: ONSET: ON-GOING

## 2019-03-11 ASSESSMENT — PAIN DESCRIPTION - FREQUENCY: FREQUENCY: CONTINUOUS

## 2019-03-11 ASSESSMENT — PAIN DESCRIPTION - LOCATION: LOCATION: SHOULDER

## 2019-03-12 LAB
ABSOLUTE EOS #: 0 K/UL (ref 0–0.4)
ABSOLUTE IMMATURE GRANULOCYTE: 0 K/UL (ref 0–0.3)
ABSOLUTE LYMPH #: 3.16 K/UL (ref 1–4.8)
ABSOLUTE MONO #: 0.1 K/UL (ref 0.1–0.8)
ABSOLUTE RETIC #: 0.29 M/UL (ref 0.03–0.08)
BASOPHILS # BLD: 1 % (ref 0–2)
BASOPHILS ABSOLUTE: 0.1 K/UL (ref 0–0.2)
DIFFERENTIAL TYPE: ABNORMAL
EOSINOPHILS RELATIVE PERCENT: 0 % (ref 1–4)
HCT VFR BLD CALC: 36.2 % (ref 40.7–50.3)
HEMOGLOBIN: 13.5 G/DL (ref 13–17)
IMMATURE GRANULOCYTES: 0 %
IMMATURE RETIC FRACT: 28.3 % (ref 2.7–18.3)
LACTATE DEHYDROGENASE: 428 U/L (ref 135–225)
LYMPHOCYTES # BLD: 31 % (ref 24–44)
MCH RBC QN AUTO: 31.7 PG (ref 25.2–33.5)
MCHC RBC AUTO-ENTMCNC: 37.3 G/DL (ref 28.4–34.8)
MCV RBC AUTO: 85 FL (ref 82.6–102.9)
MONOCYTES # BLD: 1 % (ref 1–7)
MORPHOLOGY: ABNORMAL
NRBC AUTOMATED: 1.9 PER 100 WBC
NUCLEATED RED BLOOD CELLS: 3 PER 100 WBC
PDW BLD-RTO: 15.4 % (ref 11.8–14.4)
PLATELET # BLD: 175 K/UL (ref 138–453)
PLATELET ESTIMATE: ABNORMAL
PMV BLD AUTO: 12.7 FL (ref 8.1–13.5)
RBC # BLD: 4.26 M/UL (ref 4.21–5.77)
RBC # BLD: ABNORMAL 10*6/UL
RETIC %: 7.7 % (ref 0.5–1.9)
RETIC HEMOGLOBIN: 33 PG (ref 28.2–35.7)
SEG NEUTROPHILS: 67 % (ref 36–66)
SEGMENTED NEUTROPHILS ABSOLUTE COUNT: 6.84 K/UL (ref 1.8–7.7)
WBC # BLD: 10.2 K/UL (ref 3.5–11.3)
WBC # BLD: ABNORMAL 10*3/UL

## 2019-03-12 PROCEDURE — 99232 SBSQ HOSP IP/OBS MODERATE 35: CPT | Performed by: INTERNAL MEDICINE

## 2019-03-12 PROCEDURE — 83615 LACTATE (LD) (LDH) ENZYME: CPT

## 2019-03-12 PROCEDURE — 2700000000 HC OXYGEN THERAPY PER DAY

## 2019-03-12 PROCEDURE — 93971 EXTREMITY STUDY: CPT

## 2019-03-12 PROCEDURE — 94760 N-INVAS EAR/PLS OXIMETRY 1: CPT

## 2019-03-12 PROCEDURE — 76937 US GUIDE VASCULAR ACCESS: CPT

## 2019-03-12 PROCEDURE — 2580000003 HC RX 258: Performed by: STUDENT IN AN ORGANIZED HEALTH CARE EDUCATION/TRAINING PROGRAM

## 2019-03-12 PROCEDURE — 6360000002 HC RX W HCPCS: Performed by: STUDENT IN AN ORGANIZED HEALTH CARE EDUCATION/TRAINING PROGRAM

## 2019-03-12 PROCEDURE — 85045 AUTOMATED RETICULOCYTE COUNT: CPT

## 2019-03-12 PROCEDURE — 85025 COMPLETE CBC W/AUTO DIFF WBC: CPT

## 2019-03-12 PROCEDURE — 36415 COLL VENOUS BLD VENIPUNCTURE: CPT

## 2019-03-12 PROCEDURE — 6370000000 HC RX 637 (ALT 250 FOR IP): Performed by: STUDENT IN AN ORGANIZED HEALTH CARE EDUCATION/TRAINING PROGRAM

## 2019-03-12 PROCEDURE — 94770 HC ETCO2 MONITOR DAILY: CPT

## 2019-03-12 PROCEDURE — 1200000000 HC SEMI PRIVATE

## 2019-03-12 RX ORDER — NALOXONE HYDROCHLORIDE 0.4 MG/ML
0.4 INJECTION, SOLUTION INTRAMUSCULAR; INTRAVENOUS; SUBCUTANEOUS PRN
Status: DISCONTINUED | OUTPATIENT
Start: 2019-03-12 | End: 2019-03-13 | Stop reason: HOSPADM

## 2019-03-12 RX ADMIN — OXYCODONE HYDROCHLORIDE AND ACETAMINOPHEN 2 TABLET: 5; 325 TABLET ORAL at 15:07

## 2019-03-12 RX ADMIN — OXYCODONE HYDROCHLORIDE AND ACETAMINOPHEN 2 TABLET: 5; 325 TABLET ORAL at 23:08

## 2019-03-12 RX ADMIN — OXYCODONE HYDROCHLORIDE AND ACETAMINOPHEN 2 TABLET: 5; 325 TABLET ORAL at 19:08

## 2019-03-12 RX ADMIN — OXYCODONE HYDROCHLORIDE AND ACETAMINOPHEN 2 TABLET: 5; 325 TABLET ORAL at 11:05

## 2019-03-12 RX ADMIN — OXYCODONE HYDROCHLORIDE AND ACETAMINOPHEN 2 TABLET: 5; 325 TABLET ORAL at 04:17

## 2019-03-12 RX ADMIN — Medication 12000 MCG: at 13:07

## 2019-03-12 RX ADMIN — Medication 10 ML: at 11:06

## 2019-03-12 ASSESSMENT — PAIN SCALES - GENERAL
PAINLEVEL_OUTOF10: 10

## 2019-03-13 VITALS
HEIGHT: 73 IN | HEART RATE: 59 BPM | DIASTOLIC BLOOD PRESSURE: 79 MMHG | BODY MASS INDEX: 26.14 KG/M2 | WEIGHT: 197.2 LBS | TEMPERATURE: 99.4 F | OXYGEN SATURATION: 95 % | RESPIRATION RATE: 16 BRPM | SYSTOLIC BLOOD PRESSURE: 129 MMHG

## 2019-03-13 LAB
ABSOLUTE EOS #: 0 K/UL (ref 0–0.4)
ABSOLUTE IMMATURE GRANULOCYTE: 0 K/UL (ref 0–0.3)
ABSOLUTE LYMPH #: 1.5 K/UL (ref 1–4.8)
ABSOLUTE MONO #: 0.88 K/UL (ref 0.1–0.8)
ABSOLUTE RETIC #: 0.27 M/UL (ref 0.03–0.08)
BASOPHILS # BLD: 0 % (ref 0–2)
BASOPHILS ABSOLUTE: 0 K/UL (ref 0–0.2)
DIFFERENTIAL TYPE: ABNORMAL
EOSINOPHILS RELATIVE PERCENT: 0 % (ref 1–4)
HCT VFR BLD CALC: 34 % (ref 40.7–50.3)
HEMOGLOBIN: 12.7 G/DL (ref 13–17)
IMMATURE GRANULOCYTES: 0 %
IMMATURE RETIC FRACT: 27.2 % (ref 2.7–18.3)
LACTATE DEHYDROGENASE: 372 U/L (ref 135–225)
LYMPHOCYTES # BLD: 12 % (ref 24–44)
MCH RBC QN AUTO: 31.7 PG (ref 25.2–33.5)
MCHC RBC AUTO-ENTMCNC: 37.4 G/DL (ref 28.4–34.8)
MCV RBC AUTO: 84.8 FL (ref 82.6–102.9)
MONOCYTES # BLD: 7 % (ref 1–7)
MORPHOLOGY: ABNORMAL
NRBC AUTOMATED: 1.2 PER 100 WBC
NUCLEATED RED BLOOD CELLS: 1 PER 100 WBC
PDW BLD-RTO: 15.1 % (ref 11.8–14.4)
PLATELET # BLD: ABNORMAL K/UL (ref 138–453)
PLATELET ESTIMATE: ABNORMAL
PLATELET, FLUORESCENCE: 174 K/UL (ref 138–453)
PLATELET, IMMATURE FRACTION: 13.1 % (ref 1.1–10.3)
PMV BLD AUTO: ABNORMAL FL (ref 8.1–13.5)
RBC # BLD: 4.01 M/UL (ref 4.21–5.77)
RBC # BLD: ABNORMAL 10*6/UL
RETIC %: 6.8 % (ref 0.5–1.9)
RETIC HEMOGLOBIN: 34 PG (ref 28.2–35.7)
SEG NEUTROPHILS: 81 % (ref 36–66)
SEGMENTED NEUTROPHILS ABSOLUTE COUNT: 10.12 K/UL (ref 1.8–7.7)
WBC # BLD: 12.5 K/UL (ref 3.5–11.3)
WBC # BLD: ABNORMAL 10*3/UL

## 2019-03-13 PROCEDURE — 36415 COLL VENOUS BLD VENIPUNCTURE: CPT

## 2019-03-13 PROCEDURE — 85025 COMPLETE CBC W/AUTO DIFF WBC: CPT

## 2019-03-13 PROCEDURE — 83615 LACTATE (LD) (LDH) ENZYME: CPT

## 2019-03-13 PROCEDURE — 6370000000 HC RX 637 (ALT 250 FOR IP): Performed by: STUDENT IN AN ORGANIZED HEALTH CARE EDUCATION/TRAINING PROGRAM

## 2019-03-13 PROCEDURE — 85055 RETICULATED PLATELET ASSAY: CPT

## 2019-03-13 PROCEDURE — 99232 SBSQ HOSP IP/OBS MODERATE 35: CPT | Performed by: INTERNAL MEDICINE

## 2019-03-13 PROCEDURE — 2580000003 HC RX 258: Performed by: STUDENT IN AN ORGANIZED HEALTH CARE EDUCATION/TRAINING PROGRAM

## 2019-03-13 PROCEDURE — 85045 AUTOMATED RETICULOCYTE COUNT: CPT

## 2019-03-13 PROCEDURE — 94770 HC ETCO2 MONITOR DAILY: CPT

## 2019-03-13 RX ORDER — NICOTINE 21 MG/24HR
1 PATCH, TRANSDERMAL 24 HOURS TRANSDERMAL DAILY
Status: DISCONTINUED | OUTPATIENT
Start: 2019-03-13 | End: 2019-03-13 | Stop reason: HOSPADM

## 2019-03-13 RX ORDER — OXYCODONE AND ACETAMINOPHEN 7.5; 325 MG/1; MG/1
1 TABLET ORAL EVERY 6 HOURS PRN
Qty: 12 TABLET | Refills: 0 | Status: SHIPPED | OUTPATIENT
Start: 2019-03-13 | End: 2019-03-16

## 2019-03-13 RX ORDER — NICOTINE 21 MG/24HR
1 PATCH, TRANSDERMAL 24 HOURS TRANSDERMAL DAILY
Status: DISCONTINUED | OUTPATIENT
Start: 2019-03-13 | End: 2019-03-13

## 2019-03-13 RX ADMIN — Medication 10 ML: at 10:05

## 2019-03-13 RX ADMIN — OXYCODONE HYDROCHLORIDE AND ACETAMINOPHEN 2 TABLET: 5; 325 TABLET ORAL at 08:42

## 2019-03-13 RX ADMIN — OXYCODONE HYDROCHLORIDE AND ACETAMINOPHEN 2 TABLET: 5; 325 TABLET ORAL at 12:34

## 2019-03-13 ASSESSMENT — PAIN SCALES - GENERAL
PAINLEVEL_OUTOF10: 7
PAINLEVEL_OUTOF10: 7

## 2019-03-13 ASSESSMENT — PAIN DESCRIPTION - PAIN TYPE: TYPE: ACUTE PAIN

## 2019-03-13 ASSESSMENT — PAIN DESCRIPTION - LOCATION: LOCATION: GENERALIZED

## 2019-03-13 ASSESSMENT — PAIN DESCRIPTION - DESCRIPTORS: DESCRIPTORS: PRESSURE;DISCOMFORT

## 2019-03-23 ENCOUNTER — HOSPITAL ENCOUNTER (INPATIENT)
Age: 24
LOS: 4 days | Discharge: HOME | DRG: 662 | End: 2019-03-28
Attending: EMERGENCY MEDICINE | Admitting: INTERNAL MEDICINE
Payer: MEDICAID

## 2019-03-23 DIAGNOSIS — D57.00 SICKLE CELL PAIN CRISIS (HCC): ICD-10-CM

## 2019-03-23 DIAGNOSIS — D57.01 ACUTE CHEST SYNDROME (HCC): Primary | ICD-10-CM

## 2019-03-23 PROCEDURE — 99285 EMERGENCY DEPT VISIT HI MDM: CPT

## 2019-03-23 ASSESSMENT — PAIN DESCRIPTION - PAIN TYPE: TYPE: ACUTE PAIN

## 2019-03-23 ASSESSMENT — PAIN DESCRIPTION - LOCATION: LOCATION: ARM;CHEST;BACK

## 2019-03-23 ASSESSMENT — PAIN SCALES - GENERAL: PAINLEVEL_OUTOF10: 9

## 2019-03-23 ASSESSMENT — PAIN DESCRIPTION - DESCRIPTORS: DESCRIPTORS: THROBBING;ACHING

## 2019-03-23 ASSESSMENT — PAIN DESCRIPTION - FREQUENCY: FREQUENCY: CONTINUOUS

## 2019-03-24 ENCOUNTER — APPOINTMENT (OUTPATIENT)
Dept: GENERAL RADIOLOGY | Age: 24
DRG: 662 | End: 2019-03-24
Payer: MEDICAID

## 2019-03-24 ENCOUNTER — APPOINTMENT (OUTPATIENT)
Dept: CT IMAGING | Age: 24
DRG: 662 | End: 2019-03-24
Payer: MEDICAID

## 2019-03-24 PROBLEM — D57.01 ACUTE CHEST SYNDROME DUE TO SICKLE CELL CRISIS (HCC): Status: ACTIVE | Noted: 2019-03-24

## 2019-03-24 LAB
ABSOLUTE EOS #: 0 K/UL (ref 0–0.4)
ABSOLUTE IMMATURE GRANULOCYTE: 0 K/UL (ref 0–0.3)
ABSOLUTE LYMPH #: 3.34 K/UL (ref 1–4.8)
ABSOLUTE MONO #: 1.16 K/UL (ref 0.1–0.8)
ABSOLUTE RETIC #: 0.22 M/UL (ref 0.03–0.08)
ADENOVIRUS PCR: NOT DETECTED
ANION GAP SERPL CALCULATED.3IONS-SCNC: 9 MMOL/L (ref 9–17)
BASOPHILS # BLD: 1 % (ref 0–2)
BASOPHILS ABSOLUTE: 0.15 K/UL (ref 0–0.2)
BORDETELLA PERTUSSIS PCR: NOT DETECTED
BUN BLDV-MCNC: 4 MG/DL (ref 6–20)
BUN/CREAT BLD: ABNORMAL (ref 9–20)
CALCIUM SERPL-MCNC: 8.8 MG/DL (ref 8.6–10.4)
CHLAMYDIA PNEUMONIAE BY PCR: NOT DETECTED
CHLORIDE BLD-SCNC: 102 MMOL/L (ref 98–107)
CO2: 24 MMOL/L (ref 20–31)
CORONAVIRUS 229E PCR: NOT DETECTED
CORONAVIRUS HKU1 PCR: NOT DETECTED
CORONAVIRUS NL63 PCR: NOT DETECTED
CORONAVIRUS OC43 PCR: NOT DETECTED
CREAT SERPL-MCNC: 0.63 MG/DL (ref 0.7–1.2)
DIFFERENTIAL TYPE: ABNORMAL
EOSINOPHILS RELATIVE PERCENT: 0 % (ref 1–4)
GFR AFRICAN AMERICAN: >60 ML/MIN
GFR NON-AFRICAN AMERICAN: >60 ML/MIN
GFR SERPL CREATININE-BSD FRML MDRD: ABNORMAL ML/MIN/{1.73_M2}
GFR SERPL CREATININE-BSD FRML MDRD: ABNORMAL ML/MIN/{1.73_M2}
GLUCOSE BLD-MCNC: 86 MG/DL (ref 70–99)
HCT VFR BLD CALC: 33.2 % (ref 40.7–50.3)
HEMOGLOBIN: 12.4 G/DL (ref 13–17)
HUMAN METAPNEUMOVIRUS PCR: NOT DETECTED
IMMATURE GRANULOCYTES: 0 %
IMMATURE RETIC FRACT: 29.4 % (ref 2.7–18.3)
INFLUENZA A BY PCR: NOT DETECTED
INFLUENZA A H1 (2009) PCR: NORMAL
INFLUENZA A H1 PCR: NORMAL
INFLUENZA A H3 PCR: NORMAL
INFLUENZA B BY PCR: NOT DETECTED
LACTATE DEHYDROGENASE: 328 U/L (ref 135–225)
LYMPHOCYTES # BLD: 23 % (ref 24–44)
MCH RBC QN AUTO: 31.7 PG (ref 25.2–33.5)
MCHC RBC AUTO-ENTMCNC: 37.3 G/DL (ref 28.4–34.8)
MCV RBC AUTO: 84.9 FL (ref 82.6–102.9)
MONOCYTES # BLD: 8 % (ref 1–7)
MORPHOLOGY: ABNORMAL
MYCOPLASMA PNEUMONIAE PCR: NOT DETECTED
MYOGLOBIN: 94 NG/ML (ref 28–72)
NRBC AUTOMATED: 0.9 PER 100 WBC
NUCLEATED RED BLOOD CELLS: 1 PER 100 WBC
PARAINFLUENZA 1 PCR: NOT DETECTED
PARAINFLUENZA 2 PCR: NOT DETECTED
PARAINFLUENZA 3 PCR: NOT DETECTED
PARAINFLUENZA 4 PCR: NOT DETECTED
PDW BLD-RTO: 14.6 % (ref 11.8–14.4)
PLATELET # BLD: 226 K/UL (ref 138–453)
PLATELET ESTIMATE: ABNORMAL
PMV BLD AUTO: 12.2 FL (ref 8.1–13.5)
POTASSIUM SERPL-SCNC: 3.7 MMOL/L (ref 3.7–5.3)
PROCALCITONIN: 0.08 NG/ML
RBC # BLD: 3.91 M/UL (ref 4.21–5.77)
RBC # BLD: ABNORMAL 10*6/UL
RESP SYNCYTIAL VIRUS PCR: NOT DETECTED
RETIC %: 5.7 % (ref 0.5–1.9)
RETIC HEMOGLOBIN: 33.4 PG (ref 28.2–35.7)
RHINO/ENTEROVIRUS PCR: NOT DETECTED
SEG NEUTROPHILS: 68 % (ref 36–66)
SEGMENTED NEUTROPHILS ABSOLUTE COUNT: 9.85 K/UL (ref 1.8–7.7)
SODIUM BLD-SCNC: 135 MMOL/L (ref 135–144)
SPECIMEN DESCRIPTION: NORMAL
TROPONIN INTERP: ABNORMAL
TROPONIN T: ABNORMAL NG/ML
TROPONIN, HIGH SENSITIVITY: <6 NG/L (ref 0–22)
WBC # BLD: 14.5 K/UL (ref 3.5–11.3)
WBC # BLD: ABNORMAL 10*3/UL

## 2019-03-24 PROCEDURE — 6360000002 HC RX W HCPCS: Performed by: NURSE PRACTITIONER

## 2019-03-24 PROCEDURE — 2580000003 HC RX 258: Performed by: NURSE PRACTITIONER

## 2019-03-24 PROCEDURE — 96376 TX/PRO/DX INJ SAME DRUG ADON: CPT

## 2019-03-24 PROCEDURE — 83874 ASSAY OF MYOGLOBIN: CPT

## 2019-03-24 PROCEDURE — 83020 HEMOGLOBIN ELECTROPHORESIS: CPT

## 2019-03-24 PROCEDURE — 80048 BASIC METABOLIC PNL TOTAL CA: CPT

## 2019-03-24 PROCEDURE — 6370000000 HC RX 637 (ALT 250 FOR IP): Performed by: NURSE PRACTITIONER

## 2019-03-24 PROCEDURE — 2580000003 HC RX 258: Performed by: INTERNAL MEDICINE

## 2019-03-24 PROCEDURE — 83615 LACTATE (LD) (LDH) ENZYME: CPT

## 2019-03-24 PROCEDURE — 71260 CT THORAX DX C+: CPT

## 2019-03-24 PROCEDURE — 85025 COMPLETE CBC W/AUTO DIFF WBC: CPT

## 2019-03-24 PROCEDURE — 6360000002 HC RX W HCPCS: Performed by: STUDENT IN AN ORGANIZED HEALTH CARE EDUCATION/TRAINING PROGRAM

## 2019-03-24 PROCEDURE — 99223 1ST HOSP IP/OBS HIGH 75: CPT | Performed by: INTERNAL MEDICINE

## 2019-03-24 PROCEDURE — 6360000002 HC RX W HCPCS: Performed by: INTERNAL MEDICINE

## 2019-03-24 PROCEDURE — 87040 BLOOD CULTURE FOR BACTERIA: CPT

## 2019-03-24 PROCEDURE — 87581 M.PNEUMON DNA AMP PROBE: CPT

## 2019-03-24 PROCEDURE — 87633 RESP VIRUS 12-25 TARGETS: CPT

## 2019-03-24 PROCEDURE — 2580000003 HC RX 258: Performed by: STUDENT IN AN ORGANIZED HEALTH CARE EDUCATION/TRAINING PROGRAM

## 2019-03-24 PROCEDURE — 6370000000 HC RX 637 (ALT 250 FOR IP): Performed by: INTERNAL MEDICINE

## 2019-03-24 PROCEDURE — 6360000004 HC RX CONTRAST MEDICATION: Performed by: STUDENT IN AN ORGANIZED HEALTH CARE EDUCATION/TRAINING PROGRAM

## 2019-03-24 PROCEDURE — 84484 ASSAY OF TROPONIN QUANT: CPT

## 2019-03-24 PROCEDURE — 1200000000 HC SEMI PRIVATE

## 2019-03-24 PROCEDURE — 99254 IP/OBS CNSLTJ NEW/EST MOD 60: CPT | Performed by: INTERNAL MEDICINE

## 2019-03-24 PROCEDURE — 6370000000 HC RX 637 (ALT 250 FOR IP): Performed by: STUDENT IN AN ORGANIZED HEALTH CARE EDUCATION/TRAINING PROGRAM

## 2019-03-24 PROCEDURE — 96372 THER/PROPH/DIAG INJ SC/IM: CPT

## 2019-03-24 PROCEDURE — 96365 THER/PROPH/DIAG IV INF INIT: CPT

## 2019-03-24 PROCEDURE — 93005 ELECTROCARDIOGRAM TRACING: CPT

## 2019-03-24 PROCEDURE — 87798 DETECT AGENT NOS DNA AMP: CPT

## 2019-03-24 PROCEDURE — 96375 TX/PRO/DX INJ NEW DRUG ADDON: CPT

## 2019-03-24 PROCEDURE — 71046 X-RAY EXAM CHEST 2 VIEWS: CPT

## 2019-03-24 PROCEDURE — 87205 SMEAR GRAM STAIN: CPT

## 2019-03-24 PROCEDURE — 87486 CHLMYD PNEUM DNA AMP PROBE: CPT

## 2019-03-24 PROCEDURE — 84145 PROCALCITONIN (PCT): CPT

## 2019-03-24 PROCEDURE — 36415 COLL VENOUS BLD VENIPUNCTURE: CPT

## 2019-03-24 PROCEDURE — 85045 AUTOMATED RETICULOCYTE COUNT: CPT

## 2019-03-24 PROCEDURE — 87077 CULTURE AEROBIC IDENTIFY: CPT

## 2019-03-24 PROCEDURE — 94640 AIRWAY INHALATION TREATMENT: CPT

## 2019-03-24 RX ORDER — PROMETHAZINE HYDROCHLORIDE 25 MG/ML
12.5 INJECTION, SOLUTION INTRAMUSCULAR; INTRAVENOUS ONCE
Status: COMPLETED | OUTPATIENT
Start: 2019-03-24 | End: 2019-03-24

## 2019-03-24 RX ORDER — ACETAMINOPHEN 325 MG/1
650 TABLET ORAL EVERY 4 HOURS PRN
Status: DISCONTINUED | OUTPATIENT
Start: 2019-03-24 | End: 2019-03-28 | Stop reason: HOSPADM

## 2019-03-24 RX ORDER — DIPHENHYDRAMINE HYDROCHLORIDE 50 MG/ML
12.5 INJECTION INTRAMUSCULAR; INTRAVENOUS ONCE
Status: COMPLETED | OUTPATIENT
Start: 2019-03-24 | End: 2019-03-24

## 2019-03-24 RX ORDER — DIPHENHYDRAMINE HYDROCHLORIDE 50 MG/ML
25 INJECTION INTRAMUSCULAR; INTRAVENOUS ONCE
Status: COMPLETED | OUTPATIENT
Start: 2019-03-24 | End: 2019-03-24

## 2019-03-24 RX ORDER — ASPIRIN 81 MG/1
324 TABLET, CHEWABLE ORAL DAILY
Status: DISCONTINUED | OUTPATIENT
Start: 2019-03-24 | End: 2019-03-28 | Stop reason: HOSPADM

## 2019-03-24 RX ORDER — OXYCODONE HYDROCHLORIDE AND ACETAMINOPHEN 5; 325 MG/1; MG/1
2 TABLET ORAL EVERY 4 HOURS PRN
Status: DISCONTINUED | OUTPATIENT
Start: 2019-03-24 | End: 2019-03-28 | Stop reason: HOSPADM

## 2019-03-24 RX ORDER — AZITHROMYCIN 250 MG/1
500 TABLET, FILM COATED ORAL DAILY
Status: COMPLETED | OUTPATIENT
Start: 2019-03-24 | End: 2019-03-24

## 2019-03-24 RX ORDER — ACETAMINOPHEN 325 MG/1
650 TABLET ORAL ONCE
Status: COMPLETED | OUTPATIENT
Start: 2019-03-24 | End: 2019-03-24

## 2019-03-24 RX ORDER — ONDANSETRON 2 MG/ML
4 INJECTION INTRAMUSCULAR; INTRAVENOUS EVERY 6 HOURS PRN
Status: DISCONTINUED | OUTPATIENT
Start: 2019-03-24 | End: 2019-03-28 | Stop reason: HOSPADM

## 2019-03-24 RX ORDER — ALBUTEROL SULFATE 2.5 MG/3ML
2.5 SOLUTION RESPIRATORY (INHALATION)
Status: DISCONTINUED | OUTPATIENT
Start: 2019-03-24 | End: 2019-03-24

## 2019-03-24 RX ORDER — SODIUM CHLORIDE 0.9 % (FLUSH) 0.9 %
10 SYRINGE (ML) INJECTION EVERY 12 HOURS SCHEDULED
Status: DISCONTINUED | OUTPATIENT
Start: 2019-03-24 | End: 2019-03-28 | Stop reason: HOSPADM

## 2019-03-24 RX ORDER — VANCOMYCIN HYDROCHLORIDE 1 G/200ML
1000 INJECTION, SOLUTION INTRAVENOUS EVERY 8 HOURS
Status: DISCONTINUED | OUTPATIENT
Start: 2019-03-24 | End: 2019-03-24

## 2019-03-24 RX ORDER — SODIUM CHLORIDE 9 MG/ML
INJECTION, SOLUTION INTRAVENOUS CONTINUOUS
Status: DISCONTINUED | OUTPATIENT
Start: 2019-03-24 | End: 2019-03-28 | Stop reason: HOSPADM

## 2019-03-24 RX ORDER — SODIUM CHLORIDE 0.9 % (FLUSH) 0.9 %
10 SYRINGE (ML) INJECTION PRN
Status: DISCONTINUED | OUTPATIENT
Start: 2019-03-24 | End: 2019-03-28 | Stop reason: HOSPADM

## 2019-03-24 RX ORDER — OXYCODONE HYDROCHLORIDE AND ACETAMINOPHEN 5; 325 MG/1; MG/1
1 TABLET ORAL EVERY 4 HOURS PRN
Status: DISCONTINUED | OUTPATIENT
Start: 2019-03-24 | End: 2019-03-28 | Stop reason: HOSPADM

## 2019-03-24 RX ORDER — IPRATROPIUM BROMIDE AND ALBUTEROL SULFATE 2.5; .5 MG/3ML; MG/3ML
1 SOLUTION RESPIRATORY (INHALATION)
Status: DISCONTINUED | OUTPATIENT
Start: 2019-03-24 | End: 2019-03-24

## 2019-03-24 RX ORDER — AZITHROMYCIN 250 MG/1
250 TABLET, FILM COATED ORAL DAILY
Status: DISCONTINUED | OUTPATIENT
Start: 2019-03-25 | End: 2019-03-25

## 2019-03-24 RX ORDER — NICOTINE 21 MG/24HR
1 PATCH, TRANSDERMAL 24 HOURS TRANSDERMAL DAILY PRN
Status: DISCONTINUED | OUTPATIENT
Start: 2019-03-24 | End: 2019-03-28 | Stop reason: HOSPADM

## 2019-03-24 RX ORDER — 0.9 % SODIUM CHLORIDE 0.9 %
1000 INTRAVENOUS SOLUTION INTRAVENOUS ONCE
Status: COMPLETED | OUTPATIENT
Start: 2019-03-24 | End: 2019-03-24

## 2019-03-24 RX ADMIN — ACETAMINOPHEN 650 MG: 325 TABLET ORAL at 00:36

## 2019-03-24 RX ADMIN — HYDROMORPHONE HYDROCHLORIDE 1 MG: 1 INJECTION, SOLUTION INTRAMUSCULAR; INTRAVENOUS; SUBCUTANEOUS at 14:02

## 2019-03-24 RX ADMIN — VANCOMYCIN HYDROCHLORIDE 1000 MG: 1 INJECTION, SOLUTION INTRAVENOUS at 06:21

## 2019-03-24 RX ADMIN — DIPHENHYDRAMINE HYDROCHLORIDE 25 MG: 50 INJECTION, SOLUTION INTRAMUSCULAR; INTRAVENOUS at 00:35

## 2019-03-24 RX ADMIN — OXYCODONE HYDROCHLORIDE AND ACETAMINOPHEN 2 TABLET: 5; 325 TABLET ORAL at 12:49

## 2019-03-24 RX ADMIN — OXYCODONE HYDROCHLORIDE AND ACETAMINOPHEN 2 TABLET: 5; 325 TABLET ORAL at 17:10

## 2019-03-24 RX ADMIN — PIPERACILLIN AND TAZOBACTAM 3.38 G: 3; .375 INJECTION, POWDER, LYOPHILIZED, FOR SOLUTION INTRAVENOUS; PARENTERAL at 23:03

## 2019-03-24 RX ADMIN — ENOXAPARIN SODIUM 40 MG: 40 INJECTION SUBCUTANEOUS at 12:53

## 2019-03-24 RX ADMIN — PROMETHAZINE HYDROCHLORIDE 12.5 MG: 25 INJECTION INTRAMUSCULAR; INTRAVENOUS at 00:36

## 2019-03-24 RX ADMIN — SODIUM CHLORIDE 1000 ML: 9 INJECTION, SOLUTION INTRAVENOUS at 00:35

## 2019-03-24 RX ADMIN — IOVERSOL 75 ML: 741 INJECTION INTRA-ARTERIAL; INTRAVENOUS at 01:18

## 2019-03-24 RX ADMIN — HYDROMORPHONE HYDROCHLORIDE 1 MG: 1 INJECTION, SOLUTION INTRAMUSCULAR; INTRAVENOUS; SUBCUTANEOUS at 18:59

## 2019-03-24 RX ADMIN — HYDROMORPHONE HYDROCHLORIDE 1.5 MG: 1 INJECTION, SOLUTION INTRAMUSCULAR; INTRAVENOUS; SUBCUTANEOUS at 00:41

## 2019-03-24 RX ADMIN — HYDROMORPHONE HYDROCHLORIDE 1 MG: 1 INJECTION, SOLUTION INTRAMUSCULAR; INTRAVENOUS; SUBCUTANEOUS at 23:36

## 2019-03-24 RX ADMIN — OXYCODONE HYDROCHLORIDE AND ACETAMINOPHEN 2 TABLET: 5; 325 TABLET ORAL at 21:20

## 2019-03-24 RX ADMIN — IPRATROPIUM BROMIDE AND ALBUTEROL SULFATE 1 AMPULE: .5; 3 SOLUTION RESPIRATORY (INHALATION) at 08:04

## 2019-03-24 RX ADMIN — HYDROMORPHONE HYDROCHLORIDE 2 MG: 1 INJECTION, SOLUTION INTRAMUSCULAR; INTRAVENOUS; SUBCUTANEOUS at 01:42

## 2019-03-24 RX ADMIN — ASPIRIN 81 MG 324 MG: 81 TABLET ORAL at 08:02

## 2019-03-24 RX ADMIN — HYDROMORPHONE HYDROCHLORIDE 0.5 MG: 1 INJECTION, SOLUTION INTRAMUSCULAR; INTRAVENOUS; SUBCUTANEOUS at 04:18

## 2019-03-24 RX ADMIN — AZITHROMYCIN 500 MG: 250 TABLET, FILM COATED ORAL at 14:05

## 2019-03-24 RX ADMIN — SODIUM CHLORIDE: 9 INJECTION, SOLUTION INTRAVENOUS at 10:47

## 2019-03-24 RX ADMIN — DIPHENHYDRAMINE HYDROCHLORIDE 12.5 MG: 50 INJECTION, SOLUTION INTRAMUSCULAR; INTRAVENOUS at 04:34

## 2019-03-24 RX ADMIN — Medication 1250 MG: at 07:45

## 2019-03-24 RX ADMIN — HYDROMORPHONE HYDROCHLORIDE 0.5 MG: 1 INJECTION, SOLUTION INTRAMUSCULAR; INTRAVENOUS; SUBCUTANEOUS at 10:47

## 2019-03-24 RX ADMIN — SODIUM CHLORIDE: 9 INJECTION, SOLUTION INTRAVENOUS at 23:02

## 2019-03-24 RX ADMIN — PIPERACILLIN AND TAZOBACTAM 3.38 G: 3; .375 INJECTION, POWDER, LYOPHILIZED, FOR SOLUTION INTRAVENOUS; PARENTERAL at 03:11

## 2019-03-24 RX ADMIN — PIPERACILLIN AND TAZOBACTAM 3.38 G: 3; .375 INJECTION, POWDER, LYOPHILIZED, FOR SOLUTION INTRAVENOUS; PARENTERAL at 14:07

## 2019-03-24 RX ADMIN — HYDROMORPHONE HYDROCHLORIDE 0.5 MG: 1 INJECTION, SOLUTION INTRAMUSCULAR; INTRAVENOUS; SUBCUTANEOUS at 08:02

## 2019-03-24 ASSESSMENT — PAIN DESCRIPTION - FREQUENCY
FREQUENCY: CONTINUOUS
FREQUENCY: CONTINUOUS

## 2019-03-24 ASSESSMENT — PAIN DESCRIPTION - DESCRIPTORS
DESCRIPTORS: ACHING;CONSTANT
DESCRIPTORS: ACHING;CONSTANT
DESCRIPTORS: ACHING;CONSTANT;PRESSURE

## 2019-03-24 ASSESSMENT — PAIN SCALES - GENERAL
PAINLEVEL_OUTOF10: 8
PAINLEVEL_OUTOF10: 8
PAINLEVEL_OUTOF10: 9
PAINLEVEL_OUTOF10: 8
PAINLEVEL_OUTOF10: 8
PAINLEVEL_OUTOF10: 9
PAINLEVEL_OUTOF10: 9
PAINLEVEL_OUTOF10: 7
PAINLEVEL_OUTOF10: 9
PAINLEVEL_OUTOF10: 8
PAINLEVEL_OUTOF10: 9
PAINLEVEL_OUTOF10: 8

## 2019-03-24 ASSESSMENT — ENCOUNTER SYMPTOMS
COLOR CHANGE: 0
CHOKING: 0
EYE PAIN: 0
SHORTNESS OF BREATH: 0
TROUBLE SWALLOWING: 0
SORE THROAT: 0
CHEST TIGHTNESS: 1
STRIDOR: 0
BACK PAIN: 0
NAUSEA: 0
ABDOMINAL DISTENTION: 0
WHEEZING: 0
RHINORRHEA: 0
VOMITING: 0
CONSTIPATION: 0
ABDOMINAL PAIN: 0
COUGH: 0

## 2019-03-24 ASSESSMENT — PAIN DESCRIPTION - PROGRESSION
CLINICAL_PROGRESSION: NOT CHANGED
CLINICAL_PROGRESSION: NOT CHANGED
CLINICAL_PROGRESSION: GRADUALLY WORSENING
CLINICAL_PROGRESSION: NOT CHANGED

## 2019-03-24 ASSESSMENT — PAIN DESCRIPTION - LOCATION
LOCATION: CHEST

## 2019-03-24 ASSESSMENT — PAIN DESCRIPTION - PAIN TYPE
TYPE: ACUTE PAIN;CHRONIC PAIN
TYPE: CHRONIC PAIN;ACUTE PAIN
TYPE: CHRONIC PAIN;ACUTE PAIN
TYPE: ACUTE PAIN
TYPE: ACUTE PAIN

## 2019-03-24 ASSESSMENT — PAIN DESCRIPTION - ORIENTATION
ORIENTATION: RIGHT

## 2019-03-24 ASSESSMENT — PAIN DESCRIPTION - DIRECTION: RADIATING_TOWARDS: RIGHT SHOULDER/ARM

## 2019-03-24 ASSESSMENT — PAIN DESCRIPTION - ONSET: ONSET: ON-GOING

## 2019-03-24 NOTE — ED PROVIDER NOTES
[facility] ED  Emergency Department  Emergency Medicine Resident Sign-out     Care of Tonya Fleming was assumed from Dr. Tevin Polanco and is being seen for Chest Pain (Pt has had symptoms since he was admited to hospital 3/2019); Arm Pain; and Shortness of Breath  . The patient's initial evaluation and plan have been discussed with the prior provider who initially evaluated the patient.      EMERGENCY DEPARTMENT COURSE / MEDICAL DECISION MAKING:       MEDICATIONS GIVEN:  Orders Placed This Encounter   Medications    HYDROmorphone (DILAUDID) injection 1.5 mg    0.9 % sodium chloride bolus    acetaminophen (TYLENOL) tablet 650 mg    diphenhydrAMINE (BENADRYL) injection 25 mg    promethazine (PHENERGAN) injection 12.5 mg    ioversol (OPTIRAY) 74 % injection 75 mL    HYDROmorphone (DILAUDID) injection 0.5 mg    HYDROmorphone (DILAUDID) injection 2 mg    piperacillin-tazobactam (ZOSYN) 3.375 g in dextrose 5 % 50 mL IVPB extended infusion (mini-bag)    vancomycin (VANCOCIN) 1000 mg in dextrose 5% 200 mL IVPB    vancomycin (VANCOCIN) intermittent dosing (placeholder)    magnesium hydroxide (MILK OF MAGNESIA) 400 MG/5ML suspension 30 mL    nicotine (NICODERM CQ) 21 MG/24HR 1 patch     If indicated/pateint smokes    enoxaparin (LOVENOX) injection 40 mg    ipratropium-albuterol (DUONEB) nebulizer solution 1 ampule    acetaminophen (TYLENOL) tablet 650 mg    vancomycin (VANCOCIN) 1250 mg in dextrose 5 % 250 mL IVPB    piperacillin-tazobactam (ZOSYN) 3.375 g in dextrose 50 mL IVPB extended infusion (premix)    HYDROmorphone (DILAUDID) injection 0.5 mg    aspirin chewable tablet 324 mg    piperacillin-tazobactam (ZOSYN) 3.375 g in dextrose 50 mL IVPB extended infusion (premix)    diphenhydrAMINE (BENADRYL) injection 12.5 mg       LABS / RADIOLOGY:     Labs Reviewed   CBC WITH AUTO DIFFERENTIAL - Abnormal; Notable for the following components:       Result Value    WBC 14.5 (*)     RBC 3.91 (*)     Hemoglobin 12.4 (*)     Hematocrit 33.2 (*)     MCHC 37.3 (*)     RDW 14.6 (*)     NRBC Automated 0.9 (*)     Seg Neutrophils 68 (*)     Lymphocytes 23 (*)     Monocytes 8 (*)     Eosinophils % 0 (*)     nRBC 1 (*)     Segs Absolute 9.85 (*)     Absolute Mono # 1.16 (*)     All other components within normal limits   LACTATE DEHYDROGENASE - Abnormal; Notable for the following components:     (*)     All other components within normal limits   RETICULOCYTES - Abnormal; Notable for the following components:    Retic % 5.7 (*)     Absolute Retic # 0.220 (*)     Immature Retic Fract 29.400 (*)     All other components within normal limits   BASIC METABOLIC PANEL - Abnormal; Notable for the following components:    BUN 4 (*)     CREATININE 0.63 (*)     All other components within normal limits   TROP/MYOGLOBIN - Abnormal; Notable for the following components:    Myoglobin 94 (*)     All other components within normal limits   TROP/MYOGLOBIN   TROP/MYOGLOBIN       Xr Chest Standard (2 Vw)    Result Date: 3/24/2019  EXAMINATION: TWO VIEWS OF THE CHEST 3/24/2019 12:24 am COMPARISON: None. HISTORY: ORDERING SYSTEM PROVIDED HISTORY: rule out acute chest syndrome TECHNOLOGIST PROVIDED HISTORY: rule out acute chest syndrome Right-sided chest pain, chest tightness and difficulty breathing for the past 5 days. Prior studies including chest x-ray 10/31/2018 and CT chest 11/01/2018 FINDINGS: There is mild patchy airspace disease in the middle lobe and right lower lobe. There is adjacent pleural thickening and tenting of the right hemidiaphragm. Lungs are otherwise clear. Heart size and configuration are normal.     Right basilar pleural and parenchymal disease may be acute/recurrent. Residual scarring from the acute disease noted on 10/31/2018 is not excluded. Xr Shoulder Right (min 2 Views)    Result Date: 3/11/2019  EXAMINATION: 3 XRAY VIEWS OF THE RIGHT SHOULDER 3/11/2019 8:17 pm COMPARISON: None.  HISTORY: ORDERING SYSTEM PROVIDED HISTORY: rt arm pain TECHNOLOGIST PROVIDED HISTORY: rt arm pain FINDINGS: 3 images of the right shoulder were provided. Alignment is normal. Flattening of the superolateral aspect of the right humeral head is noted raising the question of prior Hill-Sachs deformity. There is no acute fracture. Limited evaluation of the right lung demonstrates no focal abnormality     No acute abnormality. Ct Chest Pulmonary Embolism W Contrast    Result Date: 3/24/2019  EXAMINATION: CTA OF THE CHEST 3/24/2019 1:11 am TECHNIQUE: CTA of the chest was performed after the administration of intravenous contrast.  Multiplanar reformatted images are provided for review. MIP images are provided for review. Dose modulation, iterative reconstruction, and/or weight based adjustment of the mA/kV was utilized to reduce the radiation dose to as low as reasonably achievable. COMPARISON: 10/24/2018 and 11/01/2018 HISTORY: ORDERING SYSTEM PROVIDED HISTORY: right pleuritic pain. sickle cell disease, previous PE TECHNOLOGIST PROVIDED HISTORY: Ordering Physician Provided Reason for Exam: Chest Pain (Pt has had symptoms since he was admited to hospital 3/2019) Acuity: Unknown Type of Exam: Unknown FINDINGS: Pulmonary Arteries: Pulmonary arteries are adequately opacified for evaluation. No evidence of intraluminal filling defect to suggest pulmonary embolism. Main pulmonary artery is normal in caliber. Mediastinum: No evidence of mediastinal lymphadenopathy. The heart and pericardium demonstrate no acute abnormality. There is no acute abnormality of the thoracic aorta. Lungs/pleura: There is mild patchy middle lobe and bilateral lower lobe airspace disease. There is trace right pleural fluid. No pneumothorax. Upper Abdomen: Limited images of the upper abdomen are remarkable for splenectomy. Soft Tissues/Bones: No acute bone or soft tissue abnormality. No evidence of an acute pulmonary embolus.  Mild patchy middle +------------------------------------+----------+---------------+----------+ ! Dist Ulnar                          ! Yes       ! Yes            ! None      ! +------------------------------------+----------+---------------+----------+ ! Basilic at UA                       ! Yes       ! Yes            ! None      ! +------------------------------------+----------+---------------+----------+ ! Basilic at AF                       ! Yes       ! Yes            ! None      ! +------------------------------------+----------+---------------+----------+ ! Basilic at [address]                       ! Yes       ! Yes            ! None      ! +------------------------------------+----------+---------------+----------+ ! Cephalic at UA                      ! Yes       ! Yes            ! None      ! +------------------------------------+----------+---------------+----------+ ! Cephalic at AF                      ! Yes       ! Yes            ! None      ! +------------------------------------+----------+---------------+----------+ ! Cephalic at [address]                      ! Yes       ! Yes            ! None      ! +------------------------------------+----------+---------------+----------+ Doppler Measurements +-------------------------+-----------------------+------------------------+ ! Location                 ! Signal                 !Reflux                  ! +-------------------------+-----------------------+------------------------+ ! IJV                      ! Phasic                 !                        ! +-------------------------+-----------------------+------------------------+ ! SCV                      ! Phasic                 !                        ! +-------------------------+-----------------------+------------------------+ ! Axillary                 ! Phasic                 !                        ! +-------------------------+-----------------------+------------------------+      RECENT VITALS:     Temp: 97.2 °F (36.2 °C),  Pulse: 55, Resp: 16, BP: (!)

## 2019-03-24 NOTE — CARE COORDINATION
Case Management Initial Discharge Plan  Brett Griffith,             Met with:patient to discuss discharge plans. Information verified: address, contacts, phone number, , insurance Yes  PCP: No primary care provider on file. Date of last visit: Patient has a Verle Men appt on 3/26/19 at 1330. Patient is planning on keeping this appt. Insurance Provider: Pending [employer]    Discharge Planning    Living Arrangements:  Parent   Support Systems:  Parent, Family Members, Friends/Neighbors    Home has 2 stories  6 stairs to climb to get into front door, 1 flight stairs to climb to reach second floor  Location of bedroom/bathroom in home second floor     Patient able to perform ADL's:Independent    Current Services (outpatient & in home) none   DME equipment: none   DME provider: none     Pharmacy: [facility]   Potential Assistance Purchasing Medications:     Does patient want to participate in local refill/ meds to beds program?  No    Potential Assistance Needed:  N/A    Patient agreeable to home care: No  [place] of choice provided:  n/a    Prior SNF/Rehab Placement and Facility: no  Agreeable to SNF/Rehab: No  [place] of choice provided: n/a   Evaluation: no    Expected Discharge date:     Patient expects to be discharged to:  home  Follow Up Appointment: Best Day/ Time:      Transportation provider: patient will walk   Transportation arrangements needed for discharge: No    Readmission Risk              Risk of Unplanned Readmission:        14             Does patient have a readmission risk score greater than 14?: No  If yes, follow-up appointment must be made within 7 days of discharge.      Discharge Plan: Home independently           Electronically signed by Liliane Wang RN on 3/24/19 at 10:20 AM

## 2019-03-24 NOTE — PROGRESS NOTES
Evy Lopes, RCPPatient Assessment complete. Chest pain [R07.9] . Vitals:    03/24/19 0935   BP: 111/63   Pulse: 66   Resp: 16   Temp: 98 °F (36.7 °C)   SpO2: 93%   . Patients home meds are   Prior to Admission medications    Not on File   . Recent Surgical History: None = 0     Assessment patient does not take any respiratory medication at home. Patient states he did not come in to the hospital for breathing but for pain. Patient is not in any respiratory distress at this time.        RR 16  Breath Sounds: clear      · Bronchodilator assessment at level  1  · Hyperinflation assessment at level   · Secretion Management assessment at level    ·   · []    Bronchodilator Assessment  BRONCHODILATOR ASSESSMENT SCORE  Score 0 1 2 3 4 5   Breath Sounds   []  Patient Baseline [x]  No Wheeze good aeration []  Faint, scattered wheezing, good aeration []  Expiratory Wheezing and or moderately diminished []  Insp/Exp wheeze and/or very diminished []  Insp/Exp and/ or marked distress   Respiratory Rate   []  Patient Baseline [x]  Less than 20 [x]  Less than 20 []  20-25 []  Greater than 25 []  Greater than 25   Peak flow % of Pred or PB [x]  NA   []  Greater than 90%  []  81-90% []  71-80% []  Less than or equal to 70%  or unable to perform []  Unable due to Respiratory Distress   Dyspnea re []  Patient Baseline [x]  No SOB [x]  No SOB []  SOB on exertion []  SOB min activity []  At rest/acute   e FEV% Predicted       [x]  NA []  Above 69%  []  Unable []  Above 60-69%  []  Unable []  Above 50-59%  []  Unable []  Above 35-49%  []  Unable []  Less than 35%  []  Unable                 []  Hyperinflation Assessment  Score 1 2 3   CXR and Breath Sounds   []  Clear []  No atelectasis  Basilar aeration []  Atelectasis or absent basilar breath sounds   Incentive Spirometry Volume  (Per IBW)   []  Greater than or equal to 15ml/Kg []  less than 15ml/Kg []  less than 15ml/Kg   Surgery within last 2 weeks []  None or general []  Abdominal or thoracic surgery  []  Abdominal or thoracic   Chronic Pulmonary Historyre []  No []  Yes []  Yes     []  Secretion Management Assessment  Score 1 2 3   Bilateral Breath Sounds   []  Occasional Rhonchi []  Scattered Rhonchi []  Course Rhonchi and/or poor aeration   Sputum    []  Small amount of thin secretions []  Moderate amount of viscous secretions []  Copius, Viscious Yellow/ Secretions   CXR as reported by physician []  clear  []  Unavailable []  Infiltrates and/or consolidation  []  Unavailable []  Mucus Plugging and or lobar consolidation  []  Unavailable   Cough []  Strong, productive cough []  Weak productive cough []  No cough or weak non-productive cough   MYLES CEDEÑO  11:51 AM                            FEMALE                                  MALE                            FEV1 Predicted Normal Values                        FEV1 Predicted Normal Values          Age                                     Height in Feet and Inches       Age                                     Height in Feet and Inches       4' 11\" 5' 1\" 5' 3\" 5' 5\" 5' 7\" 5' 9\" 5' 11\" 6' 1\"  4' 11\" 5' 1\" 5' 3\" 5' 5\" 5' 7\" 5' 9\" 5' 11\" 6' 1\"   42 - 45 2.49 2.66 2.84 3.03 3.22 3.42 3.62 3.83 42 - 45 2.82 3.03 3.26 3.49 3.72 3.96 4.22 4.47   46 - 49 2.40 2.57 2.76 2.94 3.14 3.33 3.54 3.75 46 - 49 2.70 2.92 3.14 3.37 3.61 3.85 4.10 4.36   50 - 53 2.31 2.48 2.66 2.85 3.04 3.24 3.45 3.66 50 - 53 2.58 2.80 3.02 3.25 3.49 3.73 3.98 4.24   54 - 57 2.21 2.38 2.57 2.75 2.95 3.14 3.35 3.56 54 - 57 2.46 2.67 2.89 3.12 3.36 3.60 3.85 4.11   58 - 61 2.10 2.28 2.46 2.65 2.84 3.04 3.24 3.45 58 - 61 2.32 2.54 2.76 2.99 3.23 3.47 3.72 3.98   62 - 65 1.99 2.17 2.35 2.54 2.73 2.93 3.13 3.34 62 - 65 2.19 2.40 2.62 2.85 3.09 3.33 3.58 3.84   66 - 69 1.88 2.05 2.23 2.42 2.61 2.81 3.02 3.23 66 - 69 2.04 2.26 2.48 2.71 2.95 3.19 3.44 3.70   70+ 1.82 1.99 2.17 2.36 2.55 2.75 2.95 3.16 70+ 1.97 2.19 2.41 2.64 2.87 3.12 3.37 3.62             Predicted

## 2019-03-24 NOTE — PROGRESS NOTES
Occupational Therapy    Occupational Therapy Not Seen Note    DATE: 3/24/2019  Name: Joe Rosario  : 1995  MRN: [id number]    Patient not available for Occupational Therapy due to:    Pt independent with functional mobility and functional tasks.  Pt with no OT acute care needs at this time, will defer OT eval.    Next Scheduled Treatment: N/A    Electronically signed by Edi Siu OT on 3/24/2019 at 4:07 PM

## 2019-03-24 NOTE — ED PROVIDER NOTES
[facility] ED  Emergency Department Encounter  EmergencyMedicine Resident     Pt Name:Brett Aj  MRN: [id number]  [place] 1995  Date of evaluation: 3/24/19  PCP:  No primary care provider on file. CHIEF COMPLAINT       Chief Complaint   Patient presents with    Chest Pain     Pt has had symptoms since he was admited to hospital 3/2019    Arm Pain    Shortness of Breath       HISTORY OF PRESENT ILLNESS  (Location/Symptom, Timing/Onset, Context/Setting, Quality, Duration, Modifying Factors, Severity.)      Allegra Griffith is a 21 y.o. male who presents with right anterior chest wall pain, right humeral pain and worsening with deep inspiration. He has history of Hg SC disease, PE, DVT, and acute chest syndrome. Pt was recently admitted to the hospital for acute painful episode and d/c'd on 3/13. Pt was normally taking percocet 7.5 mg BID at home for SCC pain, however, he has had no meds due to insurance issues. Denies, SOB, fevers, chills, n/v.  Reports similar character of pain and location as previous episodes. PAST MEDICAL / SURGICAL / SOCIAL / FAMILY HISTORY      has a past medical history of History of blood transfusion, Sickle cell anemia ([facility] [address] Sickle cell disease, type SC ([facility] [address] and Unspecified diseases of blood and blood-forming organs. has a past surgical history that includes laparoscopic splenectomy (Left, 9/2007); Cholecystectomy, laparoscopic (1990); knee surgery (Left, 2008); and Penis surgery ([id number]).     Social History     Socioeconomic History    Marital status: Single     Spouse name: Not on file    Number of children: Not on file    Years of education: Not on file    Highest education level: Not on file   Occupational History    Not on file   Social Needs    Financial resource strain: Not on file    Food insecurity:     Worry: Not on file     Inability: Not on file    Transportation needs:     Medical: Not on file     Non-medical: Not on file Tobacco Use    Smoking status: Current Every Day Smoker     Packs/day: 1.00     Years: 2.00     Pack years: 2.00     Types: Cigarettes    Tobacco comment: marajuana q week   Substance and Sexual Activity    Alcohol use: No    Drug use: Yes     Frequency: 1.0 times per week     Comment: Basim Ignacio, 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    Sexual activity: Yes     Partners: Female     Birth control/protection: Condom   Lifestyle    Physical activity:     Days per week: Not on file     Minutes per session: Not on file    Stress: Not on file   Relationships    Social connections:     Talks on phone: Not on file     Gets together: Not on file     Attends [employer] service: Not on file     Active member of club or organization: Not on file     Attends meetings of clubs or organizations: Not on file     Relationship status: Not on file    Intimate partner violence:     Fear of current or ex partner: Not on file     Emotionally abused: Not on file     Physically abused: Not on file     Forced sexual activity: Not on file   Other Topics Concern    Not on file   Social History Narrative    Not on file       History reviewed. No pertinent family history. Allergies:  Patient has no known allergies. Home Medications:  Prior to Admission medications    Not on File       REVIEW OF SYSTEMS    (2-9 systems for level 4, 10 or more for level 5)      Review of Systems   Constitutional: Positive for activity change. Negative for appetite change, diaphoresis, fatigue and unexpected weight change. HENT: Negative for ear pain, nosebleeds, rhinorrhea, sore throat and trouble swallowing. Eyes: Negative for pain and visual disturbance. Respiratory: Positive for chest tightness. Negative for cough, choking, shortness of breath, wheezing and stridor. Cardiovascular: Positive for chest pain. Negative for palpitations and leg swelling.    Gastrointestinal: Negative for abdominal distention, abdominal pain, constipation, nausea and vomiting. Musculoskeletal: Positive for arthralgias and myalgias. Negative for back pain, joint swelling and neck pain. Skin: Negative for color change and rash. Neurological: Negative for dizziness, weakness, light-headedness, numbness and headaches. PHYSICAL EXAM   (up to 7 for level 4, 8 or more for level 5)      INITIAL VITALS:   BP (!) 112/46   Pulse 55   Temp 97.2 °F (36.2 °C) (Oral)   Resp 16   Ht 6' 2\" (1.88 m)   Wt 180 lb (81.6 kg)   SpO2 96%   BMI 23.11 kg/m²     Physical Exam   Constitutional: He is oriented to person, place, and time. He appears well-developed and well-nourished. He appears distressed. Patient laying on side splinting limbs for comfort clearly in distress   HENT:   Head: Normocephalic and atraumatic. Eyes: Pupils are equal, round, and reactive to light. Conjunctivae and EOM are normal. Right eye exhibits no discharge. Left eye exhibits no discharge. Neck: No tracheal deviation present. Cardiovascular: Normal rate, regular rhythm and normal heart sounds. Exam reveals no friction rub. No murmur heard. Pain to palpation of anterior right upper chest wall. Pulmonary/Chest: No stridor. No respiratory distress. He has no wheezes. He has no rales. No pleural rub, good air entry bilaterally, however decreased and soft area for due to discomfort   Abdominal: He exhibits no distension. There is no tenderness. Musculoskeletal: Normal range of motion. Right shoulder without erythema, swelling however painful to palpation of bony landmarks and with active or passive range of motion. Neurovascular intact distal to the shoulder   Neurological: He is alert and oriented to person, place, and time. He displays normal reflexes. No cranial nerve deficit. Coordination normal.   Skin: Skin is warm and dry. He is not diaphoretic.        DIFFERENTIAL  DIAGNOSIS     PLAN (LABS / IMAGING / EKG):  Orders Placed This Encounter   Procedures    XR CHEST STANDARD (2 VW)    CT CHEST PULMONARY EMBOLISM W CONTRAST    XR CHEST STANDARD (2 VW)    CBC WITH AUTO DIFFERENTIAL    LACTATE DEHYDROGENASE    Reticulocytes    Basic Metabolic Panel    TROP/MYOGLOBIN    DIET GENERAL;    Vital signs per unit routine    Telemetry monitoring    Up as tolerated    Full Code    Pharmacy to Dose: Vancomycin    Inpatient consult to Hospitalist    Initiate Oxygen Therapy Protocol    Pulse Oximetry Spot Check    EKG 12 Lead    PATIENT STATUS (FROM ED OR OR/PROCEDURAL) Observation       MEDICATIONS ORDERED:  Orders Placed This Encounter   Medications    HYDROmorphone (DILAUDID) injection 1.5 mg    0.9 % sodium chloride bolus    acetaminophen (TYLENOL) tablet 650 mg    diphenhydrAMINE (BENADRYL) injection 25 mg    promethazine (PHENERGAN) injection 12.5 mg    ioversol (OPTIRAY) 74 % injection 75 mL    HYDROmorphone (DILAUDID) injection 0.5 mg    HYDROmorphone (DILAUDID) injection 2 mg    piperacillin-tazobactam (ZOSYN) 3.375 g in dextrose 5 % 50 mL IVPB extended infusion (mini-bag)    vancomycin (VANCOCIN) 1000 mg in dextrose 5% 200 mL IVPB    vancomycin (VANCOCIN) intermittent dosing (placeholder)    magnesium hydroxide (MILK OF MAGNESIA) 400 MG/5ML suspension 30 mL    nicotine (NICODERM CQ) 21 MG/24HR 1 patch     If indicated/pateint smokes    enoxaparin (LOVENOX) injection 40 mg    ipratropium-albuterol (DUONEB) nebulizer solution 1 ampule    acetaminophen (TYLENOL) tablet 650 mg    DISCONTD: vancomycin (VANCOCIN) 1250 mg in dextrose 5 % 250 mL IVPB    DISCONTD: piperacillin-tazobactam (ZOSYN) 3.375 g in dextrose 50 mL IVPB extended infusion (premix)    HYDROmorphone (DILAUDID) injection 0.5 mg    aspirin chewable tablet 324 mg    DISCONTD: piperacillin-tazobactam (ZOSYN) 3.375 g in dextrose 50 mL IVPB extended infusion (premix)    diphenhydrAMINE (BENADRYL) injection 12.5 mg    piperacillin-tazobactam (ZOSYN) 3.375 g in dextrose 5 % 50 mL IVPB (mini-bag)       DDX: ACS, SCC, PE.    DIAGNOSTIC RESULTS / EMERGENCY DEPARTMENT COURSE / MDM     LABS:  Results for orders placed or performed during the hospital encounter of 03/23/19   CBC WITH AUTO DIFFERENTIAL   Result Value Ref Range    WBC 14.5 (H) 3.5 - 11.3 k/uL    RBC 3.91 (L) 4.21 - 5.77 m/uL    Hemoglobin 12.4 (L) 13.0 - 17.0 g/dL    Hematocrit 33.2 (L) 40.7 - 50.3 %    MCV 84.9 82.6 - 102.9 fL    MCH 31.7 25.2 - 33.5 pg    MCHC 37.3 (H) 28.4 - 34.8 g/dL    RDW 14.6 (H) 11.8 - 14.4 %    Platelets 125 460 - 101 k/uL    MPV 12.2 8.1 - 13.5 fL    NRBC Automated 0.9 (H) 0.0 per 100 WBC    Differential Type NOT REPORTED     WBC Morphology NOT REPORTED     RBC Morphology NOT REPORTED     Platelet Estimate NOT REPORTED     Immature Granulocytes 0 0 %    Seg Neutrophils 68 (H) 36 - 66 %    Lymphocytes 23 (L) 24 - 44 %    Monocytes 8 (H) 1 - 7 %    Eosinophils % 0 (L) 1 - 4 %    Basophils 1 0 - 2 %    nRBC 1 (H) 0 per 100 WBC    Absolute Immature Granulocyte 0.00 0.00 - 0.30 k/uL    Segs Absolute 9.85 (H) 1.8 - 7.7 k/uL    Absolute Lymph # 3.34 1.0 - 4.8 k/uL    Absolute Mono # 1.16 (H) 0.1 - 0.8 k/uL    Absolute Eos # 0.00 0.0 - 0.4 k/uL    Basophils # 0.15 0.0 - 0.2 k/uL    Morphology ANISOCYTOSIS PRESENT     Morphology 2+ TARGET CELLS     Morphology 1+ POLYCHROMASIA     Morphology SICKLE CELLS PRESENT    LACTATE DEHYDROGENASE   Result Value Ref Range     (H) 135 - 225 U/L   Reticulocytes   Result Value Ref Range    Retic % 5.7 (H) 0.5 - 1.9 %    Absolute Retic # 0.220 (H) 0.030 - 0.080 M/uL    Immature Retic Fract 29.400 (H) 2.7 - 18.3 %    Retic Hemoglobin 33.4 28.2 - 35.7 pg   Basic Metabolic Panel   Result Value Ref Range    Glucose 86 70 - 99 mg/dL    BUN 4 (L) 6 - 20 mg/dL    CREATININE 0.63 (L) 0.70 - 1.20 mg/dL    Bun/Cre Ratio NOT REPORTED 9 - 20    Calcium 8.8 8.6 - 10.4 mg/dL    Sodium 135 135 - 144 mmol/L    Potassium 3.7 3.7 - 5.3 mmol/L    Chloride 102 98 - 107 mmol/L    CO2 24 20 - 31 mmol/L    Anion Gap 9 9 - 17 mmol/L    GFR Non-African American >60 >60 mL/min    GFR African American >60 >60 mL/min    GFR Comment          GFR Staging NOT REPORTED    TROP/MYOGLOBIN   Result Value Ref Range    Troponin, High Sensitivity <6 0 - 22 ng/L    Troponin T NOT REPORTED <0.03 ng/mL    Troponin Interp NOT REPORTED     Myoglobin 94 (H) 28 - 72 ng/mL   EKG 12 Lead   Result Value Ref Range    Ventricular Rate 72 BPM    Atrial Rate 72 BPM    P-R Interval 162 ms    QRS Duration 92 ms    Q-T Interval 372 ms    QTc Calculation (Bazett) 407 ms    P Axis 68 degrees    R Axis 34 degrees    T Axis 61 degrees       IMPRESSION/EMERGENCY DEPARTMENT COURSE:    Pt with right shoulder pain and right anterior chest pain history sickle cell with recent admission for sickle cell pain crisis. ED Course as of Mar 24 0815   Sun Mar 24, 2019   0340 Patient admitted Intermed after CT pulmonary embolism showed no PE however, showed possible consolidation or represent pneumonia versus atelectasis. Patient saturating well surgeon on vancomycin and Zosyn. [RD]   0341 Leukocytosis 14.5 hemoglobin stable at 12.4, reticulocyte response 5.7, . [RD]      ED Course User Index  [RD] Violeta Sims MD       RADIOLOGY:  No evidence of an acute pulmonary embolus.       Mild patchy middle lobe and bilateral lower lobe airspace disease.  This may   represent pneumonia and/or atelectasis. Yolie Last is trace right pleural fluid.        EKG Interpretation    Interpreted by me    Rhythm: normal sinus   Rate: normal  Axis: normal  Ectopy: none  Conduction: normal  ST Segments: 1-2 mm elevation ST-segment and V3, EKG appears similar to previous EKGs  T Waves: no acute change  Q Waves: none    Clinical Impression: no acute changes and normal EKG    All EKG's are interpreted by the Emergency Department Physician who either signs or Co-signs thischart in the absence of a cardiologist.    PROCEDURES:  None    CONSULTS:  PHARMACY TO DOSE VANCOMYCIN  IP CONSULT TO

## 2019-03-24 NOTE — ED NOTES
Pt resting on cot comfortably, NAD noted. Will cont to monitor pt.       Valentina Robertson RN  03/24/19 0906

## 2019-03-24 NOTE — H&P
[facility]    HISTORY AND PHYSICAL EXAMINATION            Date:   3/24/2019  Patient name:  Jhoan Lobo  Date of admission:  3/23/2019 11:56 PM  MRN:   [id number]  Account:  [de-identified]  YOB: 1995  PCP:    No primary care provider on file. Room:   [address] [place] [address]  Code Status:    Full Code    Chief Complaint:     Chief Complaint   Patient presents with    Chest Pain     Pt has had symptoms since he was admited to hospital 3/2019    Arm Pain    Shortness of Breath       History Obtained From:     patient, electronic medical record    History of Present Illness: The patient is a 21 y.o.  AA male who presents with Chest Pain (Pt has had symptoms since he was admited to hospital 3/2019); Arm Pain; and Shortness of Breath   and he is admitted to the hospital for the management of  Cp and shoulder pain. He had the shoulder pain 1st and was hospitalized for sickle crisis earlier this month. Few days after discharge he developed constant pleuritic cp. Took motrin without relief and ran out of percocet from last admission. Denies f/c/s/s/cough. Past Medical History:     Past Medical History:   Diagnosis Date    Chest pain     History of blood transfusion 2x    Sickle cell anemia (HCC)     Sickle cell disease, type SC (HCC) birth    Unspecified diseases of blood and blood-forming organs Sickle Cell        Past Surgical History:     Past Surgical History:   Procedure Laterality Date    CHOLECYSTECTOMY, LAPAROSCOPIC  200    KNEE SURGERY Left 2008    cut by glass,     LAPAROSCOPIC SPLENECTOMY Left 9/2007    PENIS SURGERY  [id number]    ASPIRATION (PRIAPISM)        Medications Prior to Admission:     Prior to Admission medications    Not on File        Allergies:     Patient has no known allergies. Social History:     Tobacco:    reports that he has been smoking cigarettes. He has a 2.00 pack-year smoking history.  He does not have any smokeless tobacco history on file. Alcohol:      reports that he does not drink alcohol. Drug Use:  reports that he has current or past drug history. Frequency: 1.00 time per week. Family History:     Family History   Problem Relation Age of Onset    Sickle Cell Anemia Brother        Review of Systems:     Positive and Negative as described in HPI. CONSTITUTIONAL:  negative for fevers, chills, sweats, fatigue, weight loss  HEENT:  negative for vision, hearing changes, runny nose, throat pain  RESPIRATORY:  negative for cough, congestion, wheezing. CARDIOVASCULAR:  negative for palpitations  GASTROINTESTINAL:  negative for nausea, vomiting, diarrhea, constipation, change in bowel habits, abdominal pain   GENITOURINARY:  negative for difficulty of urination, burning with urination, frequency   INTEGUMENT:  negative for rash, skin lesions, easy bruising   HEMATOLOGIC/LYMPHATIC:  negative for swelling/edema   ALLERGIC/IMMUNOLOGIC:  negative for urticaria , itching  ENDOCRINE:  negative increase in drinking, increase in urination, hot or cold intolerance  MUSCULOSKELETAL:  negative joint pains, muscle aches, swelling of joints  NEUROLOGICAL:  negative for headaches, dizziness, lightheadedness, numbness, pain, tingling extremities  BEHAVIOR/PSYCH:  negative for depression, anxiety    Physical Exam:   /63   Pulse 66   Temp 98 °F (36.7 °C) (Oral)   Resp 16   Ht 6' 2\" (1.88 m)   Wt 180 lb (81.6 kg)   SpO2 93%   BMI 23.11 kg/m²   Temp (24hrs), Av.6 °F (36.4 °C), Min:97.2 °F (36.2 °C), Max:98 °F (36.7 °C)    No results for input(s): POCGLU in the last 72 hours. No intake or output data in the 24 hours ending 19 1209    General Appearance:  alert, well appearing, and in no acute distress  Mental status: oriented to person, place, and time with normal affect  Head:  normocephalic, atraumatic.   Eye: no icterus, redness, pupils equal and reactive, extraocular eye movements Basophils 1 0 - 2 %    nRBC 1 (H) 0 per 100 WBC    Absolute Immature Granulocyte 0.00 0.00 - 0.30 k/uL    Segs Absolute 9.85 (H) 1.8 - 7.7 k/uL    Absolute Lymph # 3.34 1.0 - 4.8 k/uL    Absolute Mono # 1.16 (H) 0.1 - 0.8 k/uL    Absolute Eos # 0.00 0.0 - 0.4 k/uL    Basophils # 0.15 0.0 - 0.2 k/uL    Morphology ANISOCYTOSIS PRESENT     Morphology 2+ TARGET CELLS     Morphology 1+ POLYCHROMASIA     Morphology SICKLE CELLS PRESENT    LACTATE DEHYDROGENASE    Collection Time: 03/24/19 12:44 AM   Result Value Ref Range     (H) 135 - 225 U/L   Reticulocytes    Collection Time: 03/24/19 12:44 AM   Result Value Ref Range    Retic % 5.7 (H) 0.5 - 1.9 %    Absolute Retic # 0.220 (H) 0.030 - 0.080 M/uL    Immature Retic Fract 29.400 (H) 2.7 - 18.3 %    Retic Hemoglobin 33.4 28.2 - 35.7 pg   Basic Metabolic Panel    Collection Time: 03/24/19 12:44 AM   Result Value Ref Range    Glucose 86 70 - 99 mg/dL    BUN 4 (L) 6 - 20 mg/dL    CREATININE 0.63 (L) 0.70 - 1.20 mg/dL    Bun/Cre Ratio NOT REPORTED 9 - 20    Calcium 8.8 8.6 - 10.4 mg/dL    Sodium 135 135 - 144 mmol/L    Potassium 3.7 3.7 - 5.3 mmol/L    Chloride 102 98 - 107 mmol/L    CO2 24 20 - 31 mmol/L    Anion Gap 9 9 - 17 mmol/L    GFR Non-African American >60 >60 mL/min    GFR African American >60 >60 mL/min    GFR Comment          GFR Staging NOT REPORTED    TROP/MYOGLOBIN    Collection Time: 03/24/19  6:11 AM   Result Value Ref Range    Troponin, High Sensitivity <6 0 - 22 ng/L    Troponin T NOT REPORTED <0.03 ng/mL    Troponin Interp NOT REPORTED     Myoglobin 94 (H) 28 - 72 ng/mL       Imaging/Diagnostics:    Ct chest:  Impression   No evidence of an acute pulmonary embolus.       Mild patchy middle lobe and bilateral lower lobe airspace disease.  This may   represent pneumonia and/or atelectasis.  There is trace right pleural fluid.          Assessment :      Primary Problem  Acute chest syndrome due to sickle cell crisis [facility])    Active Hospital Problems

## 2019-03-24 NOTE — CONSULTS
Today's Date: 3/24/2019  Patient Name: Mehran Schneider  Date of admission: 3/23/2019 11:56 PM  Patient's age: 21 y.o., 1995  Admission Dx: Chest pain [R07.9]  Acute chest syndrome due to sickle cell crisis [facility]) [D57.01]    Reason for Consult: management recommendations  Requesting Physician: Karol Landon DO    CHIEF COMPLAINT:  Chest pain, arm pain and shortness of breath. History Obtained From:  patient, electronic medical record    HISTORY OF PRESENT ILLNESS:      The patient is a 21 y.o.  male who is admitted to the hospital for chief complaints of chest pain arm pain and shortness of breath. Patient was earlier discharged from the hospital with acute pain crisis. Patient states that his pain never completely went away even when he was sent home and while he was at home it was sent. CT chest shows bilateral lower lobe infiltrates concerning for atelectasis versus acute chest syndrome. Patient denies any fever. Patient is not tachycardic or tachypneic. Patient is not hypoxic. Patient's hemolytic indicis including LDH and bilirubin and the hemoglobin itself are at baseline. Patient has Hb SC disease. Past Medical History:   has a past medical history of Chest pain, History of blood transfusion, Sickle cell anemia ([address] Sickle cell disease, type SC ([facility] [address] and Unspecified diseases of blood and blood-forming organs. 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      Medications:    Prior to Admission medications    Not on File     Current Facility-Administered Medications   Medication Dose Route Frequency Provider Last Rate Last Dose    HYDROmorphone (DILAUDID) injection 0.5 mg  0.5 mg Intravenous Once Treasure Joy MD   Stopped at 03/24/19 0130    0.9 % sodium chloride infusion   Intravenous Continuous Nani DAVIDSON Blood,  mL/hr at 03/24/19 1250      sodium chloride flush 0.9 % injection 10 mL  10 mL Intravenous 2 times per day RODDY Cedeno CNP        sodium chloride flush 0.9 % injection 10 mL  10 mL Intravenous PRN RODDY Cedeno CNP        magnesium hydroxide (MILK OF MAGNESIA) 400 MG/5ML suspension 30 mL  30 mL Oral Daily PRN RODDY Cedeno CNP        ondansetron (ZOFRAN) injection 4 mg  4 mg Intravenous Q6H PRN RODDY Cedeno CNP        nicotine (NICODERM CQ) 21 MG/24HR 1 patch  1 patch Transdermal Daily PRN RODDY Cedeno CNP        enoxaparin (LOVENOX) injection 40 mg  40 mg Subcutaneous Daily RODDY Cedeno CNP   40 mg at 03/24/19 1253    ipratropium-albuterol (DUONEB) nebulizer solution 1 ampule  1 ampule Inhalation Q4H WA RODDY Cedeno CNP   1 ampule at 03/24/19 0804    acetaminophen (TYLENOL) tablet 650 mg  650 mg Oral Q4H PRN RODDY Cedeno CNP        aspirin chewable tablet 324 mg  324 mg Oral Daily RODDY Cedeno CNP   324 mg at 03/24/19 0802    piperacillin-tazobactam (ZOSYN) 3.375 g in dextrose 5 % 50 mL IVPB (mini-bag)  3.375 g Intravenous Q8H RODDY Cedeno CNP 12.5 mL/hr at 03/24/19 1407 3.375 g at 03/24/19 1407    HYDROmorphone (DILAUDID) injection 1 mg  1 mg Intravenous Q3H PRN Cristóbal P Blood, DO   1 mg at 03/24/19 1402    oxyCODONE-acetaminophen (PERCOCET) 5-325 MG per tablet 1 tablet  1 tablet Oral Q4H PRN Cristóbal P Blood, DO        oxyCODONE-acetaminophen (PERCOCET) 5-325 MG per tablet 2 tablet  2 tablet Oral Q4H PRN Cristóbal P Blood, DO   2 tablet at 03/24/19 1249    [START ON 3/25/2019] azithromycin (ZITHROMAX) tablet 250 mg  250 mg Oral Daily Cristóbal DAVIDSON Blood, DO           Allergies:  Patient has no known allergies. Social History:   reports that he has been smoking cigarettes. He has a 2.00 pack-year smoking history.  He does not have any smokeless tobacco history on file. He reports that he has current or past drug history. Frequency: 1.00 time per week. He reports that he does not drink alcohol. Family History: family history includes Sickle Cell Anemia in his brother. REVIEW OF SYSTEMS:      Constitutional: No fever or chills. No night sweats, no weight loss   Eyes: No eye discharge, double vision, or eye pain   HEENT: negative for sore mouth, sore throat, hoarseness and voice change   Respiratory: negative for cough , sputum, dyspnea, wheezing, hemoptysis, chest pain   Cardiovascular: negative for chest pain, dyspnea, palpitations, orthopnea, PND   Gastrointestinal: negative for nausea, vomiting, diarrhea, constipation, abdominal pain, Dysphagia, hematemesis and hematochezia   Genitourinary: negative for frequency, dysuria, nocturia, urinary incontinence, and hematuria   Integument: negative for rash, skin lesions, bruises. Hematologic/Lymphatic: negative for easy bruising, bleeding, lymphadenopathy, or petechiae   Endocrine: negative for heat or cold intolerance,weight changes, change in bowel habits and hair loss   Musculoskeletal:  Back pain and arm pain.   Neurological: negative for headaches, dizziness, seizures, weakness, numbness    PHYSICAL EXAM:        /63   Pulse 66   Temp 98 °F (36.7 °C) (Oral)   Resp 16   Ht 6' 2\" (1.88 m)   Wt 180 lb (81.6 kg)   SpO2 93%   BMI 23.11 kg/m²    Temp (24hrs), Av.6 °F (36.4 °C), Min:97.2 °F (36.2 °C), Max:98 °F (36.7 °C)      General appearance - well appearing, no in pain or distress   Mental status - alert and cooperative   Eyes - pupils equal and reactive, extraocular eye movements intact   Ears - bilateral TM's and external ear canals normal   Mouth - mucous membranes moist, pharynx normal without lesions   Neck - supple, no significant adenopathy   Lymphatics - no palpable lymphadenopathy, no hepatosplenomegaly   Chest -  decreased breathing sounds  Heart - normal rate, regular rhythm, normal S1, S2, no murmurs  Abdomen - soft, nontender, nondistended, no masses or organomegaly   Neurological - alert, oriented, normal speech, no focal findings or movement disorder noted   Musculoskeletal - no joint tenderness, deformity or swelling   Extremities - peripheral pulses normal, no pedal edema, no clubbing or cyanosis   Skin - normal coloration and turgor, no rashes, no suspicious skin lesions noted ,      DATA:      Labs:     CBC:   Recent Labs     03/24/19 0044   WBC 14.5*   HGB 12.4*   HCT 33.2*        BMP:   Recent Labs     03/24/19 0044      K 3.7   CO2 24   BUN 4*   CREATININE 0.63*   LABGLOM >60   GLUCOSE 86     Results for orders placed or performed during the hospital encounter of 03/23/19   CULTURE BLOOD #1   Result Value Ref Range    Specimen Description . BLOOD     Special Requests 10ML R AC     Culture NO GROWTH 6 HOURS    CULTURE BLOOD #2   Result Value Ref Range    Specimen Description . BLOOD     Special Requests 6ML R FA     Culture NO GROWTH 6 HOURS    Respiratory Virus PCR Panel   Result Value Ref Range    Specimen Description . NASOPHARYNGEAL SWAB     Adenovirus PCR Not Detected Not Detected    Coronavirus 229E PCR Not Detected Not Detected    Coronavirus HKU1 PCR Not Detected Not Detected    Coronavirus NL63 PCR Not Detected Not Detected    Coronavirus OC43 PCR Not Detected Not Detected    Human Metapneumovirus PCR Not Detected Not Detected    Rhino/Enterovirus PCR Not Detected Not Detected    Influenza A by PCR Not Detected Not Detected    Influenza A H1 PCR NOT REPORTED Not Detected    Influenza A H1 (2009) PCR NOT REPORTED Not Detected    Influenza A H3 PCR NOT REPORTED Not Detected    Influenza B by PCR Not Detected Not Detected    Parainfluenza 1 PCR Not Detected Not Detected    Parainfluenza 2 PCR Not Detected Not Detected    Parainfluenza 3 PCR Not Detected Not Detected    Parainfluenza 4 PCR Not Detected Not Detected    Resp Syncytial Virus PCR Not Detected Not Detected    B Pertussis by PCR Not Detected Not Detected    Chlamydia pneumoniae By PCR Not Detected Not Detected    Mycoplasma pneumo by PCR Not Detected Not Detected   CBC WITH AUTO DIFFERENTIAL   Result Value Ref Range    WBC 14.5 (H) 3.5 - 11.3 k/uL    RBC 3.91 (L) 4.21 - 5.77 m/uL    Hemoglobin 12.4 (L) 13.0 - 17.0 g/dL    Hematocrit 33.2 (L) 40.7 - 50.3 %    MCV 84.9 82.6 - 102.9 fL    MCH 31.7 25.2 - 33.5 pg    MCHC 37.3 (H) 28.4 - 34.8 g/dL    RDW 14.6 (H) 11.8 - 14.4 %    Platelets 750 668 - 666 k/uL    MPV 12.2 8.1 - 13.5 fL    NRBC Automated 0.9 (H) 0.0 per 100 WBC    Differential Type NOT REPORTED     WBC Morphology NOT REPORTED     RBC Morphology NOT REPORTED     Platelet Estimate NOT REPORTED     Immature Granulocytes 0 0 %    Seg Neutrophils 68 (H) 36 - 66 %    Lymphocytes 23 (L) 24 - 44 %    Monocytes 8 (H) 1 - 7 %    Eosinophils % 0 (L) 1 - 4 %    Basophils 1 0 - 2 %    nRBC 1 (H) 0 per 100 WBC    Absolute Immature Granulocyte 0.00 0.00 - 0.30 k/uL    Segs Absolute 9.85 (H) 1.8 - 7.7 k/uL    Absolute Lymph # 3.34 1.0 - 4.8 k/uL    Absolute Mono # 1.16 (H) 0.1 - 0.8 k/uL    Absolute Eos # 0.00 0.0 - 0.4 k/uL    Basophils # 0.15 0.0 - 0.2 k/uL    Morphology ANISOCYTOSIS PRESENT     Morphology 2+ TARGET CELLS     Morphology 1+ POLYCHROMASIA     Morphology SICKLE CELLS PRESENT    LACTATE DEHYDROGENASE   Result Value Ref Range     (H) 135 - 225 U/L   Reticulocytes   Result Value Ref Range    Retic % 5.7 (H) 0.5 - 1.9 %    Absolute Retic # 0.220 (H) 0.030 - 0.080 M/uL    Immature Retic Fract 29.400 (H) 2.7 - 18.3 %    Retic Hemoglobin 33.4 28.2 - 35.7 pg   Basic Metabolic Panel   Result Value Ref Range    Glucose 86 70 - 99 mg/dL    BUN 4 (L) 6 - 20 mg/dL    CREATININE 0.63 (L) 0.70 - 1.20 mg/dL    Bun/Cre Ratio NOT REPORTED 9 - 20    Calcium 8.8 8.6 - 10.4 mg/dL    Sodium 135 135 - 144 mmol/L    Potassium 3.7 3.7 - 5.3 mmol/L    Chloride 102 98 - 107 mmol/L    CO2 24 20 - 31 mmol/L Anion Gap 9 9 - 17 mmol/L    GFR Non-African American >60 >60 mL/min    GFR African American >60 >60 mL/min    GFR Comment          GFR Staging NOT REPORTED    TROP/MYOGLOBIN   Result Value Ref Range    Troponin, High Sensitivity <6 0 - 22 ng/L    Troponin T NOT REPORTED <0.03 ng/mL    Troponin Interp NOT REPORTED     Myoglobin 94 (H) 28 - 72 ng/mL   Procalcitonin   Result Value Ref Range    Procalcitonin 0.08 <0.09 ng/mL   EKG 12 Lead   Result Value Ref Range    Ventricular Rate 72 BPM    Atrial Rate 72 BPM    P-R Interval 162 ms    QRS Duration 92 ms    Q-T Interval 372 ms    QTc Calculation (Bazett) 407 ms    P Axis 68 degrees    R Axis 34 degrees    T Axis 61 degrees         IMAGING DATA:    Xr Chest Standard (2 Vw)    Result Date: 3/24/2019  EXAMINATION: TWO VIEWS OF THE CHEST 3/24/2019 12:24 am COMPARISON: None. HISTORY: ORDERING SYSTEM PROVIDED HISTORY: rule out acute chest syndrome TECHNOLOGIST PROVIDED HISTORY: rule out acute chest syndrome Right-sided chest pain, chest tightness and difficulty breathing for the past 5 days. Prior studies including chest x-ray 10/31/2018 and CT chest 11/01/2018 FINDINGS: There is mild patchy airspace disease in the middle lobe and right lower lobe. There is adjacent pleural thickening and tenting of the right hemidiaphragm. Lungs are otherwise clear. Heart size and configuration are normal.     Right basilar pleural and parenchymal disease may be acute/recurrent. Residual scarring from the acute disease noted on 10/31/2018 is not excluded. Xr Shoulder Right (min 2 Views)    Result Date: 3/11/2019  EXAMINATION: 3 XRAY VIEWS OF THE RIGHT SHOULDER 3/11/2019 8:17 pm COMPARISON: None. HISTORY: ORDERING SYSTEM PROVIDED HISTORY: rt arm pain TECHNOLOGIST PROVIDED HISTORY: rt arm pain FINDINGS: 3 images of the right shoulder were provided.   Alignment is normal. Flattening of the superolateral aspect of the right humeral head is noted raising the question of prior Hill-Sachs deformity. There is no acute fracture. Limited evaluation of the right lung demonstrates no focal abnormality     No acute abnormality. Ct Chest Pulmonary Embolism W Contrast    Result Date: 3/24/2019  EXAMINATION: CTA OF THE CHEST 3/24/2019 1:11 am TECHNIQUE: CTA of the chest was performed after the administration of intravenous contrast.  Multiplanar reformatted images are provided for review. MIP images are provided for review. Dose modulation, iterative reconstruction, and/or weight based adjustment of the mA/kV was utilized to reduce the radiation dose to as low as reasonably achievable. COMPARISON: 10/24/2018 and 11/01/2018 HISTORY: ORDERING SYSTEM PROVIDED HISTORY: right pleuritic pain. sickle cell disease, previous PE TECHNOLOGIST PROVIDED HISTORY: Ordering Physician Provided Reason for Exam: Chest Pain (Pt has had symptoms since he was admited to hospital 3/2019) Acuity: Unknown Type of Exam: Unknown FINDINGS: Pulmonary Arteries: Pulmonary arteries are adequately opacified for evaluation. No evidence of intraluminal filling defect to suggest pulmonary embolism. Main pulmonary artery is normal in caliber. Mediastinum: No evidence of mediastinal lymphadenopathy. The heart and pericardium demonstrate no acute abnormality. There is no acute abnormality of the thoracic aorta. Lungs/pleura: There is mild patchy middle lobe and bilateral lower lobe airspace disease. There is trace right pleural fluid. No pneumothorax. Upper Abdomen: Limited images of the upper abdomen are remarkable for splenectomy. Soft Tissues/Bones: No acute bone or soft tissue abnormality. No evidence of an acute pulmonary embolus. Mild patchy middle lobe and bilateral lower lobe airspace disease. This may represent pneumonia and/or atelectasis. There is trace right pleural fluid.      Vl Dup Upper Extremity Venous Right    Result Date: 3/12/2019    [facility]  Vascular Upper Extremities Veins Procedure   Patient Name  YEIMI      Date of Study         03/12/2019                HEATHER SUBRAMANIAN   Date of Birth 1995  Gender                Male   Age           21 year(s)  Race                  Black   Room Number   2009   Corporate ID  [id number]  #   Patient Acct  [de-identified]  #   MR #          [id number]     Sonographer           Kristin Shoemaker   Accession #   [id number]   Interpreting          Shay Dailey                            Physician   Referring                 Referring Physician   Luz De León, *  Nurse  Practitioner  Procedure Type of Study:   Veins: Upper Extremities Veins, Venous Scan Upper Right. Indications for Study:Arm pain. Patient Status: In Patient. Conclusions   Summary   No evidence of superficial or deep venous thrombosis in the right upper  extremity. Signature   ----------------------------------------------------------------  Electronically signed by Shay Dailey(Interpreting physician)  on 03/12/2019 09:37 PM  ----------------------------------------------------------------  Findings:   Right Impression:  Right internal jugular, subclavian, axillary, brachial, ulnar, radial,  cephalic and basilic veins are compressible with normal doppler  responses. Risk Factors History +---------+----+-----------------------------------------------------------+ ! Diagnosis! Date! Comments                                                   ! +---------+----+-----------------------------------------------------------+ ! Other    ! ! Sickle Cell Anemia                                         ! +---------+----+-----------------------------------------------------------+ Velocities are measured in cm/s ; Diameters are measured in cm Right UE Vein Measurements 2D Measurements +------------------------------------+----------+---------------+----------+ ! Location                            ! Visualized! Compressibility! Thrombosis! +------------------------------------+----------+---------------+----------+ ! Prox IJV                            ! Yes       ! Yes            ! None      ! +------------------------------------+----------+---------------+----------+ ! Dist IJV                            ! Yes       ! Yes            ! None      ! +------------------------------------+----------+---------------+----------+ ! Prox SCV                            ! Yes       ! Yes            ! None      ! +------------------------------------+----------+---------------+----------+ ! Dist SCV                            ! Yes       ! Yes            ! None      ! +------------------------------------+----------+---------------+----------+ ! Prox Axillary                       ! Yes       ! Yes            ! None      ! +------------------------------------+----------+---------------+----------+ ! Dist Axillary                       ! Yes       ! Yes            ! None      ! +------------------------------------+----------+---------------+----------+ ! Prox Brachial                       !Yes       ! Yes            ! None      ! +------------------------------------+----------+---------------+----------+ ! Dist Brachial                       !Yes       ! Yes            ! None      ! +------------------------------------+----------+---------------+----------+ ! Prox Radial                         !Yes       ! Yes            ! None      ! +------------------------------------+----------+---------------+----------+ ! Dist Radial                         !Yes       ! Yes            ! None      ! +------------------------------------+----------+---------------+----------+ ! Prox Ulnar                          ! Yes       ! Yes            ! None      ! +------------------------------------+----------+---------------+----------+ ! Dist Ulnar                          ! Yes       ! Yes            ! None      ! +------------------------------------+----------+---------------+----------+ ! Alex at [facility] with the patient and personally went over results of his lab workup imaging studies are the relevant clinical data. Clinically patient is not tachycardic, tachypneic, hypoxic or in acute distress. He does complain of back pain and arm pain which has been going on. Patient does not have any fever or either. Complains of some cough that not bringing up any discolorized phlegm. More so patient has hemoglobin SC disease which is very rarely associated with acute chest syndrome. Clinically I do not suspect patient has acute chest syndrome. Recommend treatment for possible pneumonia versus atelectasis. Continue pain control hydration. Continue symptom management  Patient was also advised to use incentive spirometer  Patient was counseled on tobacco cessation      Discussed with patient and Nurse. Thank you for asking us to see this patient. Curt Dockery MD        This note is created with the assistance of a speech recognition program.  While intending to generate a document that actually reflects the content of the visit, the document can still have some errors including those of syntax and sound a like substitutions which may escape proof reading. It such instances, actual meaning can be extrapolated by contextual diversion.

## 2019-03-24 NOTE — ED PROVIDER NOTES
SICKLE CELL HX, WITH CHEST PAIN, CXR POS FOR INFILTRATE, DX OF ACUTE CHEST SYNDROME. ADMITTED.        Bessy Montalvo MD  03/24/19 1962

## 2019-03-25 LAB
ABSOLUTE RETIC #: 0.17 M/UL (ref 0.03–0.08)
ANION GAP SERPL CALCULATED.3IONS-SCNC: 12 MMOL/L (ref 9–17)
BUN BLDV-MCNC: 3 MG/DL (ref 6–20)
BUN/CREAT BLD: ABNORMAL (ref 9–20)
CALCIUM SERPL-MCNC: 8 MG/DL (ref 8.6–10.4)
CHLORIDE BLD-SCNC: 104 MMOL/L (ref 98–107)
CO2: 23 MMOL/L (ref 20–31)
CREAT SERPL-MCNC: 0.6 MG/DL (ref 0.7–1.2)
EKG ATRIAL RATE: 72 BPM
EKG P AXIS: 68 DEGREES
EKG P-R INTERVAL: 162 MS
EKG Q-T INTERVAL: 372 MS
EKG QRS DURATION: 92 MS
EKG QTC CALCULATION (BAZETT): 407 MS
EKG R AXIS: 34 DEGREES
EKG T AXIS: 61 DEGREES
EKG VENTRICULAR RATE: 72 BPM
GFR AFRICAN AMERICAN: >60 ML/MIN
GFR NON-AFRICAN AMERICAN: >60 ML/MIN
GFR SERPL CREATININE-BSD FRML MDRD: ABNORMAL ML/MIN/{1.73_M2}
GFR SERPL CREATININE-BSD FRML MDRD: ABNORMAL ML/MIN/{1.73_M2}
GLUCOSE BLD-MCNC: 95 MG/DL (ref 70–99)
HCT VFR BLD CALC: 29.5 % (ref 40.7–50.3)
HEMOGLOBIN: 10.8 G/DL (ref 13–17)
HGB ELECTROPHORESIS INTERP: NORMAL
IMMATURE RETIC FRACT: 25.8 % (ref 2.7–18.3)
LACTATE DEHYDROGENASE: 335 U/L (ref 135–225)
MAGNESIUM: 1.9 MG/DL (ref 1.6–2.6)
MCH RBC QN AUTO: 31.5 PG (ref 25.2–33.5)
MCHC RBC AUTO-ENTMCNC: 36.6 G/DL (ref 28.4–34.8)
MCV RBC AUTO: 86 FL (ref 82.6–102.9)
NRBC AUTOMATED: 0.6 PER 100 WBC
PATHOLOGIST: NORMAL
PDW BLD-RTO: 14.9 % (ref 11.8–14.4)
PLATELET # BLD: 196 K/UL (ref 138–453)
PMV BLD AUTO: 12.6 FL (ref 8.1–13.5)
POTASSIUM SERPL-SCNC: 3.4 MMOL/L (ref 3.7–5.3)
RBC # BLD: 3.43 M/UL (ref 4.21–5.77)
RETIC %: 5 % (ref 0.5–1.9)
RETIC HEMOGLOBIN: 31.2 PG (ref 28.2–35.7)
SODIUM BLD-SCNC: 139 MMOL/L (ref 135–144)
WBC # BLD: 11.6 K/UL (ref 3.5–11.3)

## 2019-03-25 PROCEDURE — 99406 BEHAV CHNG SMOKING 3-10 MIN: CPT

## 2019-03-25 PROCEDURE — 6360000002 HC RX W HCPCS: Performed by: INTERNAL MEDICINE

## 2019-03-25 PROCEDURE — 80048 BASIC METABOLIC PNL TOTAL CA: CPT

## 2019-03-25 PROCEDURE — 6370000000 HC RX 637 (ALT 250 FOR IP): Performed by: INTERNAL MEDICINE

## 2019-03-25 PROCEDURE — 83615 LACTATE (LD) (LDH) ENZYME: CPT

## 2019-03-25 PROCEDURE — 83735 ASSAY OF MAGNESIUM: CPT

## 2019-03-25 PROCEDURE — 85027 COMPLETE CBC AUTOMATED: CPT

## 2019-03-25 PROCEDURE — 99232 SBSQ HOSP IP/OBS MODERATE 35: CPT | Performed by: INTERNAL MEDICINE

## 2019-03-25 PROCEDURE — 2580000003 HC RX 258: Performed by: INTERNAL MEDICINE

## 2019-03-25 PROCEDURE — 85045 AUTOMATED RETICULOCYTE COUNT: CPT

## 2019-03-25 PROCEDURE — 6370000000 HC RX 637 (ALT 250 FOR IP): Performed by: NURSE PRACTITIONER

## 2019-03-25 PROCEDURE — 6360000002 HC RX W HCPCS: Performed by: NURSE PRACTITIONER

## 2019-03-25 PROCEDURE — 36415 COLL VENOUS BLD VENIPUNCTURE: CPT

## 2019-03-25 PROCEDURE — 2580000003 HC RX 258: Performed by: NURSE PRACTITIONER

## 2019-03-25 PROCEDURE — 1200000000 HC SEMI PRIVATE

## 2019-03-25 RX ADMIN — OXYCODONE HYDROCHLORIDE AND ACETAMINOPHEN 2 TABLET: 5; 325 TABLET ORAL at 09:46

## 2019-03-25 RX ADMIN — OXYCODONE HYDROCHLORIDE AND ACETAMINOPHEN 2 TABLET: 5; 325 TABLET ORAL at 05:28

## 2019-03-25 RX ADMIN — SODIUM CHLORIDE: 9 INJECTION, SOLUTION INTRAVENOUS at 16:28

## 2019-03-25 RX ADMIN — OXYCODONE HYDROCHLORIDE AND ACETAMINOPHEN 2 TABLET: 5; 325 TABLET ORAL at 01:24

## 2019-03-25 RX ADMIN — HYDROMORPHONE HYDROCHLORIDE 1 MG: 1 INJECTION, SOLUTION INTRAMUSCULAR; INTRAVENOUS; SUBCUTANEOUS at 06:32

## 2019-03-25 RX ADMIN — OXYCODONE HYDROCHLORIDE AND ACETAMINOPHEN 2 TABLET: 5; 325 TABLET ORAL at 18:23

## 2019-03-25 RX ADMIN — HYDROMORPHONE HYDROCHLORIDE 1 MG: 1 INJECTION, SOLUTION INTRAMUSCULAR; INTRAVENOUS; SUBCUTANEOUS at 16:31

## 2019-03-25 RX ADMIN — ASPIRIN 81 MG 324 MG: 81 TABLET ORAL at 08:52

## 2019-03-25 RX ADMIN — HYDROMORPHONE HYDROCHLORIDE 1 MG: 1 INJECTION, SOLUTION INTRAMUSCULAR; INTRAVENOUS; SUBCUTANEOUS at 02:36

## 2019-03-25 RX ADMIN — ENOXAPARIN SODIUM 40 MG: 40 INJECTION SUBCUTANEOUS at 08:52

## 2019-03-25 RX ADMIN — OXYCODONE HYDROCHLORIDE AND ACETAMINOPHEN 2 TABLET: 5; 325 TABLET ORAL at 13:33

## 2019-03-25 RX ADMIN — HYDROMORPHONE HYDROCHLORIDE 1 MG: 1 INJECTION, SOLUTION INTRAMUSCULAR; INTRAVENOUS; SUBCUTANEOUS at 10:58

## 2019-03-25 RX ADMIN — HYDROMORPHONE HYDROCHLORIDE 1 MG: 1 INJECTION, SOLUTION INTRAMUSCULAR; INTRAVENOUS; SUBCUTANEOUS at 20:30

## 2019-03-25 RX ADMIN — PIPERACILLIN AND TAZOBACTAM 3.38 G: 3; .375 INJECTION, POWDER, LYOPHILIZED, FOR SOLUTION INTRAVENOUS; PARENTERAL at 07:35

## 2019-03-25 RX ADMIN — OXYCODONE HYDROCHLORIDE AND ACETAMINOPHEN 2 TABLET: 5; 325 TABLET ORAL at 23:13

## 2019-03-25 ASSESSMENT — PAIN DESCRIPTION - PAIN TYPE
TYPE: ACUTE PAIN

## 2019-03-25 ASSESSMENT — PAIN SCALES - GENERAL
PAINLEVEL_OUTOF10: 7
PAINLEVEL_OUTOF10: 7
PAINLEVEL_OUTOF10: 10
PAINLEVEL_OUTOF10: 7
PAINLEVEL_OUTOF10: 8
PAINLEVEL_OUTOF10: 8
PAINLEVEL_OUTOF10: 7
PAINLEVEL_OUTOF10: 6
PAINLEVEL_OUTOF10: 7
PAINLEVEL_OUTOF10: 8
PAINLEVEL_OUTOF10: 10
PAINLEVEL_OUTOF10: 8
PAINLEVEL_OUTOF10: 7

## 2019-03-25 ASSESSMENT — PAIN DESCRIPTION - LOCATION
LOCATION: CHEST

## 2019-03-25 ASSESSMENT — PAIN DESCRIPTION - DESCRIPTORS
DESCRIPTORS: ACHING;CONSTANT

## 2019-03-25 ASSESSMENT — PAIN DESCRIPTION - PROGRESSION
CLINICAL_PROGRESSION: GRADUALLY WORSENING
CLINICAL_PROGRESSION: GRADUALLY IMPROVING
CLINICAL_PROGRESSION: GRADUALLY WORSENING
CLINICAL_PROGRESSION: NOT CHANGED
CLINICAL_PROGRESSION: NOT CHANGED

## 2019-03-25 ASSESSMENT — PAIN DESCRIPTION - DIRECTION: RADIATING_TOWARDS: RIGHT SHOULDER/ARM

## 2019-03-25 ASSESSMENT — PAIN DESCRIPTION - ORIENTATION
ORIENTATION: RIGHT
ORIENTATION: ANTERIOR;RIGHT
ORIENTATION: RIGHT

## 2019-03-25 ASSESSMENT — PAIN DESCRIPTION - FREQUENCY
FREQUENCY: CONTINUOUS

## 2019-03-25 ASSESSMENT — PAIN DESCRIPTION - ONSET
ONSET: ON-GOING

## 2019-03-25 ASSESSMENT — PAIN - FUNCTIONAL ASSESSMENT
PAIN_FUNCTIONAL_ASSESSMENT: ACTIVITIES ARE NOT PREVENTED
PAIN_FUNCTIONAL_ASSESSMENT: ACTIVITIES ARE NOT PREVENTED

## 2019-03-25 NOTE — PROGRESS NOTES
Smoking Cessation - topics covered   [x]  Health Risks  [x]  Benefits of Quitting   [x]  Smoking Cessation  []  Patient has no history of tobacco use  []  Patient is former smoker. []  No need for tobacco cessation education. [x]  Booklet given (declined)  [x]  Patient verbalizes understanding. []  Patient denies need for tobacco cessation education. []  Unable to meet with patient today. Will follow up as able.   Billy Null  1:51 PM

## 2019-03-25 NOTE — PROGRESS NOTES
[facility]    Progress Note    3/25/2019    2:41 PM    Name:   Manjula Record  MRN:     [id number]     Acct:      [de-identified]   Room:   [address] [place] [address] Day:  1  Admit Date:  3/23/2019 11:56 PM    PCP:   No primary care provider on file. Code Status:  Full Code    Subjective:     C/C:   Chief Complaint   Patient presents with    Chest Pain     Pt has had symptoms since he was admited to hospital 3/2019    Arm Pain    Shortness of Breath     Interval History Status: not changed. No acute issues overnight  Pro calcitonin negative  No respiratory symptoms  Still c/o chest pain, states chronic  Low suspicion ACS per hematology       Brief History:     The patient is a 21 y.o.  AA male who presents with Chest Pain (Pt has had symptoms since he was admited to hospital 3/2019); Arm Pain; and Shortness of Breath   and he is admitted to the hospital for the management of  Cp and shoulder pain. He had the shoulder pain 1st and was hospitalized for sickle crisis earlier this month. Few days after discharge he developed constant pleuritic cp. Took motrin without relief and ran out of percocet from last admission. Denies f/c/s/s/cough. Review of Systems:     Constitutional:  negative for chills, fevers, sweats  Respiratory:  negative for cough, dyspnea on exertion, hemoptysis, shortness of breath, wheezing  Cardiovascular:  Positive for chest pain  Gastrointestinal:  negative for abdominal pain, constipation, diarrhea, nausea, vomiting  Neurological:  negative for dizziness, headache    Medications:      Allergies:  No Known Allergies    Current Meds:   Scheduled Meds:    HYDROmorphone  0.5 mg Intravenous Once    sodium chloride flush  10 mL Intravenous 2 times per day    enoxaparin  40 mg Subcutaneous Daily    aspirin  324 mg Oral Daily     Continuous Infusions:    sodium chloride 125 mL/hr at 03/24/19 2302     PRN Meds: sodium Value Date/Time    SPECIAL 10ML R AC 03/24/2019 10:35 AM    SPECIAL 6ML R FA 03/24/2019 10:35 AM     Lab Results   Component Value Date/Time    CULTURE NO GROWTH 1 DAY 03/24/2019 10:35 AM    CULTURE NO GROWTH 1 DAY 03/24/2019 10:35 AM       No results found for: POCPH, PHART, PH, POCPCO2, ECC6AUT, PCO2, POCPO2, PO2ART, PO2, POCHCO3, SHQ2VEM, HCO3, NBEA, PBEA, BEART, BE, THGBART, THB, CFF4RWJ, WCUH4OJN, I6ILQDHB, O2SAT, FIO2      Physical Examination:        General appearance:  alert, cooperative and no distress  Mental Status:  oriented to person, place and time and normal affect  Lungs:  clear to auscultation bilaterally, normal effort  Heart:  regular rate and rhythm  Abdomen:  soft, nontender, nondistended, normal bowel sounds  Extremities:  no edema, redness, tenderness in the calves  Skin:  no gross lesions, rashes, induration    Assessment:        Primary Problem  Acute chest syndrome [facility])    Active Hospital Problems    Diagnosis Date Noted    Acute chest syndrome ([facility] [D57.01] 03/24/2019    Tobacco abuse [Z72.0] 10/24/2018    Sickle cell anemia ([facility] [D57.1]        Plan:        - Hematology following - low suspicion for ACS  - DC abx as pro calcitonin negative  - Continue IVF  - Pain control  - Monitor reticulocyte counts and LDH      Michael Blanco MD  3/25/2019  2:41 PM

## 2019-03-25 NOTE — PROGRESS NOTES
Received a call from Rm Lazo stating patient walked down and picked up a previously ordered Percocet prescription.  Percocet bottle with accurate count obtained and locked in med room per [facility]

## 2019-03-25 NOTE — PROGRESS NOTES
Today's Date: 3/25/2019  Patient Name: Saw Gaxiola  Date of admission: 3/23/2019 11:56 PM  Patient's age: 21 y.o., 1995  Admission Dx: Chest pain [R07.9]  Acute chest syndrome due to sickle cell crisis [facility]) [D57.01]    Reason for Consult: management recommendations  Requesting Physician: Jolynn Landon DO    CHIEF COMPLAINT:  Chest pain, arm pain and shortness of breath. History Obtained From:  patient, electronic medical record    Interval history:    Patient continues to complain of chest pain. Complains of difficulty taking deep breaths. Patient is not tachycardic or tachypneic. No fevers recorded. Continues to be ambulatory and goes outside to smoke according to the nursing staff    HISTORY OF PRESENT ILLNESS:      The patient is a 21 y.o.  male who is admitted to the hospital for chief complaints of chest pain arm pain and shortness of breath. Patient was earlier discharged from the hospital with acute pain crisis. Patient states that his pain never completely went away even when he was sent home and while he was at home it was sent. CT chest shows bilateral lower lobe infiltrates concerning for atelectasis versus acute chest syndrome. Patient denies any fever. Patient is not tachycardic or tachypneic. Patient is not hypoxic. Patient's hemolytic indicis including LDH and bilirubin and the hemoglobin itself are at baseline. Patient has Hb SC disease. Past Medical History:   has a past medical history of Chest pain, History of blood transfusion, Sickle cell anemia ([address] Sickle cell disease, type SC ([address] and Unspecified diseases of blood and blood-forming organs. 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      Medications:    Prior to Admission medications    Not on File     Current Facility-Administered Medications   Medication Dose Route Frequency Provider Last Rate Last Dose    HYDROmorphone (DILAUDID) injection 0.5 mg  0.5 mg Intravenous Once Jhoan Live MD   Stopped at 03/24/19 0130    0.9 % sodium chloride infusion   Intravenous Continuous Cristóbal P Blood,  mL/hr at 03/24/19 2302      sodium chloride flush 0.9 % injection 10 mL  10 mL Intravenous 2 times per day RODDY Cedeno CNP        sodium chloride flush 0.9 % injection 10 mL  10 mL Intravenous PRN RODDY Cedeno CNP        magnesium hydroxide (MILK OF MAGNESIA) 400 MG/5ML suspension 30 mL  30 mL Oral Daily PRN RODDY Cedeno CNP        ondansetron (ZOFRAN) injection 4 mg  4 mg Intravenous Q6H PRN RODDY Cedeno CNP        nicotine (NICODERM CQ) 21 MG/24HR 1 patch  1 patch Transdermal Daily PRN RODDY Cedeno CNP        enoxaparin (LOVENOX) injection 40 mg  40 mg Subcutaneous Daily RODDY Cedeno CNP   40 mg at 03/24/19 1253    acetaminophen (TYLENOL) tablet 650 mg  650 mg Oral Q4H PRN RODDY Cedeno CNP        aspirin chewable tablet 324 mg  324 mg Oral Daily RODDY Cedeno CNP   324 mg at 03/24/19 0802    piperacillin-tazobactam (ZOSYN) 3.375 g in dextrose 5 % 50 mL IVPB (mini-bag)  3.375 g Intravenous Q8H RODDY Cedeno CNP 12.5 mL/hr at 03/25/19 0735 3.375 g at 03/25/19 0735    HYDROmorphone (DILAUDID) injection 1 mg  1 mg Intravenous Q3H PRN Cristóbal P Blood, DO   1 mg at 03/25/19 2473    oxyCODONE-acetaminophen (PERCOCET) 5-325 MG per tablet 1 tablet  1 tablet Oral Q4H PRN Cristóbal P Blood, DO        oxyCODONE-acetaminophen (PERCOCET) 5-325 MG per tablet 2 tablet  2 tablet Oral Q4H PRN Cristóbal P Blood, DO   2 tablet at 03/25/19 0528    azithromycin (ZITHROMAX) tablet 250 mg  250 mg Oral Daily Cristóbal Landon DO           Allergies:  Patient has no known allergies.     Social History: reports that he has been smoking cigarettes. He has a 2.00 pack-year smoking history. He does not have any smokeless tobacco history on file. He reports that he has current or past drug history. Frequency: 1.00 time per week. He reports that he does not drink alcohol. Family History: family history includes Sickle Cell Anemia in his brother. REVIEW OF SYSTEMS:      Constitutional: No fever or chills. No night sweats, no weight loss   Eyes: No eye discharge, double vision, or eye pain   HEENT: negative for sore mouth, sore throat, hoarseness and voice change   Respiratory: negative for cough , sputum, dyspnea, wheezing, hemoptysis, chest pain   Cardiovascular: negative for chest pain, dyspnea, palpitations, orthopnea, PND   Gastrointestinal: negative for nausea, vomiting, diarrhea, constipation, abdominal pain, Dysphagia, hematemesis and hematochezia   Genitourinary: negative for frequency, dysuria, nocturia, urinary incontinence, and hematuria   Integument: negative for rash, skin lesions, bruises. Hematologic/Lymphatic: negative for easy bruising, bleeding, lymphadenopathy, or petechiae   Endocrine: negative for heat or cold intolerance,weight changes, change in bowel habits and hair loss   Musculoskeletal:  Back pain and arm pain.   Neurological: negative for headaches, dizziness, seizures, weakness, numbness    PHYSICAL EXAM:        /62   Pulse 77   Temp 98 °F (36.7 °C) (Oral)   Resp 20   Ht 6' 2\" (1.88 m)   Wt 180 lb (81.6 kg)   SpO2 96%   BMI 23.11 kg/m²    Temp (24hrs), Av.1 °F (36.7 °C), Min:98 °F (36.7 °C), Max:98.4 °F (36.9 °C)      General appearance - well appearing, no in pain or distress   Mental status - alert and cooperative   Eyes - pupils equal and reactive, extraocular eye movements intact   Ears - bilateral TM's and external ear canals normal   Mouth - mucous membranes moist, pharynx normal without lesions   Neck - supple, no significant adenopathy   Lymphatics - no palpable lymphadenopathy, no hepatosplenomegaly   Chest -  decreased breathing sounds  Heart - normal rate, regular rhythm, normal S1, S2, no murmurs  Abdomen - soft, nontender, nondistended, no masses or organomegaly   Neurological - alert, oriented, normal speech, no focal findings or movement disorder noted   Musculoskeletal - no joint tenderness, deformity or swelling   Extremities - peripheral pulses normal, no pedal edema, no clubbing or cyanosis   Skin - normal coloration and turgor, no rashes, no suspicious skin lesions noted ,      DATA:      Labs:     CBC:   Recent Labs     03/24/19  0044 03/25/19  0642   WBC 14.5* 11.6*   HGB 12.4* 10.8*   HCT 33.2* 29.5*    196     BMP:   Recent Labs     03/24/19  0044 03/25/19  0642    139   K 3.7 3.4*   CO2 24 23   BUN 4* 3*   CREATININE 0.63* PENDING   LABGLOM >60 PENDING   GLUCOSE 86 95     Results for orders placed or performed during the hospital encounter of 03/23/19   CULTURE BLOOD #1   Result Value Ref Range    Specimen Description . BLOOD     Special Requests 10ML R AC     Culture NO GROWTH 17 HOURS    CULTURE BLOOD #2   Result Value Ref Range    Specimen Description . BLOOD     Special Requests 6ML R FA     Culture NO GROWTH 17 HOURS    Respiratory Virus PCR Panel   Result Value Ref Range    Specimen Description . NASOPHARYNGEAL SWAB     Adenovirus PCR Not Detected Not Detected    Coronavirus 229E PCR Not Detected Not Detected    Coronavirus HKU1 PCR Not Detected Not Detected    Coronavirus NL63 PCR Not Detected Not Detected    Coronavirus OC43 PCR Not Detected Not Detected    Human Metapneumovirus PCR Not Detected Not Detected    Rhino/Enterovirus PCR Not Detected Not Detected    Influenza A by PCR Not Detected Not Detected    Influenza A H1 PCR NOT REPORTED Not Detected    Influenza A H1 (2009) PCR NOT REPORTED Not Detected    Influenza A H3 PCR NOT REPORTED Not Detected    Influenza B by PCR Not Detected Not Detected    Parainfluenza 1 PCR Not mg/dL    Bun/Cre Ratio NOT REPORTED 9 - 20    Calcium 8.8 8.6 - 10.4 mg/dL    Sodium 135 135 - 144 mmol/L    Potassium 3.7 3.7 - 5.3 mmol/L    Chloride 102 98 - 107 mmol/L    CO2 24 20 - 31 mmol/L    Anion Gap 9 9 - 17 mmol/L    GFR Non-African American >60 >60 mL/min    GFR African American >60 >60 mL/min    GFR Comment          GFR Staging NOT REPORTED    TROP/MYOGLOBIN   Result Value Ref Range    Troponin, High Sensitivity <6 0 - 22 ng/L    Troponin T NOT REPORTED <0.03 ng/mL    Troponin Interp NOT REPORTED     Myoglobin 94 (H) 28 - 72 ng/mL   Procalcitonin   Result Value Ref Range    Procalcitonin 0.08 <0.09 ng/mL   Basic Metabolic Panel w/ Reflex to MG   Result Value Ref Range    Glucose 95 70 - 99 mg/dL    BUN 3 (L) 6 - 20 mg/dL    CREATININE PENDING mg/dL    Bun/Cre Ratio NOT REPORTED 9 - 20    Calcium 8.0 (L) 8.6 - 10.4 mg/dL    Sodium 139 135 - 144 mmol/L    Potassium 3.4 (L) 3.7 - 5.3 mmol/L    Chloride 104 98 - 107 mmol/L    CO2 23 20 - 31 mmol/L    Anion Gap 12 9 - 17 mmol/L    GFR Non-African American PENDING >60 mL/min    GFR  PENDING >60 mL/min    GFR Comment          GFR Staging NOT REPORTED    CBC   Result Value Ref Range    WBC 11.6 (H) 3.5 - 11.3 k/uL    RBC 3.43 (L) 4.21 - 5.77 m/uL    Hemoglobin 10.8 (L) 13.0 - 17.0 g/dL    Hematocrit 29.5 (L) 40.7 - 50.3 %    MCV 86.0 82.6 - 102.9 fL    MCH 31.5 25.2 - 33.5 pg    MCHC 36.6 (H) 28.4 - 34.8 g/dL    RDW 14.9 (H) 11.8 - 14.4 %    Platelets 079 147 - 435 k/uL    MPV 12.6 8.1 - 13.5 fL    NRBC Automated 0.6 (H) 0.0 per 100 WBC   EKG 12 Lead   Result Value Ref Range    Ventricular Rate 72 BPM    Atrial Rate 72 BPM    P-R Interval 162 ms    QRS Duration 92 ms    Q-T Interval 372 ms    QTc Calculation (Bazett) 407 ms    P Axis 68 degrees    R Axis 34 degrees    T Axis 61 degrees         IMAGING DATA:    Xr Chest Standard (2 Vw)    Result Date: 3/24/2019  EXAMINATION: TWO VIEWS OF THE CHEST 3/24/2019 12:24 am COMPARISON: None.  HISTORY: ORDERING SYSTEM PROVIDED HISTORY: rule out acute chest syndrome TECHNOLOGIST PROVIDED HISTORY: rule out acute chest syndrome Right-sided chest pain, chest tightness and difficulty breathing for the past 5 days. Prior studies including chest x-ray 10/31/2018 and CT chest 11/01/2018 FINDINGS: There is mild patchy airspace disease in the middle lobe and right lower lobe. There is adjacent pleural thickening and tenting of the right hemidiaphragm. Lungs are otherwise clear. Heart size and configuration are normal.     Right basilar pleural and parenchymal disease may be acute/recurrent. Residual scarring from the acute disease noted on 10/31/2018 is not excluded. Xr Shoulder Right (min 2 Views)    Result Date: 3/11/2019  EXAMINATION: 3 XRAY VIEWS OF THE RIGHT SHOULDER 3/11/2019 8:17 pm COMPARISON: None. HISTORY: ORDERING SYSTEM PROVIDED HISTORY: rt arm pain TECHNOLOGIST PROVIDED HISTORY: rt arm pain FINDINGS: 3 images of the right shoulder were provided. Alignment is normal. Flattening of the superolateral aspect of the right humeral head is noted raising the question of prior Hill-Sachs deformity. There is no acute fracture. Limited evaluation of the right lung demonstrates no focal abnormality     No acute abnormality. Ct Chest Pulmonary Embolism W Contrast    Result Date: 3/24/2019  EXAMINATION: CTA OF THE CHEST 3/24/2019 1:11 am TECHNIQUE: CTA of the chest was performed after the administration of intravenous contrast.  Multiplanar reformatted images are provided for review. MIP images are provided for review. Dose modulation, iterative reconstruction, and/or weight based adjustment of the mA/kV was utilized to reduce the radiation dose to as low as reasonably achievable. COMPARISON: 10/24/2018 and 11/01/2018 HISTORY: ORDERING SYSTEM PROVIDED HISTORY: right pleuritic pain.  sickle cell disease, previous PE TECHNOLOGIST PROVIDED HISTORY: Ordering Physician Provided Reason for Exam: Chest Pain (Pt has had symptoms since he was admited to hospital 3/2019) Acuity: Unknown Type of Exam: Unknown FINDINGS: Pulmonary Arteries: Pulmonary arteries are adequately opacified for evaluation. No evidence of intraluminal filling defect to suggest pulmonary embolism. Main pulmonary artery is normal in caliber. Mediastinum: No evidence of mediastinal lymphadenopathy. The heart and pericardium demonstrate no acute abnormality. There is no acute abnormality of the thoracic aorta. Lungs/pleura: There is mild patchy middle lobe and bilateral lower lobe airspace disease. There is trace right pleural fluid. No pneumothorax. Upper Abdomen: Limited images of the upper abdomen are remarkable for splenectomy. Soft Tissues/Bones: No acute bone or soft tissue abnormality. No evidence of an acute pulmonary embolus. Mild patchy middle lobe and bilateral lower lobe airspace disease. This may represent pneumonia and/or atelectasis. There is trace right pleural fluid. Vl Dup Upper Extremity Venous Right    Result Date: 3/12/2019    [facility]  Vascular Upper Extremities Veins Procedure   Patient Name  [facility]      Date of Study         03/12/2019                HEATHER SUBRAMANIAN   Date of Birth 1995  Gender                Male   Age           21 year(s)  Race                  Black   Room Number   2009   Corporate ID  [id number]  #   Patient [place]  [de-identified]  #   MR #          [id number]     Sonographer           Kristin Shoemaker   Accession #   [id number]   Interpreting          Shay Dailey                            Physician   Referring                 Referring Physician   Mariah Rose, *  Nurse  Practitioner  Procedure Type of Study:   Veins: Upper Extremities Veins, Venous Scan Upper Right. Indications for Study:Arm pain. Patient Status: In Patient. Conclusions   Summary   No evidence of superficial or deep venous thrombosis in the right upper  extremity.    Signature ----------------------------------------------------------------  Electronically signed by Yonis DaileyInterpreting physician)  on 03/12/2019 09:37 PM  ----------------------------------------------------------------  Findings:   Right Impression:  Right internal jugular, subclavian, axillary, brachial, ulnar, radial,  cephalic and basilic veins are compressible with normal doppler  responses. Risk Factors History +---------+----+-----------------------------------------------------------+ ! Diagnosis! Date! Comments                                                   ! +---------+----+-----------------------------------------------------------+ ! Other    ! ! Sickle Cell Anemia                                         ! +---------+----+-----------------------------------------------------------+ Velocities are measured in cm/s ; Diameters are measured in cm Right UE Vein Measurements 2D Measurements +------------------------------------+----------+---------------+----------+ ! Location                            ! Visualized! Compressibility! Thrombosis! +------------------------------------+----------+---------------+----------+ ! Prox IJV                            ! Yes       ! Yes            ! None      ! +------------------------------------+----------+---------------+----------+ ! Dist IJV                            ! Yes       ! Yes            ! None      ! +------------------------------------+----------+---------------+----------+ ! Prox SCV                            ! Yes       ! Yes            ! None      ! +------------------------------------+----------+---------------+----------+ ! Dist SCV                            ! Yes       ! Yes            ! None      ! +------------------------------------+----------+---------------+----------+ ! Prox Axillary                       ! Yes       ! Yes            ! None      ! +------------------------------------+----------+---------------+----------+ ! Dist Axillary !Yes       !Yes            ! None      ! +------------------------------------+----------+---------------+----------+ ! Prox Brachial                       !Yes       ! Yes            ! None      ! +------------------------------------+----------+---------------+----------+ ! Dist Brachial                       !Yes       ! Yes            ! None      ! +------------------------------------+----------+---------------+----------+ ! Prox Radial                         !Yes       ! Yes            ! None      ! +------------------------------------+----------+---------------+----------+ ! Dist Radial                         !Yes       ! Yes            ! None      ! +------------------------------------+----------+---------------+----------+ ! Prox Ulnar                          ! Yes       ! Yes            ! None      ! +------------------------------------+----------+---------------+----------+ ! Dist Ulnar                          ! Yes       ! Yes            ! None      ! +------------------------------------+----------+---------------+----------+ ! Basilic at UA                       ! Yes       ! Yes            ! None      ! +------------------------------------+----------+---------------+----------+ ! Basilic at AF                       ! Yes       ! Yes            ! None      ! +------------------------------------+----------+---------------+----------+ ! Basilic at [address]                       ! Yes       ! Yes            ! None      ! +------------------------------------+----------+---------------+----------+ ! Cephalic at UA                      ! Yes       ! Yes            ! None      ! +------------------------------------+----------+---------------+----------+ ! Cephalic at AF                      ! Yes       ! Yes            ! None      ! +------------------------------------+----------+---------------+----------+ ! Luan at [address]                      ! Yes       ! Yes            ! None      ! +------------------------------------+----------+---------------+----------+ Doppler Measurements +-------------------------+-----------------------+------------------------+ ! Location                 ! Signal                 !Reflux                  ! +-------------------------+-----------------------+------------------------+ ! IJV                      ! Phasic                 !                        ! +-------------------------+-----------------------+------------------------+ ! SCV                      ! Phasic                 !                        ! +-------------------------+-----------------------+------------------------+ ! Axillary                 ! Phasic                 !                        ! +-------------------------+-----------------------+------------------------+      Primary Problem  Acute chest syndrome [facility])    Active Hospital Problems    Diagnosis Date Noted    Acute chest syndrome ([facility] [D57.01] 03/24/2019    Tobacco abuse [Z72.0] 10/24/2018    Sickle cell anemia ([facility] [D57.1]          RECOMMENDATIONS:  I had a detailed discussion with the patient and personally went over results of his lab workup imaging studies are the relevant clinical data. We will obtained LDH and reticulocyte count bilirubin and hemoglobin tomorrow morning  Clinically patient is not tachycardic, tachypneic, hypoxic iron respiratory distress. He is ambulatory and goes outside to smoke regularly. More so patient has hemoglobin SC disease which is very rarely associated with acute chest syndrome. Clinically I do not suspect patient has acute chest syndrome. Recommend treatment for possible pneumonia versus atelectasis. Continue hydration. Continue pain control  Patient was also advised to use incentive spirometer  Patient was counseled on tobacco cessation      Discussed with patient and Nurse. Thank you for asking us to see this patient.           Zena Dockery MD        This note is created with the

## 2019-03-26 LAB
ABSOLUTE RETIC #: 0.19 M/UL (ref 0.03–0.08)
BILIRUB SERPL-MCNC: 3.84 MG/DL (ref 0.3–1.2)
IMMATURE RETIC FRACT: 24.3 % (ref 2.7–18.3)
LACTATE DEHYDROGENASE: 299 U/L (ref 135–225)
RETIC %: 5.6 % (ref 0.5–1.9)
RETIC HEMOGLOBIN: 31.9 PG (ref 28.2–35.7)

## 2019-03-26 PROCEDURE — 2580000003 HC RX 258: Performed by: INTERNAL MEDICINE

## 2019-03-26 PROCEDURE — 6370000000 HC RX 637 (ALT 250 FOR IP): Performed by: INTERNAL MEDICINE

## 2019-03-26 PROCEDURE — 1200000000 HC SEMI PRIVATE

## 2019-03-26 PROCEDURE — 82247 BILIRUBIN TOTAL: CPT

## 2019-03-26 PROCEDURE — 99232 SBSQ HOSP IP/OBS MODERATE 35: CPT | Performed by: INTERNAL MEDICINE

## 2019-03-26 PROCEDURE — 36415 COLL VENOUS BLD VENIPUNCTURE: CPT

## 2019-03-26 PROCEDURE — 85045 AUTOMATED RETICULOCYTE COUNT: CPT

## 2019-03-26 PROCEDURE — 6360000002 HC RX W HCPCS: Performed by: NURSE PRACTITIONER

## 2019-03-26 PROCEDURE — 6360000002 HC RX W HCPCS: Performed by: INTERNAL MEDICINE

## 2019-03-26 PROCEDURE — 6370000000 HC RX 637 (ALT 250 FOR IP): Performed by: NURSE PRACTITIONER

## 2019-03-26 PROCEDURE — 83615 LACTATE (LD) (LDH) ENZYME: CPT

## 2019-03-26 RX ADMIN — OXYCODONE HYDROCHLORIDE AND ACETAMINOPHEN 2 TABLET: 5; 325 TABLET ORAL at 08:35

## 2019-03-26 RX ADMIN — HYDROMORPHONE HYDROCHLORIDE 1 MG: 1 INJECTION, SOLUTION INTRAMUSCULAR; INTRAVENOUS; SUBCUTANEOUS at 11:17

## 2019-03-26 RX ADMIN — ASPIRIN 81 MG 324 MG: 81 TABLET ORAL at 08:35

## 2019-03-26 RX ADMIN — SODIUM CHLORIDE: 9 INJECTION, SOLUTION INTRAVENOUS at 16:53

## 2019-03-26 RX ADMIN — HYDROMORPHONE HYDROCHLORIDE 1 MG: 1 INJECTION, SOLUTION INTRAMUSCULAR; INTRAVENOUS; SUBCUTANEOUS at 19:07

## 2019-03-26 RX ADMIN — HYDROMORPHONE HYDROCHLORIDE 1 MG: 1 INJECTION, SOLUTION INTRAMUSCULAR; INTRAVENOUS; SUBCUTANEOUS at 06:46

## 2019-03-26 RX ADMIN — OXYCODONE HYDROCHLORIDE AND ACETAMINOPHEN 2 TABLET: 5; 325 TABLET ORAL at 04:40

## 2019-03-26 RX ADMIN — OXYCODONE HYDROCHLORIDE AND ACETAMINOPHEN 2 TABLET: 5; 325 TABLET ORAL at 12:53

## 2019-03-26 RX ADMIN — SODIUM CHLORIDE: 9 INJECTION, SOLUTION INTRAVENOUS at 00:46

## 2019-03-26 RX ADMIN — HYDROMORPHONE HYDROCHLORIDE 1 MG: 1 INJECTION, SOLUTION INTRAMUSCULAR; INTRAVENOUS; SUBCUTANEOUS at 00:23

## 2019-03-26 RX ADMIN — HYDROMORPHONE HYDROCHLORIDE 1 MG: 1 INJECTION, SOLUTION INTRAMUSCULAR; INTRAVENOUS; SUBCUTANEOUS at 23:03

## 2019-03-26 RX ADMIN — HYDROMORPHONE HYDROCHLORIDE 1 MG: 1 INJECTION, SOLUTION INTRAMUSCULAR; INTRAVENOUS; SUBCUTANEOUS at 14:59

## 2019-03-26 RX ADMIN — ENOXAPARIN SODIUM 40 MG: 40 INJECTION SUBCUTANEOUS at 08:36

## 2019-03-26 RX ADMIN — OXYCODONE HYDROCHLORIDE AND ACETAMINOPHEN 2 TABLET: 5; 325 TABLET ORAL at 16:52

## 2019-03-26 RX ADMIN — OXYCODONE HYDROCHLORIDE AND ACETAMINOPHEN 2 TABLET: 5; 325 TABLET ORAL at 21:06

## 2019-03-26 ASSESSMENT — PAIN DESCRIPTION - DIRECTION
RADIATING_TOWARDS: RIGHT SHOULDER/ARM

## 2019-03-26 ASSESSMENT — PAIN DESCRIPTION - PAIN TYPE
TYPE: ACUTE PAIN

## 2019-03-26 ASSESSMENT — PAIN DESCRIPTION - FREQUENCY
FREQUENCY: CONTINUOUS

## 2019-03-26 ASSESSMENT — PAIN SCALES - GENERAL
PAINLEVEL_OUTOF10: 7
PAINLEVEL_OUTOF10: 5
PAINLEVEL_OUTOF10: 7
PAINLEVEL_OUTOF10: 7
PAINLEVEL_OUTOF10: 8
PAINLEVEL_OUTOF10: 7
PAINLEVEL_OUTOF10: 5
PAINLEVEL_OUTOF10: 8
PAINLEVEL_OUTOF10: 8
PAINLEVEL_OUTOF10: 5
PAINLEVEL_OUTOF10: 7

## 2019-03-26 ASSESSMENT — PAIN DESCRIPTION - ORIENTATION
ORIENTATION: RIGHT

## 2019-03-26 ASSESSMENT — PAIN DESCRIPTION - LOCATION
LOCATION: CHEST

## 2019-03-26 ASSESSMENT — PAIN DESCRIPTION - ONSET
ONSET: ON-GOING

## 2019-03-26 ASSESSMENT — PAIN DESCRIPTION - PROGRESSION
CLINICAL_PROGRESSION: NOT CHANGED

## 2019-03-26 ASSESSMENT — PAIN DESCRIPTION - DESCRIPTORS
DESCRIPTORS: ACHING;CONSTANT

## 2019-03-26 ASSESSMENT — PAIN - FUNCTIONAL ASSESSMENT
PAIN_FUNCTIONAL_ASSESSMENT: ACTIVITIES ARE NOT PREVENTED

## 2019-03-26 NOTE — PLAN OF CARE
Problem: Infection:  Goal: Will remain free from infection  Description  Will remain free from infection  Outcome: Ongoing     Problem: Safety:  Goal: Free from accidental physical injury  Description  Free from accidental physical injury  Outcome: Ongoing  Goal: Free from intentional harm  Description  Free from intentional harm  Outcome: Ongoing     Problem: Daily Care:  Goal: Daily care needs are met  Description  Daily care needs are met  Outcome: Ongoing     Problem: Pain:  Goal: Patient's pain/discomfort is manageable  Description  Patient's pain/discomfort is manageable  Outcome: Ongoing  Goal: Pain level will decrease  Description  Pain level will decrease  Outcome: Ongoing  Goal: Control of acute pain  Description  Control of acute pain  Outcome: Ongoing  Goal: Control of chronic pain  Description  Control of chronic pain  Outcome: Ongoing     Problem: Discharge Planning:  Goal: Patients continuum of care needs are met  Description  Patients continuum of care needs are met  Outcome: Ongoing

## 2019-03-26 NOTE — PLAN OF CARE
Problem: Infection:  Goal: Will remain free from infection  Description  Will remain free from infection  3/26/2019 1844 by Tessa Hutson RN  Outcome: Ongoing  3/26/2019 0511 by Naga Reynoso RN  Outcome: Ongoing     Problem: Safety:  Goal: Free from accidental physical injury  Description  Free from accidental physical injury  3/26/2019 1844 by Tessa Hutson RN  Outcome: Ongoing  3/26/2019 0511 by Naga Reynoso RN  Outcome: Ongoing  Goal: Free from intentional harm  Description  Free from intentional harm  3/26/2019 1844 by Tessa Hutson RN  Outcome: Ongoing  3/26/2019 0511 by Naga Reynoso RN  Outcome: Ongoing     Problem: Daily Care:  Goal: Daily care needs are met  Description  Daily care needs are met  3/26/2019 1844 by Tessa Hutson RN  Outcome: Ongoing  3/26/2019 0511 by Naga Reynoso RN  Outcome: Ongoing     Problem: Pain:  Goal: Patient's pain/discomfort is manageable  Description  Patient's pain/discomfort is manageable  3/26/2019 1844 by Tessa Hutson RN  Outcome: Ongoing  3/26/2019 0511 by Naga Reynoso RN  Outcome: Ongoing  Goal: Pain level will decrease  Description  Pain level will decrease  3/26/2019 1844 by Tessa Hutson RN  Outcome: Ongoing  3/26/2019 0511 by Naga Reynoso RN  Outcome: Ongoing  Goal: Control of acute pain  Description  Control of acute pain  3/26/2019 1844 by Tessa Hutson RN  Outcome: Ongoing  3/26/2019 0511 by Naga Reynoso RN  Outcome: Ongoing  Goal: Control of chronic pain  Description  Control of chronic pain  3/26/2019 1844 by Tessa Hutson RN  Outcome: Ongoing  3/26/2019 0511 by Naga Reynoso RN  Outcome: Ongoing     Problem: Discharge Planning:  Goal: Patients continuum of care needs are met  Description  Patients continuum of care needs are met  3/26/2019 1844 by Tessa Hutson RN  Outcome: Ongoing  3/26/2019 0511 by Naga Reynoso RN  Outcome: Ongoing

## 2019-03-26 NOTE — PROGRESS NOTES
Facility-Administered Medications   Medication Dose Route Frequency Provider Last Rate Last Dose    [START ON 3/27/2019] influenza quadrivalent split vaccine (FLUZONE;FLUARIX;FLULAVAL;AFLURIA) injection 0.5 mL  0.5 mL Intramuscular Once Dillon Banks MD        HYDROmorphone (DILAUDID) injection 0.5 mg  0.5 mg Intravenous Once Woodrow Guy MD   Stopped at 03/24/19 0130    0.9 % sodium chloride infusion   Intravenous Continuous Cristóbal P Blood,  mL/hr at 03/26/19 0046      sodium chloride flush 0.9 % injection 10 mL  10 mL Intravenous 2 times per day RODDY Cedeno - CNP        sodium chloride flush 0.9 % injection 10 mL  10 mL Intravenous PRN Myriam Cruz APRN - CNP        magnesium hydroxide (MILK OF MAGNESIA) 400 MG/5ML suspension 30 mL  30 mL Oral Daily PRN Myriam Cruz APRN - CNP        ondansetron (ZOFRAN) injection 4 mg  4 mg Intravenous Q6H PRN Myriam Cruz APRN - CNP        nicotine (NICODERM CQ) 21 MG/24HR 1 patch  1 patch Transdermal Daily PRN Myriam Cruz APRN - CNP        enoxaparin (LOVENOX) injection 40 mg  40 mg Subcutaneous Daily RODDY Cedeno - CNP   40 mg at 03/25/19 0852    acetaminophen (TYLENOL) tablet 650 mg  650 mg Oral Q4H PRN Myriam Cruz APRN - CNP        aspirin chewable tablet 324 mg  324 mg Oral Daily Myriam RODDY Gutierres - CNP   324 mg at 03/25/19 0852    HYDROmorphone (DILAUDID) injection 1 mg  1 mg Intravenous Q3H PRN Cristóbal P Blood, DO   1 mg at 03/26/19 0646    oxyCODONE-acetaminophen (PERCOCET) 5-325 MG per tablet 1 tablet  1 tablet Oral Q4H PRN Cristóbal P Blood, DO        oxyCODONE-acetaminophen (PERCOCET) 5-325 MG per tablet 2 tablet  2 tablet Oral Q4H PRN Cristóbal P Blood, DO   2 tablet at 03/26/19 0440       Allergies:  Patient has no known allergies. Social History:   reports that he has been smoking cigarettes. He has a 2.00 pack-year smoking history.  He does not have any smokeless tobacco history on file. He reports that he has current or past drug history. Frequency: 1.00 time per week. He reports that he does not drink alcohol. Family History: family history includes Sickle Cell Anemia in his brother. REVIEW OF SYSTEMS:      Constitutional: No fever or chills. No night sweats, no weight loss   Eyes: No eye discharge, double vision, or eye pain   HEENT: negative for sore mouth, sore throat, hoarseness and voice change   Respiratory: negative for cough , sputum, dyspnea, wheezing, hemoptysis, chest pain   Cardiovascular: negative for chest pain, dyspnea, palpitations, orthopnea, PND   Gastrointestinal: negative for nausea, vomiting, diarrhea, constipation, abdominal pain, Dysphagia, hematemesis and hematochezia   Genitourinary: negative for frequency, dysuria, nocturia, urinary incontinence, and hematuria   Integument: negative for rash, skin lesions, bruises. Hematologic/Lymphatic: negative for easy bruising, bleeding, lymphadenopathy, or petechiae   Endocrine: negative for heat or cold intolerance,weight changes, change in bowel habits and hair loss   Musculoskeletal:  Back pain and arm pain.   Neurological: negative for headaches, dizziness, seizures, weakness, numbness    PHYSICAL EXAM:        /71   Pulse 86   Temp 98.4 °F (36.9 °C) (Oral)   Resp 16   Ht 6' 2\" (1.88 m)   Wt 193 lb 7 oz (87.7 kg)   SpO2 96%   BMI 24.84 kg/m²    Temp (24hrs), Av.1 °F (36.7 °C), Min:97.8 °F (36.6 °C), Max:98.4 °F (36.9 °C)      General appearance - well appearing, no in pain or distress   Mental status - alert and cooperative   Eyes - pupils equal and reactive, extraocular eye movements intact   Ears - bilateral TM's and external ear canals normal   Mouth - mucous membranes moist, pharynx normal without lesions   Neck - supple, no significant adenopathy   Lymphatics - no palpable lymphadenopathy, no hepatosplenomegaly   Chest -  decreased breathing sounds  Heart - normal rate, regular rhythm, normal S1, S2, no murmurs  Abdomen - soft, nontender, nondistended, no masses or organomegaly   Neurological - alert, oriented, normal speech, no focal findings or movement disorder noted   Musculoskeletal - no joint tenderness, deformity or swelling   Extremities - peripheral pulses normal, no pedal edema, no clubbing or cyanosis   Skin - normal coloration and turgor, no rashes, no suspicious skin lesions noted ,      DATA:      Labs:     CBC:   Recent Labs     03/24/19  0044 03/25/19  0642   WBC 14.5* 11.6*   HGB 12.4* 10.8*   HCT 33.2* 29.5*    196     BMP:   Recent Labs     03/24/19  0044 03/25/19  0642    139   K 3.7 3.4*   CO2 24 23   BUN 4* 3*   CREATININE 0.63* 0.60*   LABGLOM >60 >60   GLUCOSE 86 95     Results for orders placed or performed during the hospital encounter of 03/23/19   CULTURE BLOOD #1   Result Value Ref Range    Specimen Description . BLOOD     Special Requests 10ML R AC     Culture (A)      POSITIVE Blood Culture  Results called to and read back by:  Phillip Callahan. ON 3/26/19 AT 0246      Culture       DIRECT GRAM STAIN FROM BOTTLE:  GRAM POSITIVE COCCI IN CLUSTERS     CULTURE BLOOD #2   Result Value Ref Range    Specimen Description . BLOOD     Special Requests 6ML R FA     Culture NO GROWTH 1 DAY    Respiratory Virus PCR Panel   Result Value Ref Range    Specimen Description . NASOPHARYNGEAL SWAB     Adenovirus PCR Not Detected Not Detected    Coronavirus 229E PCR Not Detected Not Detected    Coronavirus HKU1 PCR Not Detected Not Detected    Coronavirus NL63 PCR Not Detected Not Detected    Coronavirus OC43 PCR Not Detected Not Detected    Human Metapneumovirus PCR Not Detected Not Detected    Rhino/Enterovirus PCR Not Detected Not Detected    Influenza A by PCR Not Detected Not Detected    Influenza A H1 PCR NOT REPORTED Not Detected    Influenza A H1 (2009) PCR NOT REPORTED Not Detected    Influenza A H3 PCR NOT REPORTED Not Detected    Influenza B by PCR Not Detected Not Detected    Parainfluenza 1 PCR Not Detected Not Detected    Parainfluenza 2 PCR Not Detected Not Detected    Parainfluenza 3 PCR Not Detected Not Detected    Parainfluenza 4 PCR Not Detected Not Detected    Resp Syncytial Virus PCR Not Detected Not Detected    B Pertussis by PCR Not Detected Not Detected    Chlamydia pneumoniae By PCR Not Detected Not Detected    Mycoplasma pneumo by PCR Not Detected Not Detected   CBC WITH AUTO DIFFERENTIAL   Result Value Ref Range    WBC 14.5 (H) 3.5 - 11.3 k/uL    RBC 3.91 (L) 4.21 - 5.77 m/uL    Hemoglobin 12.4 (L) 13.0 - 17.0 g/dL    Hematocrit 33.2 (L) 40.7 - 50.3 %    MCV 84.9 82.6 - 102.9 fL    MCH 31.7 25.2 - 33.5 pg    MCHC 37.3 (H) 28.4 - 34.8 g/dL    RDW 14.6 (H) 11.8 - 14.4 %    Platelets 289 835 - 232 k/uL    MPV 12.2 8.1 - 13.5 fL    NRBC Automated 0.9 (H) 0.0 per 100 WBC    Differential Type NOT REPORTED     WBC Morphology NOT REPORTED     RBC Morphology NOT REPORTED     Platelet Estimate NOT REPORTED     Immature Granulocytes 0 0 %    Seg Neutrophils 68 (H) 36 - 66 %    Lymphocytes 23 (L) 24 - 44 %    Monocytes 8 (H) 1 - 7 %    Eosinophils % 0 (L) 1 - 4 %    Basophils 1 0 - 2 %    nRBC 1 (H) 0 per 100 WBC    Absolute Immature Granulocyte 0.00 0.00 - 0.30 k/uL    Segs Absolute 9.85 (H) 1.8 - 7.7 k/uL    Absolute Lymph # 3.34 1.0 - 4.8 k/uL    Absolute Mono # 1.16 (H) 0.1 - 0.8 k/uL    Absolute Eos # 0.00 0.0 - 0.4 k/uL    Basophils # 0.15 0.0 - 0.2 k/uL    Morphology ANISOCYTOSIS PRESENT     Morphology 2+ TARGET CELLS     Morphology 1+ POLYCHROMASIA     Morphology SICKLE CELLS PRESENT    LACTATE DEHYDROGENASE   Result Value Ref Range     (H) 135 - 225 U/L   Reticulocytes   Result Value Ref Range    Retic % 5.7 (H) 0.5 - 1.9 %    Absolute Retic # 0.220 (H) 0.030 - 0.080 M/uL    Immature Retic Fract 29.400 (H) 2.7 - 18.3 %    Retic Hemoglobin 33.4 28.2 - 35.7 pg   Basic Metabolic Panel   Result Value Ref Range    Glucose 86 70 - 99 mg/dL    BUN 4 Magnesium 1.9 1.6 - 2.6 mg/dL   Lactate Dehydrogenase   Result Value Ref Range     (H) 135 - 225 U/L   Reticulocytes   Result Value Ref Range    Retic % 5.0 (H) 0.5 - 1.9 %    Absolute Retic # 0.170 (H) 0.030 - 0.080 M/uL    Immature Retic Fract 25.800 (H) 2.7 - 18.3 %    Retic Hemoglobin 31.2 28.2 - 35.7 pg   BILIRUBIN, TOTAL   Result Value Ref Range    Total Bilirubin 3.84 (H) 0.3 - 1.2 mg/dL   RETICULOCYTES   Result Value Ref Range    Retic % 5.6 (H) 0.5 - 1.9 %    Absolute Retic # 0.190 (H) 0.030 - 0.080 M/uL    Immature Retic Fract 24.300 (H) 2.7 - 18.3 %    Retic Hemoglobin 31.9 28.2 - 35.7 pg   EKG 12 Lead   Result Value Ref Range    Ventricular Rate 72 BPM    Atrial Rate 72 BPM    P-R Interval 162 ms    QRS Duration 92 ms    Q-T Interval 372 ms    QTc Calculation (Bazett) 407 ms    P Axis 68 degrees    R Axis 34 degrees    T Axis 61 degrees         IMAGING DATA:    Xr Chest Standard (2 Vw)    Result Date: 3/24/2019  EXAMINATION: TWO VIEWS OF THE CHEST 3/24/2019 12:24 am COMPARISON: None. HISTORY: ORDERING SYSTEM PROVIDED HISTORY: rule out acute chest syndrome TECHNOLOGIST PROVIDED HISTORY: rule out acute chest syndrome Right-sided chest pain, chest tightness and difficulty breathing for the past 5 days. Prior studies including chest x-ray 10/31/2018 and CT chest 11/01/2018 FINDINGS: There is mild patchy airspace disease in the middle lobe and right lower lobe. There is adjacent pleural thickening and tenting of the right hemidiaphragm. Lungs are otherwise clear. Heart size and configuration are normal.     Right basilar pleural and parenchymal disease may be acute/recurrent. Residual scarring from the acute disease noted on 10/31/2018 is not excluded. Xr Shoulder Right (min 2 Views)    Result Date: 3/11/2019  EXAMINATION: 3 XRAY VIEWS OF THE RIGHT SHOULDER 3/11/2019 8:17 pm COMPARISON: None.  HISTORY: ORDERING SYSTEM PROVIDED HISTORY: rt arm pain TECHNOLOGIST PROVIDED HISTORY: rt arm pain FINDINGS: 3 images of the right shoulder were provided. Alignment is normal. Flattening of the superolateral aspect of the right humeral head is noted raising the question of prior Hill-Sachs deformity. There is no acute fracture. Limited evaluation of the right lung demonstrates no focal abnormality     No acute abnormality. Ct Chest Pulmonary Embolism W Contrast    Result Date: 3/24/2019  EXAMINATION: CTA OF THE CHEST 3/24/2019 1:11 am TECHNIQUE: CTA of the chest was performed after the administration of intravenous contrast.  Multiplanar reformatted images are provided for review. MIP images are provided for review. Dose modulation, iterative reconstruction, and/or weight based adjustment of the mA/kV was utilized to reduce the radiation dose to as low as reasonably achievable. COMPARISON: 10/24/2018 and 11/01/2018 HISTORY: ORDERING SYSTEM PROVIDED HISTORY: right pleuritic pain. sickle cell disease, previous PE TECHNOLOGIST PROVIDED HISTORY: Ordering Physician Provided Reason for Exam: Chest Pain (Pt has had symptoms since he was admited to hospital 3/2019) Acuity: Unknown Type of Exam: Unknown FINDINGS: Pulmonary Arteries: Pulmonary arteries are adequately opacified for evaluation. No evidence of intraluminal filling defect to suggest pulmonary embolism. Main pulmonary artery is normal in caliber. Mediastinum: No evidence of mediastinal lymphadenopathy. The heart and pericardium demonstrate no acute abnormality. There is no acute abnormality of the thoracic aorta. Lungs/pleura: There is mild patchy middle lobe and bilateral lower lobe airspace disease. There is trace right pleural fluid. No pneumothorax. Upper Abdomen: Limited images of the upper abdomen are remarkable for splenectomy. Soft Tissues/Bones: No acute bone or soft tissue abnormality. No evidence of an acute pulmonary embolus. Mild patchy middle lobe and bilateral lower lobe airspace disease.   This may represent pneumonia and/or !Yes       !Yes            ! None      ! +------------------------------------+----------+---------------+----------+ ! Basilic at UA                       ! Yes       ! Yes            ! None      ! +------------------------------------+----------+---------------+----------+ ! Basilic at AF                       ! Yes       ! Yes            ! None      ! +------------------------------------+----------+---------------+----------+ ! Basilic at [address]                       ! Yes       ! Yes            ! None      ! +------------------------------------+----------+---------------+----------+ ! Cephalic at UA                      ! Yes       ! Yes            ! None      ! +------------------------------------+----------+---------------+----------+ ! Cephalic at AF                      ! Yes       ! Yes            ! None      ! +------------------------------------+----------+---------------+----------+ ! Venuic at [address]                      ! Yes       ! Yes            ! None      ! +------------------------------------+----------+---------------+----------+ Doppler Measurements +-------------------------+-----------------------+------------------------+ ! Location                 ! Signal                 !Reflux                  ! +-------------------------+-----------------------+------------------------+ ! IJV                      ! Phasic                 !                        ! +-------------------------+-----------------------+------------------------+ ! SCV                      ! Phasic                 !                        ! +-------------------------+-----------------------+------------------------+ ! Axillary                 ! Phasic                 !                        ! +-------------------------+-----------------------+------------------------+      Primary Problem  Acute chest syndrome [facility])    Active Hospital Problems    Diagnosis Date Noted    Acute chest syndrome ([facility] [D57.01] 03/24/2019    Tobacco abuse [Z72.0]

## 2019-03-26 NOTE — PROGRESS NOTES
[facility]    Progress Note    3/26/2019    9:16 AM    Name:   Minerva Narvaez  MRN:     [id number]     Acct:      [de-identified]   Room:   [address] [place] [address] Day:  2  Admit Date:  3/23/2019 11:56 PM    PCP:   No primary care provider on file. Code Status:  Full Code    Subjective:     C/C:   Chief Complaint   Patient presents with    Chest Pain     Pt has had symptoms since he was admited to hospital 3/2019    Arm Pain    Shortness of Breath     Interval History Status: not changed. No acute issues overnight  No current complaints  Walking around floor  Still having chest pain   Hemolysis labs stable        Brief History:     The patient is a 21 y.o.  AA male who presents with Chest Pain (Pt has had symptoms since he was admited to hospital 3/2019); Arm Pain; and Shortness of Breath   and he is admitted to the hospital for the management of  Cp and shoulder pain. He had the shoulder pain 1st and was hospitalized for sickle crisis earlier this month. Few days after discharge he developed constant pleuritic cp. Took motrin without relief and ran out of percocet from last admission. Denies f/c/s/s/cough. Review of Systems:     Constitutional:  negative for chills, fevers, sweats  Respiratory:  negative for cough, dyspnea on exertion, hemoptysis, shortness of breath, wheezing  Cardiovascular:  Positive for chest pain  Gastrointestinal:  negative for abdominal pain, constipation, diarrhea, nausea, vomiting  Neurological:  negative for dizziness, headache    Medications:      Allergies:  No Known Allergies    Current Meds:   Scheduled Meds:    [START ON 3/27/2019] influenza virus vaccine  0.5 mL Intramuscular Once    HYDROmorphone  0.5 mg Intravenous Once    sodium chloride flush  10 mL Intravenous 2 times per day    enoxaparin  40 mg Subcutaneous Daily    aspirin  324 mg Oral Daily     Continuous Infusions:    sodium chloride 125 mL/hr at 19 0046     PRN Meds: sodium chloride flush, magnesium hydroxide, ondansetron, nicotine, acetaminophen, HYDROmorphone, oxyCODONE-acetaminophen, oxyCODONE-acetaminophen    Data:     Past Medical History:   has a past medical history of Chest pain, History of blood transfusion, Sickle cell anemia ([facility] Sickle cell disease, type SC ([facility] and Unspecified diseases of blood and blood-forming organs. Social History:   reports that he has been smoking cigarettes. He has a 2.00 pack-year smoking history. He does not have any smokeless tobacco history on file. He reports that he has current or past drug history. Frequency: 1.00 time per week. He reports that he does not drink alcohol. Family History:   Family History   Problem Relation Age of Onset    Sickle Cell Anemia Brother        Vitals:  BP (!) 144/82   Pulse 93   Temp 98.7 °F (37.1 °C) (Oral)   Resp 18   Ht 6' 2\" (1.88 m)   Wt 193 lb 7 oz (87.7 kg)   SpO2 96%   BMI 24.84 kg/m²   Temp (24hrs), Av.6 °F (37 °C), Min:98.4 °F (36.9 °C), Max:98.7 °F (37.1 °C)    No results for input(s): POCGLU in the last 72 hours. I/O (24Hr):     Intake/Output Summary (Last 24 hours) at 3/26/2019 0916  Last data filed at 3/26/2019 0700  Gross per 24 hour   Intake 3137 ml   Output --   Net 3137 ml       Labs:    Hematology:  Recent Labs     19  0044 19  0642   WBC 14.5* 11.6*   RBC 3.91* 3.43*   HGB 12.4* 10.8*   HCT 33.2* 29.5*   MCV 84.9 86.0   MCH 31.7 31.5   MCHC 37.3* 36.6*   RDW 14.6* 14.9*    196   MPV 12.2 12.6     Chemistry:  Recent Labs     19  0044 19  0611 19  0642     --  139   K 3.7  --  3.4*     --  104   CO2 24  --  23   GLUCOSE 86  --  95   BUN 4*  --  3*   CREATININE 0.63*  --  0.60*   MG  --   --  1.9   ANIONGAP 9  --  12   LABGLOM >60  --  >60   GFRAA >60  --  >60   CALCIUM 8.8  --  8.0*   TROPHS  --  <6  --    MYOGLOBIN  --  94*  --      Recent Labs     19  0044 19  8547

## 2019-03-27 PROBLEM — D57.00 SICKLE CELL CRISIS (HCC): Status: ACTIVE | Noted: 2019-03-27

## 2019-03-27 LAB
ABSOLUTE RETIC #: 0.16 M/UL (ref 0.03–0.08)
BILIRUB SERPL-MCNC: 3.34 MG/DL (ref 0.3–1.2)
IMMATURE RETIC FRACT: 24.1 % (ref 2.7–18.3)
LACTATE DEHYDROGENASE: 330 U/L (ref 135–225)
LV EF: 55 %
LVEF MODALITY: NORMAL
RETIC %: 4.8 % (ref 0.5–1.9)
RETIC HEMOGLOBIN: 30 PG (ref 28.2–35.7)

## 2019-03-27 PROCEDURE — 6360000002 HC RX W HCPCS: Performed by: INTERNAL MEDICINE

## 2019-03-27 PROCEDURE — 99254 IP/OBS CNSLTJ NEW/EST MOD 60: CPT | Performed by: INTERNAL MEDICINE

## 2019-03-27 PROCEDURE — 99232 SBSQ HOSP IP/OBS MODERATE 35: CPT | Performed by: INTERNAL MEDICINE

## 2019-03-27 PROCEDURE — 6360000002 HC RX W HCPCS: Performed by: NURSE PRACTITIONER

## 2019-03-27 PROCEDURE — 6370000000 HC RX 637 (ALT 250 FOR IP): Performed by: NURSE PRACTITIONER

## 2019-03-27 PROCEDURE — 87040 BLOOD CULTURE FOR BACTERIA: CPT

## 2019-03-27 PROCEDURE — 6370000000 HC RX 637 (ALT 250 FOR IP): Performed by: INTERNAL MEDICINE

## 2019-03-27 PROCEDURE — 36415 COLL VENOUS BLD VENIPUNCTURE: CPT

## 2019-03-27 PROCEDURE — 93306 TTE W/DOPPLER COMPLETE: CPT

## 2019-03-27 PROCEDURE — 2580000003 HC RX 258: Performed by: INTERNAL MEDICINE

## 2019-03-27 PROCEDURE — 86738 MYCOPLASMA ANTIBODY: CPT

## 2019-03-27 PROCEDURE — 1200000000 HC SEMI PRIVATE

## 2019-03-27 PROCEDURE — 83615 LACTATE (LD) (LDH) ENZYME: CPT

## 2019-03-27 PROCEDURE — 85045 AUTOMATED RETICULOCYTE COUNT: CPT

## 2019-03-27 PROCEDURE — 82247 BILIRUBIN TOTAL: CPT

## 2019-03-27 RX ADMIN — CEFTRIAXONE SODIUM 1 G: 1 INJECTION, POWDER, FOR SOLUTION INTRAMUSCULAR; INTRAVENOUS at 17:47

## 2019-03-27 RX ADMIN — ASPIRIN 81 MG 324 MG: 81 TABLET ORAL at 08:39

## 2019-03-27 RX ADMIN — HYDROMORPHONE HYDROCHLORIDE 1 MG: 1 INJECTION, SOLUTION INTRAMUSCULAR; INTRAVENOUS; SUBCUTANEOUS at 19:22

## 2019-03-27 RX ADMIN — OXYCODONE HYDROCHLORIDE AND ACETAMINOPHEN 2 TABLET: 5; 325 TABLET ORAL at 21:54

## 2019-03-27 RX ADMIN — OXYCODONE HYDROCHLORIDE AND ACETAMINOPHEN 2 TABLET: 5; 325 TABLET ORAL at 17:47

## 2019-03-27 RX ADMIN — HYDROMORPHONE HYDROCHLORIDE 1 MG: 1 INJECTION, SOLUTION INTRAMUSCULAR; INTRAVENOUS; SUBCUTANEOUS at 22:48

## 2019-03-27 RX ADMIN — HYDROMORPHONE HYDROCHLORIDE 1 MG: 1 INJECTION, SOLUTION INTRAMUSCULAR; INTRAVENOUS; SUBCUTANEOUS at 15:44

## 2019-03-27 RX ADMIN — HYDROMORPHONE HYDROCHLORIDE 1 MG: 1 INJECTION, SOLUTION INTRAMUSCULAR; INTRAVENOUS; SUBCUTANEOUS at 08:39

## 2019-03-27 RX ADMIN — OXYCODONE HYDROCHLORIDE AND ACETAMINOPHEN 2 TABLET: 5; 325 TABLET ORAL at 09:36

## 2019-03-27 RX ADMIN — ENOXAPARIN SODIUM 40 MG: 40 INJECTION SUBCUTANEOUS at 08:48

## 2019-03-27 RX ADMIN — OXYCODONE HYDROCHLORIDE AND ACETAMINOPHEN 2 TABLET: 5; 325 TABLET ORAL at 05:35

## 2019-03-27 RX ADMIN — SODIUM CHLORIDE: 9 INJECTION, SOLUTION INTRAVENOUS at 01:32

## 2019-03-27 RX ADMIN — OXYCODONE HYDROCHLORIDE AND ACETAMINOPHEN 2 TABLET: 5; 325 TABLET ORAL at 13:47

## 2019-03-27 RX ADMIN — HYDROMORPHONE HYDROCHLORIDE 1 MG: 1 INJECTION, SOLUTION INTRAMUSCULAR; INTRAVENOUS; SUBCUTANEOUS at 02:43

## 2019-03-27 RX ADMIN — HYDROMORPHONE HYDROCHLORIDE 1 MG: 1 INJECTION, SOLUTION INTRAMUSCULAR; INTRAVENOUS; SUBCUTANEOUS at 12:13

## 2019-03-27 RX ADMIN — OXYCODONE HYDROCHLORIDE AND ACETAMINOPHEN 2 TABLET: 5; 325 TABLET ORAL at 01:35

## 2019-03-27 ASSESSMENT — PAIN SCALES - GENERAL
PAINLEVEL_OUTOF10: 7
PAINLEVEL_OUTOF10: 8
PAINLEVEL_OUTOF10: 8
PAINLEVEL_OUTOF10: 7
PAINLEVEL_OUTOF10: 8
PAINLEVEL_OUTOF10: 7
PAINLEVEL_OUTOF10: 8
PAINLEVEL_OUTOF10: 7
PAINLEVEL_OUTOF10: 9
PAINLEVEL_OUTOF10: 7
PAINLEVEL_OUTOF10: 7

## 2019-03-27 ASSESSMENT — PAIN DESCRIPTION - FREQUENCY
FREQUENCY: CONTINUOUS
FREQUENCY: CONTINUOUS

## 2019-03-27 ASSESSMENT — PAIN DESCRIPTION - PROGRESSION
CLINICAL_PROGRESSION: NOT CHANGED
CLINICAL_PROGRESSION: NOT CHANGED

## 2019-03-27 ASSESSMENT — PAIN DESCRIPTION - LOCATION
LOCATION: CHEST
LOCATION: CHEST

## 2019-03-27 ASSESSMENT — PAIN DESCRIPTION - ORIENTATION
ORIENTATION: RIGHT
ORIENTATION: RIGHT

## 2019-03-27 ASSESSMENT — PAIN DESCRIPTION - PAIN TYPE
TYPE: ACUTE PAIN
TYPE: ACUTE PAIN

## 2019-03-27 ASSESSMENT — PAIN DESCRIPTION - DESCRIPTORS
DESCRIPTORS: ACHING;CONSTANT
DESCRIPTORS: ACHING;CONSTANT

## 2019-03-27 ASSESSMENT — PAIN DESCRIPTION - DIRECTION
RADIATING_TOWARDS: RIGHT SHOULDER/ARM
RADIATING_TOWARDS: RIGHT SHOULDER/ARM

## 2019-03-27 ASSESSMENT — PAIN DESCRIPTION - ONSET
ONSET: ON-GOING
ONSET: ON-GOING

## 2019-03-27 NOTE — CONSULTS
[facility] - Initial Consult Note  Today's Date and Time: 3/27/2019, 8:54 AM    Impression :   1. Viridians streptococci bacteremia (1/2) on 3-24-19  2. Dental caries  3. Hb Sc disease  4. Acute chest syndrome (vaso occlusive pain crisis)  5. CT chest 3-24-19 with residual changes from prior RLL pneumonia 11-1-18    Recommendations:   · Repeat blood cultures x 3 , one hr apart  · Start IV  ceftriaxone 1 gm q24h   · F/U ECHO to r/o vegetations    Medical Decision Making/Summary/Discussion:   ·   Infection Control Recommendations   · [place] Precautions  Antimicrobial Stewardship Recommendations     · Simplification of therapy  · Targeted therapy    Coordination of Outpatient Care:   · Estimated Length of IV antimicrobials: TBD  · Patient will need Midline Catheter Insertion: No  · Patient will need PICC line Insertion:No  · Patient will need: Home IV , [place],  SNF,  LTAC: TBD  · Patient will need outpatient wound care:No    Chief complaint/reason for consultation:   · Chest pain, SOB  · 1/2 blood cultures grew viridians streptococci    History of Present Illness:   Patience Saenz is a 21y.o.-year-old  male who was initially admitted on 3/23/2019. Patient seen at the request of . INITIAL HISTORY:    Pt with a past medical history of hemoglobin SC. He came to the [facility]. V's with complaints of chest pain, shortness of breath. He states that he is having severe chest pain from past 3 weeks. He describes the pain as shooting, 9 out of 10, worsens on deep inspiration. He describes the pain worsens on palpation. Also described shortness of breath because he is unable to take deep breaths. Denies any cough, fever, sore throat. States his chest pain didn't resolve since the admission. States he is having frequent chills not associated with shakes 4-5 times in a day from past 3-4 days.     He states that he was recently hospitalized in Nov education: Not on file    Highest education level: Not on file   Occupational History    Not on file   Social Needs    Financial resource strain: Not on file    Food insecurity:     Worry: Not on file     Inability: Not on file    Transportation needs:     Medical: Not on file     Non-medical: Not on file   Tobacco Use    Smoking status: Current Every Day Smoker     Packs/day: 1.00     Years: 2.00     Pack years: 2.00     Types: Cigarettes    Tobacco comment: marajuana q week   Substance and Sexual Activity    Alcohol use: No    Drug use: Yes     Frequency: 1.0 times per week     Comment: Ami Josue, 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    Sexual activity: Yes     Partners: Female     Birth control/protection: Condom   Lifestyle    Physical activity:     Days per week: Not on file     Minutes per session: Not on file    Stress: Not on file   Relationships    Social connections:     Talks on phone: Not on file     Gets together: Not on file     Attends Synagogue service: Not on file     Active member of club or organization: Not on file     Attends meetings of clubs or organizations: Not on file     Relationship status: Not on file    Intimate partner violence:     Fear of current or ex partner: Not on file     Emotionally abused: Not on file     Physically abused: Not on file     Forced sexual activity: Not on file   Other Topics Concern    Not on file   Social History Narrative    Not on file       Family History:     Family History   Problem Relation Age of Onset    Sickle Cell Anemia Brother         Allergies:   Patient has no known allergies. Review of Systems:   Constitutional: No fevers or chills. No systemic complaints  Head: No headaches  Eyes: No double vision or blurry vision. No conjunctival inflammation. ENT: No sore throat or runny nose. . No hearing loss, tinnitus or vertigo. Cardiovascular complaining of chest pain   Abdomen: No nausea, vomiting, diarrhea, or abdominal pain. [place]  No cramps. Genitourinary: No increased urinary frequency, or dysuria. No hematuria. No suprapubic or CVA pain  Musculoskeletal :  complaining of CHEST  pain  Hematologic: No bleeding or bruising. Neurologic: No headache, weakness, numbness, or tingling. Integument: No rash, no ulcers. Psychiatric: No depression. Endocrine: No polyuria, no polydipsia, no polyphagia. Physical Examination :     Patient Vitals for the past 8 hrs:   BP Temp Temp src Pulse Resp SpO2 Weight   03/27/19 0829 131/66 98.6 °F (37 °C) Oral 76 17 91 % --   03/27/19 0600 -- -- -- -- -- -- 194 lb 3 oz (88.1 kg)     General Appearance: Awake, alert, and in no apparent distress  Head:  Normocephalic, no trauma  Eyes:anicteric; conjunctivae pink. No embolic phenomena. ENT: Oropharynx clear, without erythema, exudate, or thrush. No tenderness of sinuses. Mouth/throat: good oral dayo, caries of left upper, lower, right upper, lower premolar possible caries. Neck:Supple, without lymphadenopathy. Thyroid normal, No bruits. Pulmonary/Chest:mild basal crackles in the right lower lobe  Cardiovascular tenderness in the right lower part of the chest to palpation   Abdomen: Soft, non tender. Bowel sounds normal. No organomegaly  All four Extremities: No cyanosis, clubbing, edema, or effusions. Neurologic: No gross sensory or motor deficits. Skin: Warm and dry with good turgor. No signs of peripheral arterial or venous insufficiency. No ulcerations. No open wounds. Medical Decision Making -Laboratory:   I have independently reviewed/ordered the following labs:    CBC with Differential:   Recent Labs     03/25/19  0642   WBC 11.6*   HGB 10.8*   HCT 29.5*        BMP:   Recent Labs     03/25/19  0642      K 3.4*      CO2 23   BUN 3*   CREATININE 0.60*   MG 1.9     Hepatic Function Panel:   Recent Labs     03/26/19  0614 03/27/19  0549   BILITOT 3.84* 3.34*     No results for input(s): RPR in the last 72 hours.   No results for input(s): HIV in the last 72 hours. No results for input(s): BC in the last 72 hours. Lab Results   Component Value Date    MUCUS NOT REPORTED 03/28/2014    RBC 3.43 03/25/2019    RBC 4.09 10/17/2011    TRICHOMONAS NOT REPORTED 03/28/2014    WBC 11.6 03/25/2019    YEAST NOT REPORTED 03/28/2014    TURBIDITY CLEAR 05/12/2015     Lab Results   Component Value Date    CREATININE 0.60 03/25/2019    GLUCOSE 95 03/25/2019       Medical Decision Making-Imaging:     CT CHEST PULMONARY EMBOLISM W CONTRAST  3-24-19        Impression   No evidence of an acute pulmonary embolus.       Mild patchy middle lobe and bilateral lower lobe airspace disease.  This may   represent pneumonia and/or atelectasis.  There is trace right pleural fluid.               Medical Decision Making-Other: Thank you for allowing us to participate in the care of this patient. Please call with questions. Lizbeth Mckinnon MD     ATTESTATION:    I have discussed the case, including pertinent history and exam findings with the residents. I have seen and examined the patient and the key elements of the encounter have been performed by me. I have reviewed the laboratory data, other diagnostic studies and discussed them with the residents. I have updated the medical record where necessary. I agree with the assessment, plan and orders as documented by the resident.     Jose De Jesus Mccain MD.    Pager: [phone] - Office: [phone]

## 2019-03-27 NOTE — PROGRESS NOTES
[facility] 19    Progress Note    3/27/2019    8:12 AM    Name:   Jhoan Lobo  MRN:     [id number]     Acct:      [de-identified]   Room:   [address] [place] [address] Day:  3  Admit Date:  3/23/2019 11:56 PM    PCP:   No primary care provider on file. Code Status:  Full Code    Subjective:     C/C:   Chief Complaint   Patient presents with    Chest Pain     Pt has had symptoms since he was admited to hospital 3/2019    Arm Pain    Shortness of Breath     Interval History Status: not changed. No acute issues overnight  Cultures 1 of 2 positive for strep viridans  No complaints per patient  Still c/o chest pain       Brief History:     The patient is a 21 y.o.  AA male who presents with Chest Pain (Pt has had symptoms since he was admited to hospital 3/2019); Arm Pain; and Shortness of Breath   and he is admitted to the hospital for the management of  Cp and shoulder pain. He had the shoulder pain 1st and was hospitalized for sickle crisis earlier this month. Few days after discharge he developed constant pleuritic cp. Took motrin without relief and ran out of percocet from last admission. Denies f/c/s/s/cough. Review of Systems:     Constitutional:  negative for chills, fevers, sweats  Respiratory:  negative for cough, dyspnea on exertion, hemoptysis, shortness of breath, wheezing  Cardiovascular:  Positive for chest pain  Gastrointestinal:  negative for abdominal pain, constipation, diarrhea, nausea, vomiting  Neurological:  negative for dizziness, headache    Medications:      Allergies:  No Known Allergies    Current Meds:   Scheduled Meds:    influenza virus vaccine  0.5 mL Intramuscular Once    HYDROmorphone  0.5 mg Intravenous Once    sodium chloride flush  10 mL Intravenous 2 times per day    enoxaparin  40 mg Subcutaneous Daily    aspirin  324 mg Oral Daily     Continuous Infusions:    sodium chloride 125 mL/hr at 03/27/19 0132 PRN Meds: sodium chloride flush, magnesium hydroxide, ondansetron, nicotine, acetaminophen, HYDROmorphone, oxyCODONE-acetaminophen, oxyCODONE-acetaminophen    Data:     Past Medical History:   has a past medical history of Chest pain, History of blood transfusion, Sickle cell anemia ([facility] Sickle cell disease, type SC ([facility] [address] and Unspecified diseases of blood and blood-forming organs. Social History:   reports that he has been smoking cigarettes. He has a 2.00 pack-year smoking history. He does not have any smokeless tobacco history on file. He reports that he has current or past drug history. Frequency: 1.00 time per week. He reports that he does not drink alcohol. Family History:   Family History   Problem Relation Age of Onset    Sickle Cell Anemia Brother        Vitals:  BP (!) 144/82   Pulse 93   Temp 98.7 °F (37.1 °C) (Oral)   Resp 18   Ht 6' 2\" (1.88 m)   Wt 194 lb 3 oz (88.1 kg)   SpO2 96%   BMI 24.93 kg/m²   No data recorded. No results for input(s): POCGLU in the last 72 hours. I/O (24Hr):     Intake/Output Summary (Last 24 hours) at 3/27/2019 0812  Last data filed at 3/27/2019 3382  Gross per 24 hour   Intake 2220 ml   Output --   Net 2220 ml       Labs:    Hematology:  Recent Labs     03/25/19  0642   WBC 11.6*   RBC 3.43*   HGB 10.8*   HCT 29.5*   MCV 86.0   MCH 31.5   MCHC 36.6*   RDW 14.9*      MPV 12.6     Chemistry:  Recent Labs     03/25/19  0642      K 3.4*      CO2 23   GLUCOSE 95   BUN 3*   CREATININE 0.60*   MG 1.9   ANIONGAP 12   LABGLOM >60   GFRAA >60   CALCIUM 8.0*     Recent Labs     03/25/19  0642 03/26/19  0614 03/27/19  0549   * 299* 330*   BILITOT  --  3.84* 3.34*         Lab Results   Component Value Date/Time    SPECIAL 10ML R AC 03/24/2019 10:35 AM    SPECIAL 6ML R FA 03/24/2019 10:35 AM     Lab Results   Component Value Date/Time    CULTURE (A) 03/24/2019 10:35 AM     POSITIVE Blood Culture  Results called to and read back by:   HA MARI ON 3/26/19 AT 0246      CULTURE  03/24/2019 10:35 AM     DIRECT GRAM STAIN FROM BOTTLE:  GRAM POSITIVE COCCI IN CLUSTERS      CULTURE VIRIDANS STREPTOCOCCUS GROUP (A) 03/24/2019 10:35 AM    CULTURE NO GROWTH 2 DAYS 03/24/2019 10:35 AM       No results found for: POCPH, PHART, PH, POCPCO2, GMU6SOK, PCO2, POCPO2, PO2ART, PO2, POCHCO3, EMC4KMA, HCO3, NBEA, PBEA, BEART, BE, THGBART, THB, RLF8CFN, [id number], V2UKECOA, O2SAT, FIO2      Physical Examination:        General appearance:  alert, cooperative and no distress  Mental Status:  oriented to person, place and time and normal affect  Lungs:  clear to auscultation bilaterally, normal effort  Heart:  regular rate and rhythm  Abdomen:  soft, nontender, nondistended, normal bowel sounds  Extremities:  no edema, redness, tenderness in the calves  Skin:  no gross lesions, rashes, induration    Assessment:        Primary Problem  Sickle cell crisis [facility])    Active Hospital Problems    Diagnosis Date Noted    [facility] [D57.00] 03/27/2019    Viridans streptococci infection [A49.1]     Septicemia ([facility] [address] [facility] [A41.9]     Chest pain, pleuritic [R07.81]     Acute chest syndrome ([facility] [address] [facility] [D57.01] 03/24/2019    Tobacco abuse [Z72.0] 10/24/2018    Sickle cell anemia ([facility] [address] [D57.1]        Plan:        - Hematology following - low suspicion for ACS  - Continue IVF  - Pain control  - Monitor reticulocyte counts and LDH  - Follow up blood cultures - 1 of 2 positive - strep viridans  - ID consult  - Repeat blood cultures       Mattie Avila MD  3/27/2019  8:12 AM

## 2019-03-27 NOTE — PROGRESS NOTES
Today's Date: 3/27/2019  Patient Name: Saw Gaxiola  Date of admission: 3/23/2019 11:56 PM  Patient's age: 21 y.o., 1995  Admission Dx: Chest pain [R07.9]  Acute chest syndrome due to sickle cell crisis [facility]) [D57.01]    Reason for Consult: management recommendations  Requesting Physician: Jolynn Landon DO    CHIEF COMPLAINT:  Chest pain, arm pain and shortness of breath. History Obtained From:  patient, electronic medical record    Interval history:    Continues to complain of severe back pain subjectively. No fever. No tachycardia or tachypnea. Patient not hypoxic. Patient continues to smoke. Hemolytic indicis stable. HISTORY OF PRESENT ILLNESS:      The patient is a 21 y.o.  male who is admitted to the hospital for chief complaints of chest pain arm pain and shortness of breath. Patient was earlier discharged from the hospital with acute pain crisis. Patient states that his pain never completely went away even when he was sent home and while he was at home it was sent. CT chest shows bilateral lower lobe infiltrates concerning for atelectasis versus acute chest syndrome. Patient denies any fever. Patient is not tachycardic or tachypneic. Patient is not hypoxic. Patient's hemolytic indicis including LDH and bilirubin and the hemoglobin itself are at baseline. Patient has Hb SC disease. Past Medical History:   has a past medical history of Chest pain, History of blood transfusion, Sickle cell anemia ([address] Sickle cell disease, type SC ([facility] [address] and Unspecified diseases of blood and blood-forming organs. 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      Medications:    Prior to Admission medications    Not on File     Current Facility-Administered Medications   Medication Dose Route Frequency Provider Last Rate Last Dose    influenza quadrivalent split vaccine (FLUZONE;FLUARIX;FLULAVAL;AFLURIA) injection 0.5 mL  0.5 mL Intramuscular Once Srinivasa Catherine MD        HYDROmorphone (DILAUDID) injection 0.5 mg  0.5 mg Intravenous Once Adarsh Vieira MD   Stopped at 03/24/19 0130    0.9 % sodium chloride infusion   Intravenous Continuous Cristóbal P Blood,  mL/hr at 03/27/19 0132      sodium chloride flush 0.9 % injection 10 mL  10 mL Intravenous 2 times per day Myriam MARILYNN Cruz, APRN - CNP        sodium chloride flush 0.9 % injection 10 mL  10 mL Intravenous PRN Myriam MARILYNN Goncalvesosso, APRN - CNP        magnesium hydroxide (MILK OF MAGNESIA) 400 MG/5ML suspension 30 mL  30 mL Oral Daily PRN Myriam Cruz, APRN - CNP        ondansetron (ZOFRAN) injection 4 mg  4 mg Intravenous Q6H PRN Myriam MARILYNN Cruz APRN - CNP        nicotine (NICODERM CQ) 21 MG/24HR 1 patch  1 patch Transdermal Daily PRN Myriam MARILYNN Cruz, APRN - CNP        enoxaparin (LOVENOX) injection 40 mg  40 mg Subcutaneous Daily [facility] MARILYNN Cruz APRN - CNP   40 mg at 03/26/19 0836    acetaminophen (TYLENOL) tablet 650 mg  650 mg Oral Q4H PRN Myriam MARILYNN Cruz, APRN - CNP        aspirin chewable tablet 324 mg  324 mg Oral Daily [facility] RODDY Gutierres - CNP   324 mg at 03/26/19 0835    HYDROmorphone (DILAUDID) injection 1 mg  1 mg Intravenous Q3H PRN Cristóbal P Blood, DO   1 mg at 03/27/19 0243    oxyCODONE-acetaminophen (PERCOCET) 5-325 MG per tablet 1 tablet  1 tablet Oral Q4H PRN Cristóbal P Blood, DO        oxyCODONE-acetaminophen (PERCOCET) 5-325 MG per tablet 2 tablet  2 tablet Oral Q4H PRN Cristóbal P Blood, DO   2 tablet at 03/27/19 0535       Allergies:  Patient has no known allergies. Social History:   reports that he has been smoking cigarettes. He has a 2.00 pack-year smoking history. He does not have any smokeless tobacco history on file. He reports that he has current or past drug history. Frequency: 1.00 time per week.  He reports that he does not drink alcohol. Family History: family history includes Sickle Cell Anemia in his brother. REVIEW OF SYSTEMS:      Constitutional: No fever or chills. No night sweats, no weight loss   Eyes: No eye discharge, double vision, or eye pain   HEENT: negative for sore mouth, sore throat, hoarseness and voice change   Respiratory: negative for cough , sputum, dyspnea, wheezing, hemoptysis, chest pain   Cardiovascular: negative for chest pain, dyspnea, palpitations, orthopnea, PND   Gastrointestinal: negative for nausea, vomiting, diarrhea, constipation, abdominal pain, Dysphagia, hematemesis and hematochezia   Genitourinary: negative for frequency, dysuria, nocturia, urinary incontinence, and hematuria   Integument: negative for rash, skin lesions, bruises. Hematologic/Lymphatic: negative for easy bruising, bleeding, lymphadenopathy, or petechiae   Endocrine: negative for heat or cold intolerance,weight changes, change in bowel habits and hair loss   Musculoskeletal:  Back pain and arm pain.   Neurological: negative for headaches, dizziness, seizures, weakness, numbness    PHYSICAL EXAM:        BP (!) 144/82   Pulse 93   Temp 98.7 °F (37.1 °C) (Oral)   Resp 18   Ht 6' 2\" (1.88 m)   Wt 194 lb 3 oz (88.1 kg)   SpO2 96%   BMI 24.93 kg/m²    Temp (24hrs), Av.7 °F (37.1 °C), Min:98.7 °F (37.1 °C), Max:98.7 °F (37.1 °C)      General appearance - well appearing, no in pain or distress   Mental status - alert and cooperative   Eyes - pupils equal and reactive, extraocular eye movements intact   Ears - bilateral TM's and external ear canals normal   Mouth - mucous membranes moist, pharynx normal without lesions   Neck - supple, no significant adenopathy   Lymphatics - no palpable lymphadenopathy, no hepatosplenomegaly   Chest -  decreased breathing sounds  Heart - normal rate, regular rhythm, normal S1, S2, no murmurs  Abdomen - soft, nontender, nondistended, no masses or organomegaly Neurological - alert, oriented, normal speech, no focal findings or movement disorder noted   Musculoskeletal - no joint tenderness, deformity or swelling   Extremities - peripheral pulses normal, no pedal edema, no clubbing or cyanosis   Skin - normal coloration and turgor, no rashes, no suspicious skin lesions noted ,      DATA:      Labs:     CBC:   Recent Labs     03/25/19  0642   WBC 11.6*   HGB 10.8*   HCT 29.5*        BMP:   Recent Labs     03/25/19  0642      K 3.4*   CO2 23   BUN 3*   CREATININE 0.60*   LABGLOM >60   GLUCOSE 95     Results for orders placed or performed during the hospital encounter of 03/23/19   CULTURE BLOOD #1   Result Value Ref Range    Specimen Description . BLOOD     Special Requests 10ML R AC     Culture (A)      POSITIVE Blood Culture  Results called to and read back by:  Karrie Su. ON 3/26/19 AT 0246      Culture       DIRECT GRAM STAIN FROM BOTTLE:  GRAM POSITIVE COCCI IN CLUSTERS     CULTURE BLOOD #2   Result Value Ref Range    Specimen Description . BLOOD     Special Requests 6ML R FA     Culture NO GROWTH 2 DAYS    Respiratory Virus PCR Panel   Result Value Ref Range    Specimen Description . NASOPHARYNGEAL SWAB     Adenovirus PCR Not Detected Not Detected    Coronavirus 229E PCR Not Detected Not Detected    Coronavirus HKU1 PCR Not Detected Not Detected    Coronavirus NL63 PCR Not Detected Not Detected    Coronavirus OC43 PCR Not Detected Not Detected    Human Metapneumovirus PCR Not Detected Not Detected    Rhino/Enterovirus PCR Not Detected Not Detected    Influenza A by PCR Not Detected Not Detected    Influenza A H1 PCR NOT REPORTED Not Detected    Influenza A H1 (2009) PCR NOT REPORTED Not Detected    Influenza A H3 PCR NOT REPORTED Not Detected    Influenza B by PCR Not Detected Not Detected    Parainfluenza 1 PCR Not Detected Not Detected    Parainfluenza 2 PCR Not Detected Not Detected    Parainfluenza 3 PCR Not Detected Not Detected    Parainfluenza 4 PCR Not Detected Not Detected    Resp Syncytial Virus PCR Not Detected Not Detected    B Pertussis by PCR Not Detected Not Detected    Chlamydia pneumoniae By PCR Not Detected Not Detected    Mycoplasma pneumo by PCR Not Detected Not Detected   CBC WITH AUTO DIFFERENTIAL   Result Value Ref Range    WBC 14.5 (H) 3.5 - 11.3 k/uL    RBC 3.91 (L) 4.21 - 5.77 m/uL    Hemoglobin 12.4 (L) 13.0 - 17.0 g/dL    Hematocrit 33.2 (L) 40.7 - 50.3 %    MCV 84.9 82.6 - 102.9 fL    MCH 31.7 25.2 - 33.5 pg    MCHC 37.3 (H) 28.4 - 34.8 g/dL    RDW 14.6 (H) 11.8 - 14.4 %    Platelets 156 344 - 201 k/uL    MPV 12.2 8.1 - 13.5 fL    NRBC Automated 0.9 (H) 0.0 per 100 WBC    Differential Type NOT REPORTED     WBC Morphology NOT REPORTED     RBC Morphology NOT REPORTED     Platelet Estimate NOT REPORTED     Immature Granulocytes 0 0 %    Seg Neutrophils 68 (H) 36 - 66 %    Lymphocytes 23 (L) 24 - 44 %    Monocytes 8 (H) 1 - 7 %    Eosinophils % 0 (L) 1 - 4 %    Basophils 1 0 - 2 %    nRBC 1 (H) 0 per 100 WBC    Absolute Immature Granulocyte 0.00 0.00 - 0.30 k/uL    Segs Absolute 9.85 (H) 1.8 - 7.7 k/uL    Absolute Lymph # 3.34 1.0 - 4.8 k/uL    Absolute Mono # 1.16 (H) 0.1 - 0.8 k/uL    Absolute Eos # 0.00 0.0 - 0.4 k/uL    Basophils # 0.15 0.0 - 0.2 k/uL    Morphology ANISOCYTOSIS PRESENT     Morphology 2+ TARGET CELLS     Morphology 1+ POLYCHROMASIA     Morphology SICKLE CELLS PRESENT    LACTATE DEHYDROGENASE   Result Value Ref Range     (H) 135 - 225 U/L   Reticulocytes   Result Value Ref Range    Retic % 5.7 (H) 0.5 - 1.9 %    Absolute Retic # 0.220 (H) 0.030 - 0.080 M/uL    Immature Retic Fract 29.400 (H) 2.7 - 18.3 %    Retic Hemoglobin 33.4 28.2 - 35.7 pg   Basic Metabolic Panel   Result Value Ref Range    Glucose 86 70 - 99 mg/dL    BUN 4 (L) 6 - 20 mg/dL    CREATININE 0.63 (L) 0.70 - 1.20 mg/dL    Bun/Cre Ratio NOT REPORTED 9 - 20    Calcium 8.8 8.6 - 10.4 mg/dL    Sodium 135 135 - 144 mmol/L    Potassium 3.7 3.7 - 5.3 mmol/L Chloride 102 98 - 107 mmol/L    CO2 24 20 - 31 mmol/L    Anion Gap 9 9 - 17 mmol/L    GFR Non-African American >60 >60 mL/min    GFR African American >60 >60 mL/min    GFR Comment          GFR Staging NOT REPORTED    TROP/MYOGLOBIN   Result Value Ref Range    Troponin, High Sensitivity <6 0 - 22 ng/L    Troponin T NOT REPORTED <0.03 ng/mL    Troponin Interp NOT REPORTED     Myoglobin 94 (H) 28 - 72 ng/mL   Procalcitonin   Result Value Ref Range    Procalcitonin 0.08 <0.09 ng/mL   Hemoglobinopathy Eval (Electrophoresis)   Result Value Ref Range    Hgb Electrophoresis Interp       Hb ELECTROPHORESIS EXHIBITS HbS AND HbC. PREVIOUSLY CONFIRMED BY ACID GEL ELECTROPHORESIS. 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 Cristina Williamson M.D.     Basic Metabolic Panel w/ Reflex to MG   Result Value Ref Range    Glucose 95 70 - 99 mg/dL    BUN 3 (L) 6 - 20 mg/dL    CREATININE 0.60 (L) 0.70 - 1.20 mg/dL    Bun/Cre Ratio NOT REPORTED 9 - 20    Calcium 8.0 (L) 8.6 - 10.4 mg/dL    Sodium 139 135 - 144 mmol/L    Potassium 3.4 (L) 3.7 - 5.3 mmol/L    Chloride 104 98 - 107 mmol/L    CO2 23 20 - 31 mmol/L    Anion Gap 12 9 - 17 mmol/L    GFR Non-African American >60 >60 mL/min    GFR African American >60 >60 mL/min    GFR Comment          GFR Staging NOT REPORTED    CBC   Result Value Ref Range    WBC 11.6 (H) 3.5 - 11.3 k/uL    RBC 3.43 (L) 4.21 - 5.77 m/uL    Hemoglobin 10.8 (L) 13.0 - 17.0 g/dL    Hematocrit 29.5 (L) 40.7 - 50.3 %    MCV 86.0 82.6 - 102.9 fL    MCH 31.5 25.2 - 33.5 pg    MCHC 36.6 (H) 28.4 - 34.8 g/dL    RDW 14.9 (H) 11.8 - 14.4 %    Platelets 837 315 - 887 k/uL    MPV 12.6 8.1 - 13.5 fL    NRBC Automated 0.6 (H) 0.0 per 100 WBC   Magnesium   Result Value Ref Range    Magnesium 1.9 1.6 - 2.6 mg/dL   Lactate Dehydrogenase   Result Value Ref Range     (H) 135 - 225 U/L   Reticulocytes   Result Value Ref Range    Retic % 5.0 (H) 0.5 - 1.9 %    Absolute Retic # 0.170 (H) 0.030 - 0.080 M/uL    Immature Retic Fract 25.800 (H) 2.7 - 18.3 %    Retic Hemoglobin 31.2 28.2 - 35.7 pg   Lactate Dehydrogenase   Result Value Ref Range     (H) 135 - 225 U/L   BILIRUBIN, TOTAL   Result Value Ref Range    Total Bilirubin 3.84 (H) 0.3 - 1.2 mg/dL   RETICULOCYTES   Result Value Ref Range    Retic % 5.6 (H) 0.5 - 1.9 %    Absolute Retic # 0.190 (H) 0.030 - 0.080 M/uL    Immature Retic Fract 24.300 (H) 2.7 - 18.3 %    Retic Hemoglobin 31.9 28.2 - 35.7 pg   EKG 12 Lead   Result Value Ref Range    Ventricular Rate 72 BPM    Atrial Rate 72 BPM    P-R Interval 162 ms    QRS Duration 92 ms    Q-T Interval 372 ms    QTc Calculation (Bazett) 407 ms    P Axis 68 degrees    R Axis 34 degrees    T Axis 61 degrees         IMAGING DATA:    Xr Chest Standard (2 Vw)    Result Date: 3/24/2019  EXAMINATION: TWO VIEWS OF THE CHEST 3/24/2019 12:24 am COMPARISON: None. HISTORY: ORDERING SYSTEM PROVIDED HISTORY: rule out acute chest syndrome TECHNOLOGIST PROVIDED HISTORY: rule out acute chest syndrome Right-sided chest pain, chest tightness and difficulty breathing for the past 5 days. Prior studies including chest x-ray 10/31/2018 and CT chest 11/01/2018 FINDINGS: There is mild patchy airspace disease in the middle lobe and right lower lobe. There is adjacent pleural thickening and tenting of the right hemidiaphragm. Lungs are otherwise clear. Heart size and configuration are normal.     Right basilar pleural and parenchymal disease may be acute/recurrent. Residual scarring from the acute disease noted on 10/31/2018 is not excluded. Xr Shoulder Right (min 2 Views)    Result Date: 3/11/2019  EXAMINATION: 3 XRAY VIEWS OF THE RIGHT SHOULDER 3/11/2019 8:17 pm COMPARISON: None. HISTORY: ORDERING SYSTEM PROVIDED HISTORY: rt arm pain TECHNOLOGIST PROVIDED HISTORY: rt arm pain FINDINGS: 3 images of the right shoulder were provided.   Alignment is normal. Flattening of the superolateral aspect of the [address]  Vascular Upper Extremities Veins Procedure   Patient Name  YEIMI      Date of Study         03/12/2019                HEATHER SUBRAMANIAN   Date of Birth 1995  Gender                Male   Age           21 year(s)  Race                  Black   Room Number   2009   Corporate ID  [id number]  #   Patient Acct  [de-identified]  #   MR #          [id number]     Sonographer           Kristin Shoemaker   Accession #   [id number]   Interpreting          Shay Dailey                            Physician   Referring                 Referring Physician   Fracisco Vizcarra, *  Nurse  Practitioner  Procedure Type of Study:   Veins: Upper Extremities Veins, Venous Scan Upper Right. Indications for Study:Arm pain. Patient Status: In Patient. Conclusions   Summary   No evidence of superficial or deep venous thrombosis in the right upper  extremity. Signature   ----------------------------------------------------------------  Electronically signed by Shay Dailey(Interpreting physician)  on 03/12/2019 09:37 PM  ----------------------------------------------------------------  Findings:   Right Impression:  Right internal jugular, subclavian, axillary, brachial, ulnar, radial,  cephalic and basilic veins are compressible with normal doppler  responses. Risk Factors History +---------+----+-----------------------------------------------------------+ ! Diagnosis! Date! Comments                                                   ! +---------+----+-----------------------------------------------------------+ ! Other    ! ! Sickle Cell Anemia                                         ! +---------+----+-----------------------------------------------------------+ Velocities are measured in cm/s ; Diameters are measured in cm Right UE Vein Measurements 2D Measurements +------------------------------------+----------+---------------+----------+ ! Location                            ! Visualized! Compressibility! Thrombosis! +------------------------------------+----------+---------------+----------+ ! Prox IJV                            ! Yes       ! Yes            ! None      ! +------------------------------------+----------+---------------+----------+ ! Dist IJV                            ! Yes       ! Yes            ! None      ! +------------------------------------+----------+---------------+----------+ ! Prox SCV                            ! Yes       ! Yes            ! None      ! +------------------------------------+----------+---------------+----------+ ! Dist SCV                            ! Yes       ! Yes            ! None      ! +------------------------------------+----------+---------------+----------+ ! Prox Axillary                       ! Yes       ! Yes            ! None      ! +------------------------------------+----------+---------------+----------+ ! Dist Axillary                       ! Yes       ! Yes            ! None      ! +------------------------------------+----------+---------------+----------+ ! Prox Brachial                       !Yes       ! Yes            ! None      ! +------------------------------------+----------+---------------+----------+ ! Dist Brachial                       !Yes       ! Yes            ! None      ! +------------------------------------+----------+---------------+----------+ ! Prox Radial                         !Yes       ! Yes            ! None      ! +------------------------------------+----------+---------------+----------+ ! Dist Radial                         !Yes       ! Yes            ! None      ! +------------------------------------+----------+---------------+----------+ ! Prox Ulnar                          ! Yes       ! Yes            ! None      ! +------------------------------------+----------+---------------+----------+ ! Dist Ulnar                          ! Yes       ! Yes            ! None      ! +------------------------------------+----------+---------------+----------+ ! Alex at UA !Yes       !Yes            ! None      ! +------------------------------------+----------+---------------+----------+ ! Basilic at AF                       ! Yes       ! Yes            ! None      ! +------------------------------------+----------+---------------+----------+ ! Basilic at [address]                       ! Yes       ! Yes            ! None      ! +------------------------------------+----------+---------------+----------+ ! Cephalic at UA                      ! Yes       ! Yes            ! None      ! +------------------------------------+----------+---------------+----------+ ! Cephalic at AF                      ! Yes       ! Yes            ! None      ! +------------------------------------+----------+---------------+----------+ ! Cephalic at [address]                      ! Yes       ! Yes            ! None      ! +------------------------------------+----------+---------------+----------+ Doppler Measurements +-------------------------+-----------------------+------------------------+ ! Location                 ! Signal                 !Reflux                  ! +-------------------------+-----------------------+------------------------+ ! IJV                      ! Phasic                 !                        ! +-------------------------+-----------------------+------------------------+ ! SCV                      ! Phasic                 !                        ! +-------------------------+-----------------------+------------------------+ ! Axillary                 ! Phasic                 !                        ! +-------------------------+-----------------------+------------------------+      Primary Problem  Acute chest syndrome [facility])    Active Hospital Problems    Diagnosis Date Noted    Acute chest syndrome ([facility] [D57.01] 03/24/2019    Tobacco abuse [Z72.0] 10/24/2018    Sickle cell anemia ([facility] [D57.1]          RECOMMENDATIONS:  I had a detailed discussion with the patient and personally went over results of his lab workup imaging studies are the relevant clinical data. Continue to monitor hematocrit versus  Do not suspect acute chest syndrome  Continue supportive care  Antibiotics for bacteremia  Pain management IV fluid  Patient was also advised to use incentive spirometer    Discussed with patient and Nurse. Thank you for asking us to see this patient. Rosa Dockery MD        This note is created with the assistance of a speech recognition program.  While intending to generate a document that actually reflects the content of the visit, the document can still have some errors including those of syntax and sound a like substitutions which may escape proof reading. It such instances, actual meaning can be extrapolated by contextual diversion.

## 2019-03-28 VITALS
RESPIRATION RATE: 17 BRPM | OXYGEN SATURATION: 97 % | BODY MASS INDEX: 24.92 KG/M2 | WEIGHT: 194.19 LBS | DIASTOLIC BLOOD PRESSURE: 89 MMHG | HEIGHT: 74 IN | SYSTOLIC BLOOD PRESSURE: 154 MMHG | HEART RATE: 81 BPM | TEMPERATURE: 98.6 F

## 2019-03-28 LAB
ABSOLUTE RETIC #: 0.16 M/UL (ref 0.03–0.08)
BILIRUB SERPL-MCNC: 2.59 MG/DL (ref 0.3–1.2)
CULTURE: ABNORMAL
HEMOGLOBIN: 10.6 G/DL (ref 13–17)
IMMATURE RETIC FRACT: 26.7 % (ref 2.7–18.3)
LACTATE DEHYDROGENASE: 294 U/L (ref 135–225)
Lab: ABNORMAL
RETIC %: 4.7 % (ref 0.5–1.9)
RETIC HEMOGLOBIN: 31.3 PG (ref 28.2–35.7)
SPECIMEN DESCRIPTION: ABNORMAL

## 2019-03-28 PROCEDURE — 99232 SBSQ HOSP IP/OBS MODERATE 35: CPT | Performed by: INTERNAL MEDICINE

## 2019-03-28 PROCEDURE — 82247 BILIRUBIN TOTAL: CPT

## 2019-03-28 PROCEDURE — 85018 HEMOGLOBIN: CPT

## 2019-03-28 PROCEDURE — 99239 HOSP IP/OBS DSCHRG MGMT >30: CPT | Performed by: INTERNAL MEDICINE

## 2019-03-28 PROCEDURE — 6360000002 HC RX W HCPCS: Performed by: INTERNAL MEDICINE

## 2019-03-28 PROCEDURE — 2580000003 HC RX 258: Performed by: INTERNAL MEDICINE

## 2019-03-28 PROCEDURE — 90686 IIV4 VACC NO PRSV 0.5 ML IM: CPT | Performed by: INTERNAL MEDICINE

## 2019-03-28 PROCEDURE — 6370000000 HC RX 637 (ALT 250 FOR IP): Performed by: INTERNAL MEDICINE

## 2019-03-28 PROCEDURE — 36415 COLL VENOUS BLD VENIPUNCTURE: CPT

## 2019-03-28 PROCEDURE — 85045 AUTOMATED RETICULOCYTE COUNT: CPT

## 2019-03-28 PROCEDURE — G0008 ADMIN INFLUENZA VIRUS VAC: HCPCS | Performed by: INTERNAL MEDICINE

## 2019-03-28 PROCEDURE — 6360000002 HC RX W HCPCS: Performed by: NURSE PRACTITIONER

## 2019-03-28 PROCEDURE — 6370000000 HC RX 637 (ALT 250 FOR IP): Performed by: NURSE PRACTITIONER

## 2019-03-28 PROCEDURE — 83615 LACTATE (LD) (LDH) ENZYME: CPT

## 2019-03-28 RX ORDER — AMOXICILLIN AND CLAVULANATE POTASSIUM 875; 125 MG/1; MG/1
1 TABLET, FILM COATED ORAL EVERY 12 HOURS SCHEDULED
Status: DISCONTINUED | OUTPATIENT
Start: 2019-03-28 | End: 2019-03-28 | Stop reason: HOSPADM

## 2019-03-28 RX ORDER — AMOXICILLIN AND CLAVULANATE POTASSIUM 875; 125 MG/1; MG/1
1 TABLET, FILM COATED ORAL EVERY 12 HOURS SCHEDULED
Status: DISCONTINUED | OUTPATIENT
Start: 2019-03-28 | End: 2019-03-28

## 2019-03-28 RX ORDER — AMOXICILLIN AND CLAVULANATE POTASSIUM 875; 125 MG/1; MG/1
1 TABLET, FILM COATED ORAL EVERY 12 HOURS SCHEDULED
Qty: 20 TABLET | Refills: 0 | Status: SHIPPED | OUTPATIENT
Start: 2019-03-28 | End: 2019-04-07

## 2019-03-28 RX ORDER — OXYCODONE HYDROCHLORIDE AND ACETAMINOPHEN 5; 325 MG/1; MG/1
2 TABLET ORAL EVERY 4 HOURS PRN
Qty: 30 TABLET | Refills: 0 | Status: SHIPPED | OUTPATIENT
Start: 2019-03-28 | End: 2019-04-04

## 2019-03-28 RX ADMIN — ASPIRIN 81 MG 324 MG: 81 TABLET ORAL at 08:36

## 2019-03-28 RX ADMIN — ENOXAPARIN SODIUM 40 MG: 40 INJECTION SUBCUTANEOUS at 08:36

## 2019-03-28 RX ADMIN — SODIUM CHLORIDE: 9 INJECTION, SOLUTION INTRAVENOUS at 02:30

## 2019-03-28 RX ADMIN — INFLUENZA A VIRUS A/MICHIGAN/45/2015 X-275 (H1N1) ANTIGEN (FORMALDEHYDE INACTIVATED), INFLUENZA A VIRUS A/SINGAPORE/INFIMH-16-0019/2016 IVR-186 (H3N2) ANTIGEN (FORMALDEHYDE INACTIVATED), INFLUENZA B VIRUS B/PHUKET/3073/2013 ANTIGEN (FORMALDEHYDE INACTIVATED), AND INFLUENZA B VIRUS B/MARYLAND/15/2016 BX-69A ANTIGEN (FORMALDEHYDE INACTIVATED) 0.5 ML: 15; 15; 15; 15 INJECTION, SUSPENSION INTRAMUSCULAR at 08:36

## 2019-03-28 RX ADMIN — HYDROMORPHONE HYDROCHLORIDE 1 MG: 1 INJECTION, SOLUTION INTRAMUSCULAR; INTRAVENOUS; SUBCUTANEOUS at 01:55

## 2019-03-28 RX ADMIN — SODIUM CHLORIDE: 9 INJECTION, SOLUTION INTRAVENOUS at 10:45

## 2019-03-28 RX ADMIN — OXYCODONE HYDROCHLORIDE AND ACETAMINOPHEN 2 TABLET: 5; 325 TABLET ORAL at 12:22

## 2019-03-28 RX ADMIN — HYDROMORPHONE HYDROCHLORIDE 1 MG: 1 INJECTION, SOLUTION INTRAMUSCULAR; INTRAVENOUS; SUBCUTANEOUS at 05:51

## 2019-03-28 RX ADMIN — OXYCODONE HYDROCHLORIDE AND ACETAMINOPHEN 2 TABLET: 5; 325 TABLET ORAL at 03:08

## 2019-03-28 RX ADMIN — HYDROMORPHONE HYDROCHLORIDE 1 MG: 1 INJECTION, SOLUTION INTRAMUSCULAR; INTRAVENOUS; SUBCUTANEOUS at 10:45

## 2019-03-28 RX ADMIN — OXYCODONE HYDROCHLORIDE AND ACETAMINOPHEN 2 TABLET: 5; 325 TABLET ORAL at 08:33

## 2019-03-28 ASSESSMENT — PAIN SCALES - GENERAL
PAINLEVEL_OUTOF10: 7
PAINLEVEL_OUTOF10: 9
PAINLEVEL_OUTOF10: 7

## 2019-03-28 NOTE — PROGRESS NOTES
Jose Juan Weaver 19    Progress Note    3/28/2019    9:34 AM    Name:   Freedom Pendleton  MRN:     [id number]     Acct:      [de-identified]   Room:   [address] [place] [address]  IP Day:  4  Admit Date:  3/23/2019 11:56 PM    PCP:   No primary care provider on file. Code Status:  Full Code    Subjective:     C/C:   Chief Complaint   Patient presents with    Chest Pain     Pt has had symptoms since he was admited to hospital 3/2019    Arm Pain    Shortness of Breath     Interval History Status: not changed. No acute issues overnight  ECHO negative  Culture likely contaminant   DC today       Brief History:     The patient is a 21 y.o.  AA male who presents with Chest Pain (Pt has had symptoms since he was admited to hospital 3/2019); Arm Pain; and Shortness of Breath   and he is admitted to the hospital for the management of  Cp and shoulder pain. He had the shoulder pain 1st and was hospitalized for sickle crisis earlier this month. Few days after discharge he developed constant pleuritic cp. Took motrin without relief and ran out of percocet from last admission. Denies f/c/s/s/cough. Review of Systems:     Constitutional:  negative for chills, fevers, sweats  Respiratory:  negative for cough, dyspnea on exertion, hemoptysis, shortness of breath, wheezing  Cardiovascular:  Positive for chest pain  Gastrointestinal:  negative for abdominal pain, constipation, diarrhea, nausea, vomiting  Neurological:  negative for dizziness, headache    Medications:      Allergies:  No Known Allergies    Current Meds:   Scheduled Meds:    cefTRIAXone (ROCEPHIN) IV  1 g Intravenous Q24H    HYDROmorphone  0.5 mg Intravenous Once    sodium chloride flush  10 mL Intravenous 2 times per day    enoxaparin  40 mg Subcutaneous Daily    aspirin  324 mg Oral Daily     Continuous Infusions:    sodium chloride 125 mL/hr at 03/28/19 0230     PRN Meds: sodium chloride flush, magnesium hydroxide, ondansetron, nicotine, acetaminophen, HYDROmorphone, oxyCODONE-acetaminophen, oxyCODONE-acetaminophen    Data:     Past Medical History:   has a past medical history of Chest pain, History of blood transfusion, Sickle cell anemia ([facility] Sickle cell disease, type SC ([facility] and Unspecified diseases of blood and blood-forming organs. Social History:   reports that he has been smoking cigarettes. He has a 2.00 pack-year smoking history. He does not have any smokeless tobacco history on file. He reports that he has current or past drug history. Frequency: 1.00 time per week. He reports that he does not drink alcohol. Family History:   Family History   Problem Relation Age of Onset    Sickle Cell Anemia Brother        Vitals:  BP (!) 154/89   Pulse 81   Temp 98.6 °F (37 °C) (Oral)   Resp 17   Ht 6' 2\" (1.88 m)   Wt 194 lb 3 oz (88.1 kg)   SpO2 97%   BMI 24.93 kg/m²   Temp (24hrs), Av.2 °F (36.8 °C), Min:97.7 °F (36.5 °C), Max:98.6 °F (37 °C)    No results for input(s): POCGLU in the last 72 hours. I/O (24Hr): Intake/Output Summary (Last 24 hours) at 3/28/2019 0934  Last data filed at 3/28/2019 0553  Gross per 24 hour   Intake 1400 ml   Output --   Net 1400 ml       Labs:    Hematology:  Recent Labs     19  0522   HGB 10.6*     Chemistry:  No results for input(s): NA, K, CL, CO2, GLUCOSE, BUN, CREATININE, MG, ANIONGAP, LABGLOM, GFRAA, CALCIUM, CAION, PHOS, PSA, PROBNP, TROPHS, CKTOTAL, CKMB, CKMBINDEX, MYOGLOBIN, DIGOXIN, LACTACIDWB in the last 72 hours.   Recent Labs     19  0614 19  0549 19  0522   * 330* 294*   BILITOT 3.84* 3.34* 2.59*         Lab Results   Component Value Date/Time    SPECIAL lft ac 7ml 2019 01:56 PM     Lab Results   Component Value Date/Time    CULTURE NO GROWTH 1 HOUR 2019 01:56 PM       No results found for: POCPH, PHART, PH, POCPCO2, MMJ6KBE, PCO2, POCPO2, PO2ART, PO2, POCHCO3, JJI3EQJ, HCO3, NBEA, PBEA, BEART, BE, THGBART, THB, SOL4MTP, WLGE9FHE, X2TZEADR, O2SAT, FIO2      Physical Examination:        General appearance:  alert, cooperative and no distress  Mental Status:  oriented to person, place and time and normal affect  Lungs:  clear to auscultation bilaterally, normal effort  Heart:  regular rate and rhythm  Abdomen:  soft, nontender, nondistended, normal bowel sounds  Extremities:  no edema, redness, tenderness in the calves  Skin:  no gross lesions, rashes, induration    Assessment:        Primary Problem  Sickle cell crisis [facility])    Active Hospital Problems    Diagnosis Date Noted    Sickle cell crisis ([facility] [D57.00] 03/27/2019    Viridans streptococci infection [A49.1]     Septicemia ([facility] [A41.9]     Chest pain, pleuritic [R07.81]     Acute chest syndrome ([facility] [D57.01] 03/24/2019    Tobacco abuse [Z72.0] 10/24/2018    Sickle cell anemia ([facility] [D57.1]        Plan:        - Hematology following - low suspicion for ACS  - Continue IVF  - Pain control  - Monitor reticulocyte counts and LDH  - Follow up blood cultures - 1 of 2 positive - strep viridans  - ID consulted - likely contaminant  - Repeat blood culture  - DC planning        Emily Frank MD  3/28/2019  9:34 AM

## 2019-03-28 NOTE — CONSULTS
[facility] - Initial Consult Note  Today's Date and Time: 3/28/2019, 8:56 AM    Impression :   1. Viridians streptococci bacteremia (1/2) on 3-24-19  2. Dental caries  3. Hb Sc disease  4. Acute chest syndrome (vaso occlusive pain crisis)  5. CT chest 3-24-19 with residual changes from prior RLL pneumonia 11-1-18    Recommendations:   · Possible contamination  · We will discontinue antibiotics  · F/u blood cultures  Medical Decision Making/Summary/Discussion:   · Patient ECHO ( 3/27/19) negative for vegetations  · Repeat blood cultures x 3 ( 3/27/19) were negative. Infection Control Recommendations   · [place] Precautions  Antimicrobial Stewardship Recommendations     · Simplification of therapy  · Targeted therapy    Coordination of Outpatient Care:   · Estimated Length of IV antimicrobials: TBD  · Patient will need Midline Catheter Insertion: No  · Patient will need PICC line Insertion:No  · Patient will need: Home IV , [place],  SNF,  LTAC: TBD  · Patient will need outpatient wound care:No    Chief complaint/reason for consultation:   · Chest pain, SOB  · 1/2 blood cultures grew viridians streptococci    History of Present Illness:   Jhoan Lobo is a 21y.o.-year-old  male who was initially admitted on 3/23/2019. Patient seen at the request of . INITIAL HISTORY:    Pt with a past medical history of hemoglobin SC. He came to the [facility]. V's with complaints of chest pain, shortness of breath. He states that he is having severe chest pain from past 3 weeks. He describes the pain as shooting, 9 out of 10, worsens on deep inspiration. He describes the pain worsens on palpation. Also described shortness of breath because he is unable to take deep breaths. Denies any cough, fever, sore throat. States his chest pain didn't resolve since the admission.     States he is having frequent chills not associated with shakes 4-5 times in a day from past 3-4 days. He states that he was recently hospitalized in Nov 2018 at [address]. V's for similar complaints, but his symptoms never resolved. During my evaluation, patient is afebrile, hemodynamically stable with respiratory rate in 18, heart rate in the 80s, blood pressure with systolics in the 270V and diastolics in 46G, saturating at 100% on room air, leukocytosis with WBC 14.5 --11.6, hemoglobin 12.4 --10.8 lactic dehydrogenase 328--335--299--330, bilirubin 3.84--- 3.34, stable reticulocyte count. Patient hemolytic indices including LDH and bilirubin and hemoglobin are stable,   CT of the chest 3/24/19 showed residual bilateral basilar changes from prior pneumonia in Nov 2018. Some areas are adjacent to plural area. Pain could be pleuritic in nature. CURRENT EVALUATION :  3/28/19    Patient is not in room during my visit. We will round again. Afebrile  Hemodynamically stable    Hemolytic indices stable    Repeat B/C 3/3  ( 3/27/19)- no growth till now   TTE is negative for vegetations    Possible contamination  We will discontinue antibiotics and follow up       Cultures:    Blood:    3-27-19( repeat )  3/3 - no growth till now       3-24-19   · 1/2 Viridians streptococcus group     Discussed with patient, RN, family. I have personally reviewed the past medical history, past surgical history, medications, social history, and family history, and I have updated the database accordingly.   Past Medical History:     Past Medical History:   Diagnosis Date    Chest pain     History of blood transfusion 2x    Sickle cell anemia (HCC)     Sickle cell disease, type SC ([facility] birth    Unspecified diseases of blood and blood-forming organs Sickle Cell       Past Surgical  History:     Past Surgical History:   Procedure Laterality Date    CHOLECYSTECTOMY, LAPAROSCOPIC  1990    KNEE SURGERY Left 2008    cut by glass,     LAPAROSCOPIC SPLENECTOMY Left 9/2007   Merida PENIS SURGERY  [id number] ASPIRATION (PRIAPISM)       Medications:      cefTRIAXone (ROCEPHIN) IV  1 g Intravenous Q24H    HYDROmorphone  0.5 mg Intravenous Once    sodium chloride flush  10 mL Intravenous 2 times per day    enoxaparin  40 mg Subcutaneous Daily    aspirin  324 mg Oral Daily       Social History:     Social History     Socioeconomic History    Marital status: Single     Spouse name: Not on file    Number of children: Not on file    Years of education: Not on file    Highest education level: Not on file   Occupational History    Not on file   Social Needs    Financial resource strain: Not on file    Food insecurity:     Worry: Not on file     Inability: Not on file    Transportation needs:     Medical: Not on file     Non-medical: Not on file   Tobacco Use    Smoking status: Current Every Day Smoker     Packs/day: 1.00     Years: 2.00     Pack years: 2.00     Types: Cigarettes    Tobacco comment: marajuana q week   Substance and Sexual Activity    Alcohol use: No    Drug use: Yes     Frequency: 1.0 times per week     Comment: Isrrael Naylor, 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    Sexual activity: Yes     Partners: Female     Birth control/protection: Condom   Lifestyle    Physical activity:     Days per week: Not on file     Minutes per session: Not on file    Stress: Not on file   Relationships    Social connections:     Talks on phone: Not on file     Gets together: Not on file     Attends [employer] service: Not on file     Active member of club or organization: Not on file     Attends meetings of clubs or organizations: Not on file     Relationship status: Not on file    Intimate partner violence:     Fear of current or ex partner: Not on file     Emotionally abused: Not on file     Physically abused: Not on file     Forced sexual activity: Not on file   Other Topics Concern    Not on file   Social History Narrative    Not on file       Family History:     Family History   Problem Relation Age of Onset    Sickle Cell Anemia Brother         Allergies:   Patient has no known allergies. Review of Systems:   Constitutional: No fevers or chills. No systemic complaints  Head: No headaches  Eyes: No double vision or blurry vision. No conjunctival inflammation. ENT: No sore throat or runny nose. . No hearing loss, tinnitus or vertigo. Cardiovascular complaining of chest pain   Abdomen: No nausea, vomiting, diarrhea, or abdominal pain. Salma Champion No cramps. Genitourinary: No increased urinary frequency, or dysuria. No hematuria. No suprapubic or CVA pain  Musculoskeletal :  complaining of CHEST  pain  Hematologic: No bleeding or bruising. Neurologic: No headache, weakness, numbness, or tingling. Integument: No rash, no ulcers. Psychiatric: No depression. Endocrine: No polyuria, no polydipsia, no polyphagia. Physical Examination :     Patient Vitals for the past 8 hrs:   BP Temp Temp src Pulse Resp SpO2   03/28/19 0739 (!) 154/89 98.6 °F (37 °C) Oral 81 17 97 %   03/28/19 0157 125/82 97.7 °F (36.5 °C) Oral 69 20 99 %     General Appearance: Awake, alert, and in no apparent distress  Head:  Normocephalic, no trauma  Eyes:anicteric; conjunctivae pink. No embolic phenomena. ENT: Oropharynx clear, without erythema, exudate, or thrush. No tenderness of sinuses. Mouth/throat: good oral dayo, caries of left upper, lower, right upper, lower premolar possible caries. Neck:Supple, without lymphadenopathy. Thyroid normal, No bruits. Pulmonary/Chest:mild basal crackles in the right lower lobe  Cardiovascular tenderness in the right lower part of the chest to palpation   Abdomen: Soft, non tender. Bowel sounds normal. No organomegaly  All four Extremities: No cyanosis, clubbing, edema, or effusions. Neurologic: No gross sensory or motor deficits. Skin: Warm and dry with good turgor. No signs of peripheral arterial or venous insufficiency. No ulcerations. No open wounds.     Medical Decision Making -Laboratory:   I have independently reviewed/ordered the following labs:    CBC with Differential:   Recent Labs     03/28/19  0522   HGB 10.6*     BMP:   No results for input(s): NA, K, CL, CO2, BUN, CREATININE, MG in the last 72 hours. Invalid input(s): CA  Hepatic Function Panel:   Recent Labs     03/27/19  0549 03/28/19  0522   BILITOT 3.34* 2.59*     No results for input(s): RPR in the last 72 hours. No results for input(s): HIV in the last 72 hours. No results for input(s): BC in the last 72 hours. Lab Results   Component Value Date    MUCUS NOT REPORTED 03/28/2014    RBC 3.43 03/25/2019    RBC 4.09 10/17/2011    TRICHOMONAS NOT REPORTED 03/28/2014    WBC 11.6 03/25/2019    YEAST NOT REPORTED 03/28/2014    TURBIDITY CLEAR 05/12/2015     Lab Results   Component Value Date    CREATININE 0.60 03/25/2019    GLUCOSE 95 03/25/2019       Medical Decision Making-Imaging:     CT CHEST PULMONARY EMBOLISM W CONTRAST  3-24-19        Impression   No evidence of an acute pulmonary embolus.       Mild patchy middle lobe and bilateral lower lobe airspace disease.  This may   represent pneumonia and/or atelectasis. Doreene Jacy is trace right pleural fluid.               ECHO : 3/27/19  CONCLUSIONS    Left ventricle is mildly enlarged. Global left ventricular systolic function  is normal. Estimated ejection fraction is 55 % . Left atrium is moderately dilated. Right atrium is moderately dilated . Mildly dilated right ventricular cavity. Right ventricular function appears  normal .  IVC Increased diameter, but still has inspiratory variation suggesting upper  normal or mildly elevated RA filling pressure (i.e. CVP) . No vegetations    Medical Decision Making-Other: Thank you for allowing us to participate in the care of this patient. Please call with questions. Aziza Buckner MD     ATTESTATION:    I have discussed the case, including pertinent history and exam findings with the residents.  I have seen and examined the patient and the key elements of the encounter have been performed by me. I have reviewed the laboratory data, other diagnostic studies and discussed them with the residents. I have updated the medical record where necessary. I agree with the assessment, plan and orders as documented by the resident.     Michael Colon MD.    Pager: [phone] - Office: [phone]

## 2019-03-28 NOTE — PROGRESS NOTES
Progress Note    Impression :   1. Viridians streptococci bacteremia (1/2) on 3-24-19  2. Dental caries  3. Hb Sc disease  4. Acute chest syndrome (vaso occlusive pain crisis)  5. CT chest 3-24-19 with residual changes from prior RLL pneumonia 11-1-18     Recommendations:   · Blood cultures = contamination  · We will discontinue IV antibiotics  · Augmentin 875 mg po BID x 10 days  · OK to D/C  Medical Decision Making/Summary/Discussion:   · Patient ECHO ( 3/27/19) negative for vegetations  · Repeat blood cultures x 3 ( 3/27/19) were negative. Infection Control Recommendations   · [place] Precautions  Antimicrobial Stewardship Recommendations      · Simplification of therapy  · Targeted therapy     Coordination of Outpatient Care:   · Estimated Length of IV antimicrobials: TBD  · Patient will need Midline Catheter Insertion: No  · Patient will need PICC line Insertion:No  · Patient will need: Home IV , [place],  SNF,  LTAC: TBD  · Patient will need outpatient wound care: No     Chief complaint/reason for consultation:   · Chest pain, SOB  · 1/2 blood cultures grew viridians streptococci     History of Present Illness:   Chetan Cordero is a 21y.o.-year-old  male who was initially admitted on 3/23/2019. Patient seen at the request of .     INITIAL HISTORY:     Pt with a past medical history of hemoglobin SC. He came to the [address] [place] [address]. 's with complaints of chest pain, shortness of breath.     He states that he is having severe chest pain from past 3 weeks. He describes the pain as shooting, 9 out of 10, worsens on deep inspiration. He describes the pain worsens on palpation. Also described shortness of breath because he is unable to take deep breaths. Denies any cough, fever, sore throat.   States his chest pain didn't resolve since the admission.     States he is having frequent chills not associated with shakes 4-5 times in a day from past 3-4 days.     He states that he was file   Other Topics Concern    Not on file   Social History Narrative    Not on file            Family History:      Family History         Family History   Problem Relation Age of Onset    Sickle Cell Anemia Brother              Allergies:   Patient has no known allergies.      Review of Systems:   Constitutional: No fevers or chills. No systemic complaints  Head: No headaches  Eyes: No double vision or blurry vision. No conjunctival inflammation. ENT: No sore throat or runny nose. . No hearing loss, tinnitus or vertigo. Cardiovascular complaining of chest pain   Abdomen: No nausea, vomiting, diarrhea, or abdominal pain. Cruzito Rafael No cramps. Genitourinary: No increased urinary frequency, or dysuria. No hematuria. No suprapubic or CVA pain  Musculoskeletal :  complaining of CHEST  pain  Hematologic: No bleeding or bruising. Neurologic: No headache, weakness, numbness, or tingling. Integument: No rash, no ulcers. Psychiatric: No depression. Endocrine: No polyuria, no polydipsia, no polyphagia.     Physical Examination :      Patient Vitals for the past 8 hrs:    BP Temp Temp src Pulse Resp SpO2   03/28/19 0739 (!) 154/89 98.6 °F (37 °C) Oral 81 17 97 %   03/28/19 0157 125/82 97.7 °F (36.5 °C) Oral 69 20 99 %      General Appearance: Awake, alert, and in no apparent distress  Head:  Normocephalic, no trauma  Eyes:anicteric; conjunctivae pink. No embolic phenomena. ENT: Oropharynx clear, without erythema, exudate, or thrush. No tenderness of sinuses. Mouth/throat: good oral dayo, caries of left upper, lower, right upper, lower premolar possible caries. Neck:Supple, without lymphadenopathy. Thyroid normal, No bruits. Pulmonary/Chest:mild basal crackles in the right lower lobe  Cardiovascular tenderness in the right lower part of the chest to palpation   Abdomen: Soft, non tender. Bowel sounds normal. No organomegaly  All four Extremities: No cyanosis, clubbing, edema, or effusions.   Neurologic: No gross sensory or motor deficits. Skin: Warm and dry with good turgor. No signs of peripheral arterial or venous insufficiency. No ulcerations. No open wounds.     Medical Decision Making -Laboratory:   I have independently reviewed/ordered the following labs:     CBC with Differential:       Recent Labs     03/28/19  0522   HGB 10.6*      BMP:   No results for input(s): NA, K, CL, CO2, BUN, CREATININE, MG in the last 72 hours.     Invalid input(s): CA  Hepatic Function Panel:        Recent Labs     03/27/19  0549 03/28/19  0522   BILITOT 3.34* 2.59*      No results for input(s): RPR in the last 72 hours. No results for input(s): HIV in the last 72 hours. No results for input(s): BC in the last 72 hours. Lab Results   Component Value Date     MUCUS NOT REPORTED 03/28/2014     RBC 3.43 03/25/2019     RBC 4.09 10/17/2011     TRICHOMONAS NOT REPORTED 03/28/2014     WBC 11.6 03/25/2019     YEAST NOT REPORTED 03/28/2014     TURBIDITY CLEAR 05/12/2015            Lab Results   Component Value Date     CREATININE 0.60 03/25/2019     GLUCOSE 95 03/25/2019         Medical Decision Making-Imaging:      CT CHEST PULMONARY EMBOLISM W CONTRAST  3-24-19         Impression   No evidence of an acute pulmonary embolus.       Mild patchy middle lobe and bilateral lower lobe airspace disease.  This may   represent pneumonia and/or atelectasis. Lesa Peaches is trace right pleural fluid.                 ECHO : 3/27/19  CONCLUSIONS    Left ventricle is mildly enlarged. Global left ventricular systolic function  is normal. Estimated ejection fraction is 55 % . Left atrium is moderately dilated. Right atrium is moderately dilated . Mildly dilated right ventricular cavity. Right ventricular function appears  normal .  IVC Increased diameter, but still has inspiratory variation suggesting upper  normal or mildly elevated RA filling pressure (i.e. CVP) .   No vegetations     Medical Decision Making-Other:         Thank you for allowing us to participate in the care of this patient. Please call with questions.     Seamus Zambrano MD      ATTESTATION:     I have discussed the case, including pertinent history and exam findings with the residents. I have seen and examined the patient and the key elements of the encounter have been performed by me. I have reviewed the laboratory data, other diagnostic studies and discussed them with the residents.  I have updated the medical record where necessary.     I agree with the assessment, plan and orders as documented by the resident.  Bernardino Soler MD.     Pager: [phone] - Office: [phone]

## 2019-03-28 NOTE — PROGRESS NOTES
Went over discharge instructions, including follow-up appointments and medications. Patient verbalized understanding. IV removed. Waiting on meds-to-beds.

## 2019-03-28 NOTE — CARE COORDINATION
Transition Planning:  Met with pt inquired on his scheduled appt for later today with [facility]. He informs he cancelled and rescheduled for 4/2 @ 1330. Writer confirms this is updated on the AVS.  Pt requesting pain med, informed bedside nurse.

## 2019-03-28 NOTE — PROGRESS NOTES
(ROCEPHIN) 1 g IVPB in 50 mL D5W minibag  1 g Intravenous Q24H Katie Amin MD   Stopped at 03/27/19 1820    influenza quadrivalent split vaccine (FLUZONE;FLUARIX;FLULAVAL;AFLURIA) injection 0.5 mL  0.5 mL Intramuscular Once Katie Amin MD        HYDROmorphone (DILAUDID) injection 0.5 mg  0.5 mg Intravenous Once Mikel Bradford MD   Stopped at 03/24/19 0130    0.9 % sodium chloride infusion   Intravenous Continuous Cristóbal P Blood,  mL/hr at 03/28/19 0230      sodium chloride flush 0.9 % injection 10 mL  10 mL Intravenous 2 times per day RODDY Cedeno CNP        sodium chloride flush 0.9 % injection 10 mL  10 mL Intravenous PRN RODDY Cedeno CNP        magnesium hydroxide (MILK OF MAGNESIA) 400 MG/5ML suspension 30 mL  30 mL Oral Daily PRN RODDY Cedeno CNP        ondansetron (ZOFRAN) injection 4 mg  4 mg Intravenous Q6H PRN RODDY Cedeno CNP        nicotine (NICODERM CQ) 21 MG/24HR 1 patch  1 patch Transdermal Daily PRN RODDY Cedeno CNP        enoxaparin (LOVENOX) injection 40 mg  40 mg Subcutaneous Daily RODDY Cedeno CNP   40 mg at 03/27/19 0848    acetaminophen (TYLENOL) tablet 650 mg  650 mg Oral Q4H PRN RODDY Cedeno CNP        aspirin chewable tablet 324 mg  324 mg Oral Daily RODDY Cedeno CNP   324 mg at 03/27/19 0839    HYDROmorphone (DILAUDID) injection 1 mg  1 mg Intravenous Q3H PRN Cristóbal P Blood, DO   1 mg at 03/28/19 0551    oxyCODONE-acetaminophen (PERCOCET) 5-325 MG per tablet 1 tablet  1 tablet Oral Q4H PRN Cristóbal P Blood, DO        oxyCODONE-acetaminophen (PERCOCET) 5-325 MG per tablet 2 tablet  2 tablet Oral Q4H PRN Cristóbal P Blood, DO   2 tablet at 03/28/19 0308       Allergies:  Patient has no known allergies. Social History:   reports that he has been smoking cigarettes. He has a 2.00 pack-year smoking history.  He does not have any smokeless tobacco normal S1, S2, no murmurs  Abdomen - soft, nontender, nondistended, no masses or organomegaly   Neurological - alert, oriented, normal speech, no focal findings or movement disorder noted   Musculoskeletal - no joint tenderness, deformity or swelling   Extremities - peripheral pulses normal, no pedal edema, no clubbing or cyanosis   Skin - normal coloration and turgor, no rashes, no suspicious skin lesions noted ,      DATA:      Labs:     CBC:   No results for input(s): WBC, HGB, HCT, PLT in the last 72 hours. BMP:   No results for input(s): NA, K, CO2, BUN, CREATININE, LABGLOM, GLUCOSE in the last 72 hours. Results for orders placed or performed during the hospital encounter of 03/23/19   CULTURE BLOOD #1   Result Value Ref Range    Specimen Description . BLOOD     Special Requests 10ML R AC     Culture (A)      POSITIVE Blood Culture  Results called to and read back by:  Rosemarie Lange. ON 3/26/19 AT 0246      Culture       DIRECT GRAM STAIN FROM BOTTLE:  GRAM POSITIVE COCCI IN CLUSTERS      Culture VIRIDANS STREPTOCOCCUS GROUP (A)    CULTURE BLOOD #2   Result Value Ref Range    Specimen Description . BLOOD     Special Requests 6ML R FA     Culture NO GROWTH 3 DAYS    Respiratory Virus PCR Panel   Result Value Ref Range    Specimen Description . NASOPHARYNGEAL SWAB     Adenovirus PCR Not Detected Not Detected    Coronavirus 229E PCR Not Detected Not Detected    Coronavirus HKU1 PCR Not Detected Not Detected    Coronavirus NL63 PCR Not Detected Not Detected    Coronavirus OC43 PCR Not Detected Not Detected    Human Metapneumovirus PCR Not Detected Not Detected    Rhino/Enterovirus PCR Not Detected Not Detected    Influenza A by PCR Not Detected Not Detected    Influenza A H1 PCR NOT REPORTED Not Detected    Influenza A H1 (2009) PCR NOT REPORTED Not Detected    Influenza A H3 PCR NOT REPORTED Not Detected    Influenza B by PCR Not Detected Not Detected    Parainfluenza 1 PCR Not Detected Not Detected    Parainfluenza 2 PCR Not Detected Not Detected    Parainfluenza 3 PCR Not Detected Not Detected    Parainfluenza 4 PCR Not Detected Not Detected    Resp Syncytial Virus PCR Not Detected Not Detected    B Pertussis by PCR Not Detected Not Detected    Chlamydia pneumoniae By PCR Not Detected Not Detected    Mycoplasma pneumo by PCR Not Detected Not Detected   CULTURE BLOOD #1   Result Value Ref Range    Specimen Description . BLOOD     Special Requests LEFT AC 10ML     Culture NO GROWTH 7 HOURS    Culture blood #2   Result Value Ref Range    Specimen Description . BLOOD     Special Requests LEFT ARM 10ML     Culture NO GROWTH 7 HOURS    CULTURE BLOOD #1   Result Value Ref Range    Specimen Description . BLOOD     Special Requests lft ac 7ml     Culture NO GROWTH 1 HOUR    CBC WITH AUTO DIFFERENTIAL   Result Value Ref Range    WBC 14.5 (H) 3.5 - 11.3 k/uL    RBC 3.91 (L) 4.21 - 5.77 m/uL    Hemoglobin 12.4 (L) 13.0 - 17.0 g/dL    Hematocrit 33.2 (L) 40.7 - 50.3 %    MCV 84.9 82.6 - 102.9 fL    MCH 31.7 25.2 - 33.5 pg    MCHC 37.3 (H) 28.4 - 34.8 g/dL    RDW 14.6 (H) 11.8 - 14.4 %    Platelets 019 657 - 308 k/uL    MPV 12.2 8.1 - 13.5 fL    NRBC Automated 0.9 (H) 0.0 per 100 WBC    Differential Type NOT REPORTED     WBC Morphology NOT REPORTED     RBC Morphology NOT REPORTED     Platelet Estimate NOT REPORTED     Immature Granulocytes 0 0 %    Seg Neutrophils 68 (H) 36 - 66 %    Lymphocytes 23 (L) 24 - 44 %    Monocytes 8 (H) 1 - 7 %    Eosinophils % 0 (L) 1 - 4 %    Basophils 1 0 - 2 %    nRBC 1 (H) 0 per 100 WBC    Absolute Immature Granulocyte 0.00 0.00 - 0.30 k/uL    Segs Absolute 9.85 (H) 1.8 - 7.7 k/uL    Absolute Lymph # 3.34 1.0 - 4.8 k/uL    Absolute Mono # 1.16 (H) 0.1 - 0.8 k/uL    Absolute Eos # 0.00 0.0 - 0.4 k/uL    Basophils # 0.15 0.0 - 0.2 k/uL    Morphology ANISOCYTOSIS PRESENT     Morphology 2+ TARGET CELLS     Morphology 1+ POLYCHROMASIA     Morphology SICKLE CELLS PRESENT    LACTATE DEHYDROGENASE   Result Value Ref Range     (H) 135 - 225 U/L   Reticulocytes   Result Value Ref Range    Retic % 5.7 (H) 0.5 - 1.9 %    Absolute Retic # 0.220 (H) 0.030 - 0.080 M/uL    Immature Retic Fract 29.400 (H) 2.7 - 18.3 %    Retic Hemoglobin 33.4 28.2 - 35.7 pg   Basic Metabolic Panel   Result Value Ref Range    Glucose 86 70 - 99 mg/dL    BUN 4 (L) 6 - 20 mg/dL    CREATININE 0.63 (L) 0.70 - 1.20 mg/dL    Bun/Cre Ratio NOT REPORTED 9 - 20    Calcium 8.8 8.6 - 10.4 mg/dL    Sodium 135 135 - 144 mmol/L    Potassium 3.7 3.7 - 5.3 mmol/L    Chloride 102 98 - 107 mmol/L    CO2 24 20 - 31 mmol/L    Anion Gap 9 9 - 17 mmol/L    GFR Non-African American >60 >60 mL/min    GFR African American >60 >60 mL/min    GFR Comment          GFR Staging NOT REPORTED    TROP/MYOGLOBIN   Result Value Ref Range    Troponin, High Sensitivity <6 0 - 22 ng/L    Troponin T NOT REPORTED <0.03 ng/mL    Troponin Interp NOT REPORTED     Myoglobin 94 (H) 28 - 72 ng/mL   Procalcitonin   Result Value Ref Range    Procalcitonin 0.08 <0.09 ng/mL   Hemoglobinopathy Eval (Electrophoresis)   Result Value Ref Range    Hgb Electrophoresis Interp       Hb ELECTROPHORESIS EXHIBITS HbS AND HbC. PREVIOUSLY CONFIRMED BY ACID GEL ELECTROPHORESIS. 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 Soraida Olsen M.D.     Basic Metabolic Panel w/ Reflex to MG   Result Value Ref Range    Glucose 95 70 - 99 mg/dL    BUN 3 (L) 6 - 20 mg/dL    CREATININE 0.60 (L) 0.70 - 1.20 mg/dL    Bun/Cre Ratio NOT REPORTED 9 - 20    Calcium 8.0 (L) 8.6 - 10.4 mg/dL    Sodium 139 135 - 144 mmol/L    Potassium 3.4 (L) 3.7 - 5.3 mmol/L    Chloride 104 98 - 107 mmol/L    CO2 23 20 - 31 mmol/L    Anion Gap 12 9 - 17 mmol/L    GFR Non-African American >60 >60 mL/min    GFR African American >60 >60 mL/min    GFR Comment          GFR Staging NOT REPORTED    CBC   Result Value Ref Range    WBC 11.6 (H) 3.5 - 11.3 k/uL    RBC 3.43 (L) 4.21 - 5.77 m/uL (Bazett) 407 ms    P Axis 68 degrees    R Axis 34 degrees    T Axis 61 degrees         IMAGING DATA:    Xr Chest Standard (2 Vw)    Result Date: 3/24/2019  EXAMINATION: TWO VIEWS OF THE CHEST 3/24/2019 12:24 am COMPARISON: None. HISTORY: ORDERING SYSTEM PROVIDED HISTORY: rule out acute chest syndrome TECHNOLOGIST PROVIDED HISTORY: rule out acute chest syndrome Right-sided chest pain, chest tightness and difficulty breathing for the past 5 days. Prior studies including chest x-ray 10/31/2018 and CT chest 11/01/2018 FINDINGS: There is mild patchy airspace disease in the middle lobe and right lower lobe. There is adjacent pleural thickening and tenting of the right hemidiaphragm. Lungs are otherwise clear. Heart size and configuration are normal.     Right basilar pleural and parenchymal disease may be acute/recurrent. Residual scarring from the acute disease noted on 10/31/2018 is not excluded. Xr Shoulder Right (min 2 Views)    Result Date: 3/11/2019  EXAMINATION: 3 XRAY VIEWS OF THE RIGHT SHOULDER 3/11/2019 8:17 pm COMPARISON: None. HISTORY: ORDERING SYSTEM PROVIDED HISTORY: rt arm pain TECHNOLOGIST PROVIDED HISTORY: rt arm pain FINDINGS: 3 images of the right shoulder were provided. Alignment is normal. Flattening of the superolateral aspect of the right humeral head is noted raising the question of prior Hill-Sachs deformity. There is no acute fracture. Limited evaluation of the right lung demonstrates no focal abnormality     No acute abnormality. Ct Chest Pulmonary Embolism W Contrast    Result Date: 3/24/2019  EXAMINATION: CTA OF THE CHEST 3/24/2019 1:11 am TECHNIQUE: CTA of the chest was performed after the administration of intravenous contrast.  Multiplanar reformatted images are provided for review. MIP images are provided for review.  Dose modulation, iterative reconstruction, and/or weight based adjustment of the mA/kV was utilized to reduce the radiation dose to as low as reasonably Venous Scan Upper Right. Indications for Study:Arm pain. Patient Status: In Patient. Conclusions   Summary   No evidence of superficial or deep venous thrombosis in the right upper  extremity. Signature   ----------------------------------------------------------------  Electronically signed by Shay Dailey(Interpreting physician)  on 03/12/2019 09:37 PM  ----------------------------------------------------------------  Findings:   Right Impression:  Right internal jugular, subclavian, axillary, brachial, ulnar, radial,  cephalic and basilic veins are compressible with normal doppler  responses. Risk Factors History +---------+----+-----------------------------------------------------------+ ! Diagnosis! Date! Comments                                                   ! +---------+----+-----------------------------------------------------------+ ! Other    ! ! Sickle Cell Anemia                                         ! +---------+----+-----------------------------------------------------------+ Velocities are measured in cm/s ; Diameters are measured in cm Right UE Vein Measurements 2D Measurements +------------------------------------+----------+---------------+----------+ ! Location                            ! Visualized! Compressibility! Thrombosis! +------------------------------------+----------+---------------+----------+ ! Prox IJV                            ! Yes       ! Yes            ! None      ! +------------------------------------+----------+---------------+----------+ ! Dist IJV                            ! Yes       ! Yes            ! None      ! +------------------------------------+----------+---------------+----------+ ! Prox SCV                            ! Yes       ! Yes            ! None      ! +------------------------------------+----------+---------------+----------+ ! Dist SCV                            ! Yes       ! Yes            ! None      ! +------------------------------------+----------+---------------+----------+ ! Prox Axillary                       ! Yes       ! Yes            ! None      ! +------------------------------------+----------+---------------+----------+ ! Dist Axillary                       ! Yes       ! Yes            ! None      ! +------------------------------------+----------+---------------+----------+ ! Prox Brachial                       !Yes       ! Yes            ! None      ! +------------------------------------+----------+---------------+----------+ ! Dist Brachial                       !Yes       ! Yes            ! None      ! +------------------------------------+----------+---------------+----------+ ! Prox Radial                         !Yes       ! Yes            ! None      ! +------------------------------------+----------+---------------+----------+ ! Dist Radial                         !Yes       ! Yes            ! None      ! +------------------------------------+----------+---------------+----------+ ! Prox Ulnar                          ! Yes       ! Yes            ! None      ! +------------------------------------+----------+---------------+----------+ ! Dist Ulnar                          ! Yes       ! Yes            ! None      ! +------------------------------------+----------+---------------+----------+ ! Basilic at UA                       ! Yes       ! Yes            ! None      ! +------------------------------------+----------+---------------+----------+ ! Basilic at AF                       ! Yes       ! Yes            ! None      ! +------------------------------------+----------+---------------+----------+ ! Basilic at [address]                       ! Yes       ! Yes            ! None      ! +------------------------------------+----------+---------------+----------+ ! Cephalic at                       ! Yes       ! Yes            ! None      ! +------------------------------------+----------+---------------+----------+ ! Cephalic at AF !Yes       !Yes            ! None      ! +------------------------------------+----------+---------------+----------+ ! Cephalic at [address]                      ! Yes       ! Yes            ! None      ! +------------------------------------+----------+---------------+----------+ Doppler Measurements +-------------------------+-----------------------+------------------------+ ! Location                 ! Signal                 !Reflux                  ! +-------------------------+-----------------------+------------------------+ ! IJV                      ! Phasic                 !                        ! +-------------------------+-----------------------+------------------------+ ! SCV                      ! Phasic                 !                        ! +-------------------------+-----------------------+------------------------+ ! Axillary                 ! Phasic                 !                        ! +-------------------------+-----------------------+------------------------+      Primary Problem  Sickle cell crisis [facility])    Active Hospital Problems    Diagnosis Date Noted    Sickle cell crisis ([facility] [D57.00] 03/27/2019    Viridans streptococci infection [A49.1]     Septicemia ([facility] [A41.9]     Chest pain, pleuritic [R07.81]     Acute chest syndrome ([facility] [D57.01] 03/24/2019    Tobacco abuse [Z72.0] 10/24/2018    Sickle cell anemia (HCC) [D57.1]          RECOMMENDATIONS:  Reviewed clinical data  Continue to monitor hematocrit and hemolytic indicis. Do not suspect acute chest syndrome. Patient gradually improving. Continue symptom management IV fluid and pain control      Discussed with patient and Nurse. Thank you for asking us to see this patient.           Jaycob Dockery MD        This note is created with the assistance of a speech recognition program.  While intending to generate a document that actually reflects the content of the visit, the document can still have some errors including those of syntax and sound a like substitutions which may escape proof reading. It such instances, actual meaning can be extrapolated by contextual diversion.

## 2019-03-28 NOTE — PROGRESS NOTES
Pt. States his ride cannot wait for meds to be delivered to bedside.  OP pharmacy notified of patient picking up meds on his way walking out of the hospital.

## 2019-03-28 NOTE — DISCHARGE INSTR - COC
D72.829    Right arm pain M79.601    Acute chest syndrome (HCC) D57.01    Viridans streptococci infection A49.1    Septicemia ([facility]) A41.9    Chest pain, pleuritic R07.81    Sickle cell crisis ([facility]) D57.00       Isolation/Infection:   Isolation          No Isolation            Nurse Assessment:  Last Vital Signs: BP (!) 154/89   Pulse 81   Temp 98.6 °F (37 °C) (Oral)   Resp 17   Ht 6' 2\" (1.88 m)   Wt 194 lb 3 oz (88.1 kg)   SpO2 97%   BMI 24.93 kg/m²     Last documented pain score (0-10 scale): Pain Level: 7  Last Weight:   Wt Readings from Last 1 Encounters:   03/27/19 194 lb 3 oz (88.1 kg)     Mental Status:  {IP PT MENTAL STATUS:20030}    IV Access:  { AURA IV ACCESS:513434302}    Nursing Mobility/ADLs:  Walking   {P DME ZEFN:169118602}  Transfer  {[facility] DME RXKF:703755123}  Bathing  {[facility] DME LTYB:241300187}  Dressing  {[facility] DME CUCV:469644232}  Toileting  {[facility] DME HGUF:599002504}  Feeding  {[facility] DME XTZT:571257290}  Med Admin  {[facility] DME DAKB:783446869}  Med Delivery   { AURA MED Delivery:314347852}    Wound Care Documentation and Therapy:        Elimination:  Continence:   · Bowel: {YES / ME:60884}  · Bladder: {YES / CV:78242}  Urinary Catheter: {Urinary Catheter:759742253}   Colostomy/Ileostomy/Ileal Conduit: {YES / ZH:88152}       Date of Last BM: ***    Intake/Output Summary (Last 24 hours) at 3/28/2019 1218  Last data filed at 3/28/2019 0553  Gross per 24 hour   Intake 1400 ml   Output --   Net 1400 ml     I/O last 3 completed shifts:   In: 1400 [I.V.:1400]  Out: -     Safety Concerns:     [employer] Safety Concerns:422798843}    Impairments/Disabilities:      [employer] Impairments/Disabilities:229791144}    Nutrition Therapy:  Current Nutrition Therapy:   [employer] Diet List:414871069}    Routes of Feeding: {CHP DME Other Feedings:867357232}  Liquids: {Slp liquid thickness:24870}  Daily Fluid Restriction: {CHP DME Yes amt example:374794966}  Last Modified Barium Swallow with Video (Video Swallowing Test): {Done Not Done XMUS:370844213}    Treatments at the Time of Hospital Discharge:   Respiratory Treatments: ***  Oxygen Therapy:  {Therapy; copd oxygen:55540}  Ventilator:    {[facility] Vent LRSJ:330593045}    Rehab Therapies: {THERAPEUTIC INTERVENTION:4675145637}  Weight Bearing Status/Restrictions: {[facility] Weight Bearin}  Other Medical Equipment (for information only, NOT a DME order):  {EQUIPMENT:248905005}  Other Treatments: ***    Patient's personal belongings (please select all that are sent with patient):  {[facility] DME Belongings:658031733}    RN SIGNATURE:  {Esignature:169209705}    CASE MANAGEMENT/SOCIAL WORK SECTION    Inpatient Status Date: ***    Readmission Risk Assessment Score:  Readmission Risk              Risk of Unplanned Readmission:        14           Discharging to Facility/ Agency   · Name:   · Address:  · Phone:  · Fax:    Dialysis Facility (if applicable)   · Name:  · Address:  · Dialysis Schedule:  · Phone:  · Fax:    / signature: {Esignature:134705866}    PHYSICIAN SECTION    Prognosis: {Prognosis:7347148494}    Condition at Discharge: [address] [place] [address] Patient Condition:936108481}    Rehab Potential (if transferring to Rehab): {Prognosis:0713067663}    Recommended Labs or Other Treatments After Discharge: ***    Physician Certification: I certify the above information and transfer of Tiffanie Wei  is necessary for the continuing treatment of the diagnosis listed and that he requires {Admit to Appropriate Level of Care:96125} for {GREATER/LESS:188839337} 30 days.      Update Admission H&P: {CHP DME Changes in SEQQK:216303317}    PHYSICIAN SIGNATURE:  {Esignature:232597795}

## 2019-03-28 NOTE — DISCHARGE SUMMARY
Jose Juan Weaver 19    Discharge Summary     Patient ID: Tiffanie Wei  :  1995   MRN: [id number]     ACCOUNT:  [de-identified]   Patient's PCP: No primary care provider on file. Admit Date: 3/23/2019   Discharge Date: 3/28/2019  Length of Stay: 4  Code Status:  Prior  Admitting Physician: Milton Landon, DO  Discharge Physician: Megan Perez MD     Active Discharge Diagnoses:     Hospital Problem Lists:  Principal Problem:    Sickle cell crisis [facility])  Active Problems:    Sickle cell anemia (HCC)    Tobacco abuse    Acute chest syndrome ([facility]    Viridans streptococci infection    Septicemia ([facility]    Chest pain, pleuritic  Resolved Problems:    Chest pain      Admission Condition:  fair     Discharged Condition: stable    Hospital Stay:     Hospital Course: The patient is a 23 y. o.  AA male who presented with chest pain and shoulder pain.  He had the shoulder pain 1st and was hospitalized for sickle crisis earlier this month.  Few days after discharge he developed constant pleuritic cp.  Took motrin without relief and ran out of percocet from last admission.  Denies f/c/s/s/cough. Concern for acute chest syndrome. Hematology consulted, ACS ruled out. Found to have 1/2 cultures positive for strep viridans. ID consulted, determined to be contaminant. Pain improved with IVF and pain medications.        Significant therapeutic interventions:   - Hematology following - low suspicion for ACS  - Continue IVF  - Pain control  - Monitor reticulocyte counts and LDH  - Follow up blood cultures - 1 of 2 positive - strep viridans  - ID consulted - likely contaminant  - Repeat blood culture        Significant Diagnostic Studies:   Labs / Micro:  CBC:   Lab Results   Component Value Date    WBC 11.6 2019    RBC 3.43 2019    RBC 4.09 10/17/2011    HGB 10.6 2019    HCT 29.5 2019    MCV 86.0 2019    MCH 31.5 2019    MCHC 36.6 03/25/2019    RDW 14.9 03/25/2019     03/25/2019     10/17/2011     BMP:    Lab Results   Component Value Date    GLUCOSE 95 03/25/2019     03/25/2019    K 3.4 03/25/2019     03/25/2019    CO2 23 03/25/2019    ANIONGAP 12 03/25/2019    BUN 3 03/25/2019    CREATININE 0.60 03/25/2019    BUNCRER NOT REPORTED 03/25/2019    CALCIUM 8.0 03/25/2019    LABGLOM >60 03/25/2019    GFRAA >60 03/25/2019    GFR      03/25/2019    GFR NOT REPORTED 03/25/2019     HFP:    Lab Results   Component Value Date    PROT 7.6 03/10/2019     CMP:    Lab Results   Component Value Date    GLUCOSE 95 03/25/2019     03/25/2019    K 3.4 03/25/2019     03/25/2019    CO2 23 03/25/2019    BUN 3 03/25/2019    CREATININE 0.60 03/25/2019    ANIONGAP 12 03/25/2019    ALKPHOS 75 03/10/2019    ALT 13 03/10/2019    AST 35 03/10/2019    BILITOT 2.59 03/28/2019    LABALBU 4.7 03/10/2019    ALBUMIN 1.6 03/10/2019    LABGLOM >60 03/25/2019    GFRAA >60 03/25/2019    GFR      03/25/2019    GFR NOT REPORTED 03/25/2019    PROT 7.6 03/10/2019    CALCIUM 8.0 03/25/2019     PT/INR:  No results found for: PTINR  PTT:   Lab Results   Component Value Date    APTT 26.9 10/31/2018    APTT 28.7 08/30/2016     FLP:  No results found for: CHOL, TRIG, HDL  U/A:    Lab Results   Component Value Date    COLORU Yellow 08/30/2016    TURBIDITY CLEAR 05/12/2015    SPECGRAV 1.010 08/30/2016    HGBUR NEGATIVE 05/12/2015    PHUR 7.0 08/30/2016    PROTEINU Negative 08/30/2016    GLUCOSEU Negative 08/30/2016    KETUA Negative 08/30/2016    BILIRUBINUR Negative 08/30/2016    UROBILINOGEN 2.0 08/30/2016    NITRU Negative 08/30/2016    LEUKOCYTESUR Negative 08/30/2016       Radiology:    Xr Chest Standard (2 Vw)    Result Date: 3/24/2019  EXAMINATION: TWO VIEWS OF THE CHEST 3/24/2019 12:24 am COMPARISON: None.  HISTORY: ORDERING SYSTEM PROVIDED HISTORY: rule out acute chest syndrome TECHNOLOGIST PROVIDED HISTORY: rule out acute chest syndrome Right-sided chest pain, chest tightness and difficulty breathing for the past 5 days. Prior studies including chest x-ray 10/31/2018 and CT chest 11/01/2018 FINDINGS: There is mild patchy airspace disease in the middle lobe and right lower lobe. There is adjacent pleural thickening and tenting of the right hemidiaphragm. Lungs are otherwise clear. Heart size and configuration are normal.     Right basilar pleural and parenchymal disease may be acute/recurrent. Residual scarring from the acute disease noted on 10/31/2018 is not excluded. Xr Shoulder Right (min 2 Views)    Result Date: 3/11/2019  EXAMINATION: 3 XRAY VIEWS OF THE RIGHT SHOULDER 3/11/2019 8:17 pm COMPARISON: None. HISTORY: ORDERING SYSTEM PROVIDED HISTORY: rt arm pain TECHNOLOGIST PROVIDED HISTORY: rt arm pain FINDINGS: 3 images of the right shoulder were provided. Alignment is normal. Flattening of the superolateral aspect of the right humeral head is noted raising the question of prior Hill-Sachs deformity. There is no acute fracture. Limited evaluation of the right lung demonstrates no focal abnormality     No acute abnormality. Ct Chest Pulmonary Embolism W Contrast    Result Date: 3/24/2019  EXAMINATION: CTA OF THE CHEST 3/24/2019 1:11 am TECHNIQUE: CTA of the chest was performed after the administration of intravenous contrast.  Multiplanar reformatted images are provided for review. MIP images are provided for review. Dose modulation, iterative reconstruction, and/or weight based adjustment of the mA/kV was utilized to reduce the radiation dose to as low as reasonably achievable. COMPARISON: 10/24/2018 and 11/01/2018 HISTORY: ORDERING SYSTEM PROVIDED HISTORY: right pleuritic pain.  sickle cell disease, previous PE TECHNOLOGIST PROVIDED HISTORY: Ordering Physician Provided Reason for Exam: Chest Pain (Pt has had symptoms since he was admited to hospital 3/2019) Acuity: Unknown Type of Exam: Unknown FINDINGS: Pulmonary Arteries: Pulmonary arteries are adequately opacified for evaluation. No evidence of intraluminal filling defect to suggest pulmonary embolism. Main pulmonary artery is normal in caliber. Mediastinum: No evidence of mediastinal lymphadenopathy. The heart and pericardium demonstrate no acute abnormality. There is no acute abnormality of the thoracic aorta. Lungs/pleura: There is mild patchy middle lobe and bilateral lower lobe airspace disease. There is trace right pleural fluid. No pneumothorax. Upper Abdomen: Limited images of the upper abdomen are remarkable for splenectomy. Soft Tissues/Bones: No acute bone or soft tissue abnormality. No evidence of an acute pulmonary embolus. Mild patchy middle lobe and bilateral lower lobe airspace disease. This may represent pneumonia and/or atelectasis. There is trace right pleural fluid. Vl Dup Upper Extremity Venous Right    Result Date: 3/12/2019    [facility]  Vascular Upper Extremities Veins Procedure   Patient Name  YEIMI      Date of Study         03/12/2019                HEATHER SUBRAMANIAN   Date of Birth 1995  Gender                Male   Age           21 year(s)  Race                  Black   Room Number   2009   Corporate ID  [id number]  #   Patient Prema  [de-identified]  #   MR #          [id number]     Sonographer           Kristin Shoemaker   Accession #   [id number]   Interpreting          Shay Dailey                            Physician   Referring                 Referring Physician   Von Siddiqui, *  Nurse  Practitioner  Procedure Type of Study:   Veins: Upper Extremities Veins, Venous Scan Upper Right. Indications for Study:Arm pain. Patient Status: In Patient. Conclusions   Summary   No evidence of superficial or deep venous thrombosis in the right upper  extremity.    Signature   ----------------------------------------------------------------  Electronically signed by Shay Dailey(Interpreting physician)  on 03/12/2019 09:37 PM ----------------------------------------------------------------  Findings:   Right Impression:  Right internal jugular, subclavian, axillary, brachial, ulnar, radial,  cephalic and basilic veins are compressible with normal doppler  responses. Risk Factors History +---------+----+-----------------------------------------------------------+ ! Diagnosis! Date! Comments                                                   ! +---------+----+-----------------------------------------------------------+ ! Other    ! ! Sickle Cell Anemia                                         ! +---------+----+-----------------------------------------------------------+ Velocities are measured in cm/s ; Diameters are measured in cm Right UE Vein Measurements 2D Measurements +------------------------------------+----------+---------------+----------+ ! Location                            ! Visualized! Compressibility! Thrombosis! +------------------------------------+----------+---------------+----------+ ! Prox IJV                            ! Yes       ! Yes            ! None      ! +------------------------------------+----------+---------------+----------+ ! Dist IJV                            ! Yes       ! Yes            ! None      ! +------------------------------------+----------+---------------+----------+ ! Prox SCV                            ! Yes       ! Yes            ! None      ! +------------------------------------+----------+---------------+----------+ ! Dist SCV                            ! Yes       ! Yes            ! None      ! +------------------------------------+----------+---------------+----------+ ! Prox Axillary                       ! Yes       ! Yes            ! None      ! +------------------------------------+----------+---------------+----------+ ! Dist Axillary                       ! Yes       ! Yes            ! None      ! +------------------------------------+----------+---------------+----------+ ! Prox Brachial !Yes       !Yes            ! None      ! +------------------------------------+----------+---------------+----------+ ! Dist Brachial                       !Yes       ! Yes            ! None      ! +------------------------------------+----------+---------------+----------+ ! Prox Radial                         !Yes       ! Yes            ! None      ! +------------------------------------+----------+---------------+----------+ ! Dist Radial                         !Yes       ! Yes            ! None      ! +------------------------------------+----------+---------------+----------+ ! Prox Ulnar                          ! Yes       ! Yes            ! None      ! +------------------------------------+----------+---------------+----------+ ! Dist Ulnar                          ! Yes       ! Yes            ! None      ! +------------------------------------+----------+---------------+----------+ ! Basilic at UA                       ! Yes       ! Yes            ! None      ! +------------------------------------+----------+---------------+----------+ ! Basilic at AF                       ! Yes       ! Yes            ! None      ! +------------------------------------+----------+---------------+----------+ ! Basilic at [address]                       ! Yes       ! Yes            ! None      ! +------------------------------------+----------+---------------+----------+ ! Cephalic at UA                      ! Yes       ! Yes            ! None      ! +------------------------------------+----------+---------------+----------+ ! Cephalic at AF                      ! Yes       ! Yes            ! None      ! +------------------------------------+----------+---------------+----------+ ! Cephalic at [address]                      ! Yes       ! Yes            ! None      ! +------------------------------------+----------+---------------+----------+ Doppler Measurements +-------------------------+-----------------------+------------------------+ ! Location                 ! Signal paper prescription for each of these medications  · oxyCODONE-acetaminophen 5-325 MG per tablet         Time Spent on discharge is  33 mins in patient examination, evaluation, counseling as well as medication reconciliation, prescriptions for required medications, discharge plan and follow up.     Electronically signed by   Amna Young MD  3/28/2019  4:54 PM

## 2019-03-29 LAB — MYCOPLASMA PNEUMONIAE IGM: 0.74

## 2019-03-30 LAB
CULTURE: NORMAL
Lab: NORMAL
SPECIMEN DESCRIPTION: NORMAL

## 2019-04-02 ENCOUNTER — OFFICE VISIT (OUTPATIENT)
Dept: FAMILY MEDICINE CLINIC | Age: 24
End: 2019-04-02
Payer: MEDICAID

## 2019-04-02 VITALS
DIASTOLIC BLOOD PRESSURE: 67 MMHG | TEMPERATURE: 97.2 F | OXYGEN SATURATION: 95 % | BODY MASS INDEX: 24.33 KG/M2 | SYSTOLIC BLOOD PRESSURE: 119 MMHG | HEART RATE: 76 BPM | WEIGHT: 189.6 LBS | HEIGHT: 74 IN

## 2019-04-02 DIAGNOSIS — Z86.2 H/O SICKLE CELL DISEASE: ICD-10-CM

## 2019-04-02 DIAGNOSIS — D57.20 SICKLE CELL-HEMOGLOBIN C DISEASE WITHOUT CRISIS (HCC): Primary | ICD-10-CM

## 2019-04-02 DIAGNOSIS — Z23 NEED FOR VACCINATION: ICD-10-CM

## 2019-04-02 DIAGNOSIS — Z72.0 TOBACCO ABUSE: ICD-10-CM

## 2019-04-02 LAB
CULTURE: NORMAL
Lab: NORMAL
SPECIMEN DESCRIPTION: NORMAL

## 2019-04-02 PROCEDURE — 90715 TDAP VACCINE 7 YRS/> IM: CPT | Performed by: FAMILY MEDICINE

## 2019-04-02 PROCEDURE — 90670 PCV13 VACCINE IM: CPT | Performed by: FAMILY MEDICINE

## 2019-04-02 PROCEDURE — 99211 OFF/OP EST MAY X REQ PHY/QHP: CPT | Performed by: STUDENT IN AN ORGANIZED HEALTH CARE EDUCATION/TRAINING PROGRAM

## 2019-04-02 PROCEDURE — 99203 OFFICE O/P NEW LOW 30 MIN: CPT | Performed by: STUDENT IN AN ORGANIZED HEALTH CARE EDUCATION/TRAINING PROGRAM

## 2019-04-02 ASSESSMENT — ENCOUNTER SYMPTOMS
NAUSEA: 0
ABDOMINAL PAIN: 0
SHORTNESS OF BREATH: 0
VOMITING: 0
COUGH: 0
WHEEZING: 0
CONSTIPATION: 0
DIARRHEA: 0

## 2019-04-02 ASSESSMENT — PATIENT HEALTH QUESTIONNAIRE - PHQ9
1. LITTLE INTEREST OR PLEASURE IN DOING THINGS: 0
SUM OF ALL RESPONSES TO PHQ9 QUESTIONS 1 & 2: 0
SUM OF ALL RESPONSES TO PHQ QUESTIONS 1-9: 0
SUM OF ALL RESPONSES TO PHQ QUESTIONS 1-9: 0
2. FEELING DOWN, DEPRESSED OR HOPELESS: 0

## 2019-04-02 NOTE — PROGRESS NOTES
Attending Physician Statement  I have discussed the care of Brett Griffith, including pertinent history and exam findings,  with the resident. I have reviewed the key elements of all parts of the encounter with the resident. I agree with the assessment, plan and orders as documented by the resident.   (GE Modifier)
Temp 97.2 °F (36.2 °C) (Oral)   Ht 6' 2.02\" (1.88 m)   Wt 189 lb 9.6 oz (86 kg)   SpO2 95%   BMI 24.33 kg/m²       Assessment:      1. 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 H/O sickle cell disease    - [facility] - Amber Young MD, Hematology/Oncology, [address] [place] [address], Kevin Knowles MD, Pain Management, [facility]    3. Need for vaccination    - Tdap (age 6y and older) IM (Boostrix)    4. Tobacco abuse    - nicotine polacrilex (NICORETTE) 2 MG gum; Take 1 each by mouth as needed for Smoking cessation  Dispense: 110 each; Refill: 1    1. Brett received counseling on the following healthy behaviors: medication adherence and tobacco cessation  2. Reviewed prior labs and health maintenance  3. Continue current medications, diet and exercise. 4.  Discussed use, benefit, and side effects of prescribed medications. Barriers to medication compliance addressed. All patient questions answered. Pt voiced understanding. 5.  Patient given educational materials - see patient instructions  6. Was a self-tracking handout given in paper form or via [employer]? No  If yes, see orders or list here. PHQ Scores 4/2/2019   PHQ2 Score 0   PHQ9 Score 0     Interpretation of Total Score Depression Severity: 1-4 = Minimal depression, 5-9 = Mild depression, 10-14 = Moderate depression, 15-19 = Moderately severe depression, 20-27 = Severe depression  Normal    Completed Refills   Requested Prescriptions     Signed Prescriptions Disp Refills    nicotine polacrilex (NICORETTE) 2 MG gum 110 each 1     Sig: Take 1 each by mouth as needed for Smoking cessation       Return in about 3 months (around 7/2/2019) for sickle cell.           Salima Uriostegui MD

## 2019-04-02 NOTE — PATIENT INSTRUCTIONS
Thank you for letting us take care of you today. We hope all your questions were addressed. If a question was overlooked or something else comes to mind after you return home, please contact a member of your Care Team listed below. Please make sure you have a routine office visit set up to follow-up on [address]. Your Care Team at [place] [address] is Team #3  Jhony Mock MD (Faculty)  Jami Torres MD (Faculty  Geoffrey Palmer MD (Resident)  Angelina Adams MD (Resident)   Vincent Simon MD (Resident)  Geoffrey Costello MD (Resident)  Meg Brock MD (Resident)  Luisito Roman LPN  [facility] PABLITO Briseno., [facility] office)  [facility] office)  Doug Moura ([address])  [place], [place] ([address] [facility])  Kyle Ramos, Ph.D., (Behavioral Services)  Ousmane Colon, [facility] (Clinical Pharmacist)     Office phone number: [phone]    If you need to get in right away due to illness, please be advised we have \"Same Day\" appointments available Monday-Friday. Please call us at [phone] option #[phone] to schedule your \"Same Day\" appointment.

## 2019-04-08 ENCOUNTER — TELEPHONE (OUTPATIENT)
Dept: ONCOLOGY | Age: 24
End: 2019-04-08

## 2019-06-10 ENCOUNTER — TELEPHONE (OUTPATIENT)
Dept: ONCOLOGY | Age: 24
End: 2019-06-10

## 2019-06-10 NOTE — TELEPHONE ENCOUNTER
REVIEWED CONSULT TO SCHEDULE PATIENT UNABLE TO SCHEDULE D/T PATIENT HAS BEEN DISCHARGED FROM PRACTICE D/T NO SHOW HISTORY REFERRAL SENT BACK TO [facility]

## 2019-09-08 ENCOUNTER — APPOINTMENT (OUTPATIENT)
Dept: CT IMAGING | Age: 24
DRG: 662 | End: 2019-09-08
Payer: MEDICAID

## 2019-09-08 ENCOUNTER — APPOINTMENT (OUTPATIENT)
Dept: GENERAL RADIOLOGY | Age: 24
DRG: 662 | End: 2019-09-08
Payer: MEDICAID

## 2019-09-08 ENCOUNTER — HOSPITAL ENCOUNTER (INPATIENT)
Age: 24
LOS: 3 days | Discharge: HOME | DRG: 662 | End: 2019-09-11
Attending: EMERGENCY MEDICINE | Admitting: FAMILY MEDICINE
Payer: MEDICAID

## 2019-09-08 DIAGNOSIS — K72.00 ACUTE LIVER FAILURE WITHOUT HEPATIC COMA: Primary | ICD-10-CM

## 2019-09-08 DIAGNOSIS — D57.00 SICKLE CELL CRISIS (HCC): ICD-10-CM

## 2019-09-08 LAB
-: NORMAL
ABSOLUTE EOS #: 0 K/UL (ref 0–0.4)
ABSOLUTE EOS #: 0 K/UL (ref 0–0.4)
ABSOLUTE IMMATURE GRANULOCYTE: 0.65 K/UL (ref 0–0.3)
ABSOLUTE IMMATURE GRANULOCYTE: 0.95 K/UL (ref 0–0.3)
ABSOLUTE LYMPH #: 1.26 K/UL (ref 1–4.8)
ABSOLUTE LYMPH #: 1.74 K/UL (ref 1–4.8)
ABSOLUTE MONO #: 0.87 K/UL (ref 0.1–0.8)
ABSOLUTE MONO #: 2.21 K/UL (ref 0.1–0.8)
ABSOLUTE RETIC #: 0.24 M/UL (ref 0.03–0.08)
ALBUMIN SERPL-MCNC: 4.5 G/DL (ref 3.5–5.2)
ALBUMIN SERPL-MCNC: 5.1 G/DL (ref 3.5–5.2)
ALBUMIN/GLOBULIN RATIO: 1.5 (ref 1–2.5)
ALBUMIN/GLOBULIN RATIO: 1.6 (ref 1–2.5)
ALLEN TEST: ABNORMAL
ALP BLD-CCNC: 107 U/L (ref 40–129)
ALP BLD-CCNC: 89 U/L (ref 40–129)
ALT SERPL-CCNC: 2645 U/L (ref 5–41)
ALT SERPL-CCNC: 2927 U/L (ref 5–41)
AMORPHOUS: NORMAL
ANION GAP SERPL CALCULATED.3IONS-SCNC: 18 MMOL/L (ref 9–17)
ANION GAP: 5 MMOL/L (ref 7–16)
AST SERPL-CCNC: 5439 U/L
AST SERPL-CCNC: >7000 U/L
BACTERIA: NORMAL
BASOPHILS # BLD: 0 % (ref 0–2)
BASOPHILS # BLD: 0 % (ref 0–2)
BASOPHILS ABSOLUTE: 0 K/UL (ref 0–0.2)
BASOPHILS ABSOLUTE: 0 K/UL (ref 0–0.2)
BILIRUB SERPL-MCNC: 7.15 MG/DL (ref 0.3–1.2)
BILIRUB SERPL-MCNC: 8.62 MG/DL (ref 0.3–1.2)
BILIRUBIN DIRECT: 0.62 MG/DL
BILIRUBIN DIRECT: 0.88 MG/DL
BILIRUBIN URINE: NEGATIVE
BILIRUBIN, INDIRECT: 6.53 MG/DL (ref 0–1)
BILIRUBIN, INDIRECT: 7.74 MG/DL (ref 0–1)
BUN BLDV-MCNC: 21 MG/DL (ref 6–20)
BUN/CREAT BLD: ABNORMAL (ref 9–20)
CALCIUM SERPL-MCNC: 9.3 MG/DL (ref 8.6–10.4)
CASTS UA: NORMAL /LPF (ref 0–2)
CASTS UA: NORMAL /LPF (ref 0–2)
CHLORIDE BLD-SCNC: 97 MMOL/L (ref 98–107)
CO2: 25 MMOL/L (ref 20–31)
COLOR: ABNORMAL
COMMENT UA: ABNORMAL
CREAT SERPL-MCNC: 1.12 MG/DL (ref 0.7–1.2)
CRYSTALS, UA: NORMAL /HPF
DIFFERENTIAL TYPE: ABNORMAL
DIFFERENTIAL TYPE: ABNORMAL
EOSINOPHILS RELATIVE PERCENT: 0 % (ref 1–4)
EOSINOPHILS RELATIVE PERCENT: 0 % (ref 1–4)
EPITHELIAL CELLS UA: NORMAL /HPF (ref 0–5)
FIO2: ABNORMAL
FOLATE: >20 NG/ML
GFR AFRICAN AMERICAN: >60 ML/MIN
GFR NON-AFRICAN AMERICAN: >60 ML/MIN
GFR NON-AFRICAN AMERICAN: >60 ML/MIN
GFR SERPL CREATININE-BSD FRML MDRD: >60 ML/MIN
GFR SERPL CREATININE-BSD FRML MDRD: ABNORMAL ML/MIN/{1.73_M2}
GLOBULIN: ABNORMAL G/DL (ref 1.5–3.8)
GLOBULIN: ABNORMAL G/DL (ref 1.5–3.8)
GLUCOSE BLD-MCNC: 116 MG/DL (ref 70–99)
GLUCOSE BLD-MCNC: 84 MG/DL (ref 74–100)
GLUCOSE URINE: NEGATIVE
HAV IGM SER IA-ACNC: NONREACTIVE
HCO3, MIXED: 26.3 MMOL/L (ref 23–29)
HCT VFR BLD CALC: 31.8 % (ref 40.7–50.3)
HCT VFR BLD CALC: 34.2 % (ref 40.7–50.3)
HEMOGLOBIN: 11.3 G/DL (ref 13–17)
HEMOGLOBIN: 12.6 G/DL (ref 13–17)
HEPATITIS B CORE IGM ANTIBODY: NONREACTIVE
HEPATITIS B SURFACE ANTIGEN: NONREACTIVE
HEPATITIS C ANTIBODY: NONREACTIVE
IMMATURE GRANULOCYTES: 3 %
IMMATURE GRANULOCYTES: 3 %
IMMATURE RETIC FRACT: 20.1 % (ref 2.7–18.3)
INR BLD: 1.4
KETONES, URINE: NEGATIVE
LACTIC ACID, WHOLE BLOOD: 2.8 MMOL/L (ref 0.7–2.1)
LEUKOCYTE ESTERASE, URINE: NEGATIVE
LYMPHOCYTES # BLD: 4 % (ref 24–44)
LYMPHOCYTES # BLD: 8 % (ref 24–44)
MCH RBC QN AUTO: 30 PG (ref 25.2–33.5)
MCH RBC QN AUTO: 31.2 PG (ref 25.2–33.5)
MCHC RBC AUTO-ENTMCNC: 35.5 G/DL (ref 28.4–34.8)
MCHC RBC AUTO-ENTMCNC: 36.8 G/DL (ref 28.4–34.8)
MCV RBC AUTO: 84.4 FL (ref 82.6–102.9)
MCV RBC AUTO: 84.7 FL (ref 82.6–102.9)
MODE: ABNORMAL
MONOCYTES # BLD: 4 % (ref 1–7)
MONOCYTES # BLD: 7 % (ref 1–7)
MORPHOLOGY: ABNORMAL
MUCUS: NORMAL
NEGATIVE BASE EXCESS, MIXED: ABNORMAL (ref 0–2)
NITRITE, URINE: NEGATIVE
NRBC AUTOMATED: 0.4 PER 100 WBC
NRBC AUTOMATED: 0.6 PER 100 WBC
NUCLEATED RED BLOOD CELLS: 1 PER 100 WBC
O2 DEVICE/FLOW/%: ABNORMAL
O2 SAT, MIXED: 97 % (ref 60–80)
OTHER OBSERVATIONS UA: NORMAL
PARTIAL THROMBOPLASTIN TIME: 25 SEC (ref 20.5–30.5)
PATIENT TEMP: ABNORMAL
PCO2 MIXED: 47.4 MM HG (ref 42–52)
PDW BLD-RTO: 15.4 % (ref 11.8–14.4)
PDW BLD-RTO: 16 % (ref 11.8–14.4)
PH UA: 5 (ref 5–8)
PH, MIXED: 7.35 (ref 7.31–7.41)
PLATELET # BLD: 147 K/UL (ref 138–453)
PLATELET # BLD: 172 K/UL (ref 138–453)
PLATELET ESTIMATE: ABNORMAL
PLATELET ESTIMATE: ABNORMAL
PMV BLD AUTO: 12.4 FL (ref 8.1–13.5)
PMV BLD AUTO: 13 FL (ref 8.1–13.5)
PO2 MIXED: 92.3 MM HG (ref 35–45)
POC CHLORIDE: 108 MMOL/L (ref 98–107)
POC CREATININE: 1.37 MG/DL (ref 0.51–1.19)
POC HEMATOCRIT: 36 % (ref 41–53)
POC HEMOGLOBIN: 12.3 G/DL (ref 13.5–17.5)
POC IONIZED CALCIUM: 0.99 MMOL/L (ref 1.15–1.33)
POC LACTIC ACID: 2.39 MMOL/L (ref 0.56–1.39)
POC PCO2 TEMP: ABNORMAL MM HG
POC PH TEMP: ABNORMAL
POC PO2 TEMP: ABNORMAL MM HG
POC POTASSIUM: 5 MMOL/L (ref 3.5–4.5)
POC SODIUM: 139 MMOL/L (ref 138–146)
POSITIVE BASE EXCESS, MIXED: 0 (ref 0–3)
POTASSIUM SERPL-SCNC: 4.8 MMOL/L (ref 3.7–5.3)
PROCALCITONIN: 2.03 NG/ML
PROTEIN UA: ABNORMAL
PROTHROMBIN TIME: 14.4 SEC (ref 9–12)
RBC # BLD: 3.77 M/UL (ref 4.21–5.77)
RBC # BLD: 4.04 M/UL (ref 4.21–5.77)
RBC # BLD: ABNORMAL 10*6/UL
RBC # BLD: ABNORMAL 10*6/UL
RBC UA: NORMAL /HPF (ref 0–2)
RENAL EPITHELIAL, UA: NORMAL /HPF
RETIC %: 7.3 % (ref 0.5–1.9)
RETIC HEMOGLOBIN: 29 PG (ref 28.2–35.7)
SAMPLE SITE: ABNORMAL
SEG NEUTROPHILS: 85 % (ref 36–66)
SEG NEUTROPHILS: 86 % (ref 36–66)
SEGMENTED NEUTROPHILS ABSOLUTE COUNT: 18.54 K/UL (ref 1.8–7.7)
SEGMENTED NEUTROPHILS ABSOLUTE COUNT: 27.18 K/UL (ref 1.8–7.7)
SODIUM BLD-SCNC: 140 MMOL/L (ref 135–144)
SPECIFIC GRAVITY UA: 1.01 (ref 1–1.03)
TCO2 CALC MIXED: 28 MMOL/L (ref 24–30)
TOTAL PROTEIN: 7.4 G/DL (ref 6.4–8.3)
TOTAL PROTEIN: 8.6 G/DL (ref 6.4–8.3)
TRICHOMONAS: NORMAL
TURBIDITY: ABNORMAL
URINE HGB: ABNORMAL
UROBILINOGEN, URINE: NORMAL
VITAMIN B-12: >2000 PG/ML (ref 232–1245)
WBC # BLD: 21.8 K/UL (ref 3.5–11.3)
WBC # BLD: 31.6 K/UL (ref 3.5–11.3)
WBC # BLD: ABNORMAL 10*3/UL
WBC # BLD: ABNORMAL 10*3/UL
WBC UA: NORMAL /HPF (ref 0–5)
YEAST: NORMAL

## 2019-09-08 PROCEDURE — 36415 COLL VENOUS BLD VENIPUNCTURE: CPT

## 2019-09-08 PROCEDURE — 82803 BLOOD GASES ANY COMBINATION: CPT

## 2019-09-08 PROCEDURE — 2580000003 HC RX 258: Performed by: STUDENT IN AN ORGANIZED HEALTH CARE EDUCATION/TRAINING PROGRAM

## 2019-09-08 PROCEDURE — 85025 COMPLETE CBC W/AUTO DIFF WBC: CPT

## 2019-09-08 PROCEDURE — 83605 ASSAY OF LACTIC ACID: CPT

## 2019-09-08 PROCEDURE — 6360000002 HC RX W HCPCS: Performed by: STUDENT IN AN ORGANIZED HEALTH CARE EDUCATION/TRAINING PROGRAM

## 2019-09-08 PROCEDURE — 99285 EMERGENCY DEPT VISIT HI MDM: CPT

## 2019-09-08 PROCEDURE — 85014 HEMATOCRIT: CPT

## 2019-09-08 PROCEDURE — 96367 TX/PROPH/DG ADDL SEQ IV INF: CPT

## 2019-09-08 PROCEDURE — 2060000000 HC ICU INTERMEDIATE R&B

## 2019-09-08 PROCEDURE — 80076 HEPATIC FUNCTION PANEL: CPT

## 2019-09-08 PROCEDURE — 82565 ASSAY OF CREATININE: CPT

## 2019-09-08 PROCEDURE — 82607 VITAMIN B-12: CPT

## 2019-09-08 PROCEDURE — 96375 TX/PRO/DX INJ NEW DRUG ADDON: CPT

## 2019-09-08 PROCEDURE — 87086 URINE CULTURE/COLONY COUNT: CPT

## 2019-09-08 PROCEDURE — 99223 1ST HOSP IP/OBS HIGH 75: CPT | Performed by: FAMILY MEDICINE

## 2019-09-08 PROCEDURE — 6370000000 HC RX 637 (ALT 250 FOR IP): Performed by: STUDENT IN AN ORGANIZED HEALTH CARE EDUCATION/TRAINING PROGRAM

## 2019-09-08 PROCEDURE — 82746 ASSAY OF FOLIC ACID SERUM: CPT

## 2019-09-08 PROCEDURE — 80307 DRUG TEST PRSMV CHEM ANLYZR: CPT

## 2019-09-08 PROCEDURE — 71260 CT THORAX DX C+: CPT

## 2019-09-08 PROCEDURE — 82435 ASSAY OF BLOOD CHLORIDE: CPT

## 2019-09-08 PROCEDURE — 80048 BASIC METABOLIC PNL TOTAL CA: CPT

## 2019-09-08 PROCEDURE — 85045 AUTOMATED RETICULOCYTE COUNT: CPT

## 2019-09-08 PROCEDURE — 96365 THER/PROPH/DIAG IV INF INIT: CPT

## 2019-09-08 PROCEDURE — 82330 ASSAY OF CALCIUM: CPT

## 2019-09-08 PROCEDURE — 82947 ASSAY GLUCOSE BLOOD QUANT: CPT

## 2019-09-08 PROCEDURE — 85730 THROMBOPLASTIN TIME PARTIAL: CPT

## 2019-09-08 PROCEDURE — 74177 CT ABD & PELVIS W/CONTRAST: CPT

## 2019-09-08 PROCEDURE — 84145 PROCALCITONIN (PCT): CPT

## 2019-09-08 PROCEDURE — 85610 PROTHROMBIN TIME: CPT

## 2019-09-08 PROCEDURE — 84295 ASSAY OF SERUM SODIUM: CPT

## 2019-09-08 PROCEDURE — 6360000004 HC RX CONTRAST MEDICATION: Performed by: STUDENT IN AN ORGANIZED HEALTH CARE EDUCATION/TRAINING PROGRAM

## 2019-09-08 PROCEDURE — 87040 BLOOD CULTURE FOR BACTERIA: CPT

## 2019-09-08 PROCEDURE — 80074 ACUTE HEPATITIS PANEL: CPT

## 2019-09-08 PROCEDURE — 81001 URINALYSIS AUTO W/SCOPE: CPT

## 2019-09-08 PROCEDURE — 71045 X-RAY EXAM CHEST 1 VIEW: CPT

## 2019-09-08 PROCEDURE — 84132 ASSAY OF SERUM POTASSIUM: CPT

## 2019-09-08 RX ORDER — ONDANSETRON 2 MG/ML
4 INJECTION INTRAMUSCULAR; INTRAVENOUS ONCE
Status: COMPLETED | OUTPATIENT
Start: 2019-09-08 | End: 2019-09-08

## 2019-09-08 RX ORDER — MORPHINE SULFATE 4 MG/ML
4 INJECTION, SOLUTION INTRAMUSCULAR; INTRAVENOUS EVERY 4 HOURS PRN
Status: CANCELLED | OUTPATIENT
Start: 2019-09-08

## 2019-09-08 RX ORDER — SODIUM CHLORIDE 9 MG/ML
INJECTION, SOLUTION INTRAVENOUS CONTINUOUS
Status: DISCONTINUED | OUTPATIENT
Start: 2019-09-08 | End: 2019-09-09

## 2019-09-08 RX ORDER — ONDANSETRON 4 MG/1
4 TABLET, FILM COATED ORAL EVERY 6 HOURS PRN
Status: DISCONTINUED | OUTPATIENT
Start: 2019-09-08 | End: 2019-09-11 | Stop reason: HOSPADM

## 2019-09-08 RX ORDER — FENTANYL CITRATE 50 UG/ML
50 INJECTION, SOLUTION INTRAMUSCULAR; INTRAVENOUS 4 TIMES DAILY PRN
Status: DISCONTINUED | OUTPATIENT
Start: 2019-09-08 | End: 2019-09-10

## 2019-09-08 RX ORDER — ONDANSETRON 2 MG/ML
4 INJECTION INTRAMUSCULAR; INTRAVENOUS EVERY 6 HOURS PRN
Status: DISCONTINUED | OUTPATIENT
Start: 2019-09-08 | End: 2019-09-11 | Stop reason: HOSPADM

## 2019-09-08 RX ORDER — 0.9 % SODIUM CHLORIDE 0.9 %
1000 INTRAVENOUS SOLUTION INTRAVENOUS ONCE
Status: COMPLETED | OUTPATIENT
Start: 2019-09-08 | End: 2019-09-08

## 2019-09-08 RX ORDER — SODIUM CHLORIDE 0.9 % (FLUSH) 0.9 %
10 SYRINGE (ML) INJECTION EVERY 12 HOURS SCHEDULED
Status: DISCONTINUED | OUTPATIENT
Start: 2019-09-08 | End: 2019-09-11 | Stop reason: HOSPADM

## 2019-09-08 RX ORDER — DIPHENHYDRAMINE HCL 25 MG
50 TABLET ORAL EVERY 6 HOURS PRN
Status: DISCONTINUED | OUTPATIENT
Start: 2019-09-08 | End: 2019-09-11 | Stop reason: HOSPADM

## 2019-09-08 RX ORDER — HYDROXYZINE HYDROCHLORIDE 10 MG/1
10 TABLET, FILM COATED ORAL 3 TIMES DAILY PRN
Status: DISCONTINUED | OUTPATIENT
Start: 2019-09-08 | End: 2019-09-08

## 2019-09-08 RX ORDER — FOLIC ACID 1 MG/1
1 TABLET ORAL DAILY
Status: DISCONTINUED | OUTPATIENT
Start: 2019-09-08 | End: 2019-09-11 | Stop reason: HOSPADM

## 2019-09-08 RX ORDER — IODINE/SODIUM IODIDE 2 %
1 TINCTURE TOPICAL PRN
Status: DISCONTINUED | OUTPATIENT
Start: 2019-09-08 | End: 2019-09-11 | Stop reason: HOSPADM

## 2019-09-08 RX ORDER — PROMETHAZINE HYDROCHLORIDE 25 MG/ML
12.5 INJECTION, SOLUTION INTRAMUSCULAR; INTRAVENOUS ONCE
Status: COMPLETED | OUTPATIENT
Start: 2019-09-08 | End: 2019-09-08

## 2019-09-08 RX ORDER — SODIUM CHLORIDE 0.9 % (FLUSH) 0.9 %
10 SYRINGE (ML) INJECTION PRN
Status: DISCONTINUED | OUTPATIENT
Start: 2019-09-08 | End: 2019-09-11 | Stop reason: HOSPADM

## 2019-09-08 RX ORDER — MORPHINE SULFATE 4 MG/ML
4 INJECTION, SOLUTION INTRAMUSCULAR; INTRAVENOUS ONCE
Status: COMPLETED | OUTPATIENT
Start: 2019-09-08 | End: 2019-09-08

## 2019-09-08 RX ADMIN — MORPHINE SULFATE 4 MG: 4 INJECTION INTRAVENOUS at 08:07

## 2019-09-08 RX ADMIN — SODIUM CHLORIDE 1000 ML: 9 INJECTION, SOLUTION INTRAVENOUS at 08:07

## 2019-09-08 RX ADMIN — HYDROXYZINE HYDROCHLORIDE 10 MG: 10 TABLET ORAL at 13:15

## 2019-09-08 RX ADMIN — PIPERACILLIN AND TAZOBACTAM 3.38 G: 3; .375 INJECTION, POWDER, FOR SOLUTION INTRAVENOUS at 21:24

## 2019-09-08 RX ADMIN — SODIUM CHLORIDE, PRESERVATIVE FREE 10 ML: 5 INJECTION INTRAVENOUS at 20:16

## 2019-09-08 RX ADMIN — ONDANSETRON 4 MG: 2 INJECTION INTRAMUSCULAR; INTRAVENOUS at 08:07

## 2019-09-08 RX ADMIN — PIPERACILLIN AND TAZOBACTAM 3.38 G: 3; .375 INJECTION, POWDER, FOR SOLUTION INTRAVENOUS at 15:02

## 2019-09-08 RX ADMIN — PROMETHAZINE HYDROCHLORIDE 12.5 MG: 25 INJECTION INTRAMUSCULAR; INTRAVENOUS at 09:55

## 2019-09-08 RX ADMIN — FENTANYL CITRATE 50 MCG: 50 INJECTION INTRAMUSCULAR; INTRAVENOUS at 20:10

## 2019-09-08 RX ADMIN — FENTANYL CITRATE 50 MCG: 50 INJECTION INTRAMUSCULAR; INTRAVENOUS at 13:12

## 2019-09-08 RX ADMIN — PIPERACILLIN AND TAZOBACTAM 3.38 G: 3; .375 INJECTION, POWDER, LYOPHILIZED, FOR SOLUTION INTRAVENOUS; PARENTERAL at 09:55

## 2019-09-08 RX ADMIN — IOVERSOL 75 ML: 741 INJECTION INTRA-ARTERIAL; INTRAVENOUS at 08:46

## 2019-09-08 RX ADMIN — FOLIC ACID 1 MG: 1 TABLET ORAL at 13:17

## 2019-09-08 RX ADMIN — Medication 10 ML: at 21:31

## 2019-09-08 RX ADMIN — Medication 1250 MG: at 10:38

## 2019-09-08 RX ADMIN — SODIUM CHLORIDE: 9 INJECTION, SOLUTION INTRAVENOUS at 15:02

## 2019-09-08 RX ADMIN — VANCOMYCIN HYDROCHLORIDE 1250 MG: 10 INJECTION, POWDER, LYOPHILIZED, FOR SOLUTION INTRAVENOUS at 22:47

## 2019-09-08 ASSESSMENT — PAIN SCALES - GENERAL
PAINLEVEL_OUTOF10: 0
PAINLEVEL_OUTOF10: 8
PAINLEVEL_OUTOF10: 8
PAINLEVEL_OUTOF10: 2
PAINLEVEL_OUTOF10: 0
PAINLEVEL_OUTOF10: 9
PAINLEVEL_OUTOF10: 9

## 2019-09-08 ASSESSMENT — ENCOUNTER SYMPTOMS
CHEST TIGHTNESS: 0
ABDOMINAL DISTENTION: 0
NAUSEA: 1
SHORTNESS OF BREATH: 1
VOMITING: 1
COUGH: 0
DIARRHEA: 1
ABDOMINAL PAIN: 1
EYES NEGATIVE: 1
BLOOD IN STOOL: 0

## 2019-09-08 NOTE — H&P
surgical history that includes laparoscopic splenectomy (Left, 9/2007); Cholecystectomy, laparoscopic (1990); knee surgery (Left, 2008); and Penis surgery ([id number]). FAMILY HISTORY:     family history includes Sickle Cell Anemia in his brother. HOME MEDICATIONS:     Prior to Admission medications    Medication Sig Start Date End Date Taking? Authorizing Provider   nicotine polacrilex (NICORETTE) 2 MG gum Take 1 each by mouth as needed for Smoking cessation 4/2/19   Carmencita Rdz MD       ALLERGIES:      Patient has no known allergies. SOCIAL HISTORY:      reports that he has been smoking cigarettes. He has a 2.00 pack-year smoking history. He has never used smokeless tobacco. He reports that he has current or past drug history. Frequency: 1.00 time per week. He reports that he does not drink alcohol. REVIEW OF SYSTEMS:     Review of Systems   Constitutional: Positive for fatigue. Negative for chills and fever. HENT: Negative. Negative for congestion. Eyes: Negative. Respiratory: Positive for shortness of breath. Negative for cough and chest tightness. Cardiovascular: Positive for palpitations. Negative for chest pain and leg swelling. Gastrointestinal: Positive for abdominal pain (LLQ), diarrhea, nausea and vomiting. Negative for abdominal distention and blood in stool. Endocrine: Negative. Genitourinary: Negative for difficulty urinating, dysuria and flank pain. Musculoskeletal: Positive for myalgias. Neurological: Positive for headaches. Negative for dizziness, syncope, weakness and light-headedness. Hematological: Negative. Psychiatric/Behavioral: Negative for agitation. The patient is nervous/anxious.           PHYSICAL EXAM:     Vitals:    09/08/19 0707 09/08/19 0818 09/08/19 1027   BP: 117/82 (!) 130/93    Pulse: 92  91   Resp: 16  16   Temp: 97 °F (36.1 °C)     TempSrc: Oral     SpO2: 93% 95%    Weight: 180 lb (81.6 kg)     Height: 6' 1\" (1.854 m)         No intake Prior splenectomy. These findings are consistent with patient's history of sickle cell disease. GI/Bowel: No significant dilation of small bowel loops to suggest small bowel obstruction. Appendix is not definitively visualized. Pelvis: Urinary bladder is collapsed. No significant free fluid in the pelvis. No inguinal or pelvic sidewall lymphadenopathy. Peritoneum/Retroperitoneum: Abdominal aorta normal in appearance and caliber. Psoas muscles normal in size and symmetric in appearance. No retroperitoneal lymphadenopathy. Bones/Soft Tissues: No acute osseous abnormality identified. H-type vertebral bodies consistent with patient's history of sickle cell disease. 1. Prior cholecystectomy and splenectomy as well as morphology of the vertebral bodies consistent with patient's history of sickle cell disease. 2. Striated pattern of the bilateral renal parenchyma most likely related to phase of enhancement, but entities such as acute tubular necrosis or pyelonephritis not entirely excluded in the appropriate clinical setting. Please correlate with urinalysis and CBC. 3. Excretion of contrast in the bilateral renal collecting systems. 4. Nonvisualization of the appendix. 5. No evidence for small bowel obstruction. 6. Please see CT chest report from 09/08/2019 for details regarding intrathoracic findings. Xr Chest Portable    Result Date: 9/8/2019  EXAMINATION: ONE XRAY VIEW OF THE CHEST 9/8/2019 8:02 am COMPARISON: March 24, 2019 HISTORY: ORDERING SYSTEM PROVIDED HISTORY: chest pain TECHNOLOGIST PROVIDED HISTORY: chest pain Relevant Medical/Surgical History: chest pain nausea and vomiting FINDINGS: No focal consolidation. Minimal scarring at the right lung base is unchanged. No cardiomegaly. No pulmonary edema. No acute findings. No change.      Ct Chest Pulmonary Embolism W Contrast    Result Date: 9/8/2019  EXAMINATION: CTA OF THE CHEST 9/8/2019 8:37 am TECHNIQUE: CTA of the chest was performed after disease  -ALT 2927 AST 5439  -Continue to follow LFTs  -IV fluids, pain control  -Follow-up hepatitis panel  -Follow-up heme-onc recommendations    Consultations:   Consults: PHARMACY TO DOSE VANCOMYCIN  IP CONSULT TO INTERNAL MEDICINE  IP CONSULT TO FAMILY MEDICINE  IP CONSULT TO FAMILY MEDICINE  IP CONSULT TO SOCIAL WORK  IP CONSULT TO HEM/ONC  PT/OT         The severity of this patient's signs and symptoms (specify Abdominal pain, shortness of breath, pain) indicate the need for an inpatient admission.       Above plan discussed with the patient and family in room, who agreed to the above plan     Plan will be discussed with the attending, Dr. Gabriella Ladd MD  Family Medicine Resident  9/8/2019 12:03 PM

## 2019-09-08 NOTE — ED PROVIDER NOTES
name: Not on file    Number of children: Not on file    Years of education: Not on file    Highest education level: Not on file   Occupational History    Not on file   Social Needs    Financial resource strain: Not on file    Food insecurity:     Worry: Not on file     Inability: Not on file    Transportation needs:     Medical: Not on file     Non-medical: Not on file   Tobacco Use    Smoking status: Current Some Day Smoker     Packs/day: 1.00     Years: 2.00     Pack years: 2.00     Types: Cigarettes    Smokeless tobacco: Never Used    Tobacco comment: marajuana q week   Substance and Sexual Activity    Alcohol use: No    Drug use: Not Currently     Frequency: 1.0 times per week     Comment: Clarissa Tracy, 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    Sexual activity: Yes     Partners: Female     Birth control/protection: Condom   Lifestyle    Physical activity:     Days per week: Not on file     Minutes per session: Not on file    Stress: Not on file   Relationships    Social connections:     Talks on phone: Not on file     Gets together: Not on file     Attends Druze service: Not on file     Active member of club or organization: Not on file     Attends meetings of clubs or organizations: Not on file     Relationship status: Not on file    Intimate partner violence:     Fear of current or ex partner: Not on file     Emotionally abused: Not on file     Physically abused: Not on file     Forced sexual activity: Not on file   Other Topics Concern    Not on file   Social History Narrative    Not on file       Family History   Problem Relation Age of Onset    Sickle Cell Anemia Brother         Allergies:  Patient has no known allergies. Home Medications:  Prior to Admission medications    Not on File       REVIEW OFSYSTEMS    (2-9 systems for level 4, 10 or more for level 5)      Constitutional ROS -subjective fevers and chills, no temperature taken.   Feels dehydrated  Neurological ROS - No Headache, no focal unchanged. No cardiomegaly. No pulmonary edema. No acute findings. No change. Ct Chest Pulmonary Embolism W Contrast    Result Date: 9/8/2019  EXAMINATION: CTA OF THE CHEST 9/8/2019 8:37 am TECHNIQUE: CTA of the chest was performed after the administration of intravenous contrast.  Multiplanar reformatted images are provided for review. MIP images are provided for review. Dose modulation, iterative reconstruction, and/or weight based adjustment of the mA/kV was utilized to reduce the radiation dose to as low as reasonably achievable. COMPARISON: CT chest from 03/24/2019 HISTORY: ORDERING SYSTEM PROVIDED HISTORY: PE study Patient is a 20-year-old male with possible pulmonary embolus. FINDINGS: Pulmonary Arteries: Evaluation of the pulmonary arterial vasculature is markedly limited due to suboptimal bolus timing and respiratory motion. No obvious filling defect identified in the main, right main, left main pulmonary arterial vasculature. Evaluation of the lobar pulmonary arterial vasculature and beyond is severely limited for the assessment of pulmonary embolus. Mediastinum: Visualized thyroid gland grossly unremarkable in appearance. No axillary, mediastinal, or hilar lymphadenopathy. No periaortic or mediastinal hemorrhage. No pericardial or pleural effusions. Lungs/pleura: Trachea and proximal central airways appear patent. Respiratory motion atelectasis and parenchymal banding in the lower lung zones. A 5 mm pulmonary nodule in the right upper lobe on image 46, series 4, unchanged from 03/24/2019. Stable 5 mm pulmonary nodule in the medial right lower lobe on image 86, series 4, unchanged from 03/24/2019. Multiple additional nodular opacities in both lungs in association with vascular structures are stable from 03/24/2019. No pneumothorax. Upper Abdomen: Partial visualization of the upper abdomen grossly unremarkable. Soft Tissues/Bones: Mild degenerative changes in the spine.   Multilevel

## 2019-09-09 ENCOUNTER — APPOINTMENT (OUTPATIENT)
Dept: ULTRASOUND IMAGING | Age: 24
DRG: 662 | End: 2019-09-09
Payer: MEDICAID

## 2019-09-09 LAB
ABSOLUTE EOS #: <0.03 K/UL (ref 0–0.44)
ABSOLUTE IMMATURE GRANULOCYTE: 0.1 K/UL (ref 0–0.3)
ABSOLUTE LYMPH #: 2.91 K/UL (ref 1.1–3.7)
ABSOLUTE MONO #: 1.18 K/UL (ref 0.1–1.2)
ABSOLUTE RETIC #: 0.21 M/UL (ref 0.03–0.08)
ABSOLUTE RETIC #: 0.25 M/UL (ref 0.03–0.08)
ACETAMINOPHEN LEVEL: <5 UG/ML (ref 10–30)
ALBUMIN SERPL-MCNC: 3.9 G/DL (ref 3.5–5.2)
ALBUMIN/GLOBULIN RATIO: 1.4 (ref 1–2.5)
ALP BLD-CCNC: 78 U/L (ref 40–129)
ALPHA-1 ANTITRYPSIN: 168 MG/DL (ref 90–200)
ALT SERPL-CCNC: 1841 U/L (ref 5–41)
AMMONIA: 22 UMOL/L (ref 16–60)
AMPHETAMINE SCREEN URINE: NEGATIVE
ANION GAP SERPL CALCULATED.3IONS-SCNC: 12 MMOL/L (ref 9–17)
AST SERPL-CCNC: 2666 U/L
BARBITURATE SCREEN URINE: NEGATIVE
BASOPHILS # BLD: 0 % (ref 0–2)
BASOPHILS ABSOLUTE: 0.05 K/UL (ref 0–0.2)
BENZODIAZEPINE SCREEN, URINE: NEGATIVE
BILIRUB SERPL-MCNC: 5.51 MG/DL (ref 0.3–1.2)
BILIRUBIN DIRECT: 0.36 MG/DL
BILIRUBIN, INDIRECT: 5.15 MG/DL (ref 0–1)
BUN BLDV-MCNC: 14 MG/DL (ref 6–20)
BUN/CREAT BLD: ABNORMAL (ref 9–20)
BUPRENORPHINE URINE: ABNORMAL
CALCIUM SERPL-MCNC: 8.4 MG/DL (ref 8.6–10.4)
CANNABINOID SCREEN URINE: POSITIVE
CERULOPLASMIN: 28 MG/DL (ref 15–30)
CHLORIDE BLD-SCNC: 99 MMOL/L (ref 98–107)
CMV IGM: 0.2
CO2: 27 MMOL/L (ref 20–31)
COCAINE METABOLITE, URINE: POSITIVE
CREAT SERPL-MCNC: 0.85 MG/DL (ref 0.7–1.2)
CULTURE: NO GROWTH
DIFFERENTIAL TYPE: ABNORMAL
EOSINOPHILS RELATIVE PERCENT: 0 % (ref 1–4)
ETHANOL PERCENT: <0.01 %
ETHANOL: <10 MG/DL
FERRITIN: ABNORMAL UG/L (ref 30–400)
GFR AFRICAN AMERICAN: >60 ML/MIN
GFR NON-AFRICAN AMERICAN: >60 ML/MIN
GFR SERPL CREATININE-BSD FRML MDRD: ABNORMAL ML/MIN/{1.73_M2}
GFR SERPL CREATININE-BSD FRML MDRD: ABNORMAL ML/MIN/{1.73_M2}
GLIADIN DEAMINIDATED PEPTIDE AB IGA: 0.9 U/ML
GLIADIN DEAMINIDATED PEPTIDE AB IGG: <0.4 U/ML
GLOBULIN: ABNORMAL G/DL (ref 1.5–3.8)
GLUCOSE BLD-MCNC: 101 MG/DL (ref 70–99)
HCT VFR BLD CALC: 29.4 % (ref 40.7–50.3)
HEMOGLOBIN: 10.1 G/DL (ref 13–17)
HEMOGLOBIN: 10.1 G/DL (ref 13–17)
HEMOGLOBIN: 10.4 G/DL (ref 13–17)
HEMOGLOBIN: 10.7 G/DL (ref 13–17)
IGA: 249 MG/DL (ref 70–400)
IGG: 1246 MG/DL (ref 700–1600)
IGM: 30 MG/DL (ref 40–230)
IMMATURE GRANULOCYTES: 1 %
IMMATURE RETIC FRACT: 26.4 % (ref 2.7–18.3)
IMMATURE RETIC FRACT: 38.9 % (ref 2.7–18.3)
INR BLD: 1.2
IRON SATURATION: 88 % (ref 20–55)
IRON: 261 UG/DL (ref 59–158)
LACTATE DEHYDROGENASE: 1272 U/L (ref 135–225)
LYMPHOCYTES # BLD: 21 % (ref 24–43)
Lab: NORMAL
MCH RBC QN AUTO: 31.1 PG (ref 25.2–33.5)
MCHC RBC AUTO-ENTMCNC: 36.4 G/DL (ref 28.4–34.8)
MCV RBC AUTO: 85.5 FL (ref 82.6–102.9)
MDMA URINE: ABNORMAL
METHADONE SCREEN, URINE: NEGATIVE
METHAMPHETAMINE, URINE: ABNORMAL
MONOCYTES # BLD: 8 % (ref 3–12)
MYOGLOBIN: 137 NG/ML (ref 28–72)
NRBC AUTOMATED: 1.5 PER 100 WBC
OPIATES, URINE: NEGATIVE
OXYCODONE SCREEN URINE: NEGATIVE
PDW BLD-RTO: 15.8 % (ref 11.8–14.4)
PHENCYCLIDINE, URINE: NEGATIVE
PLATELET # BLD: 158 K/UL (ref 138–453)
PLATELET ESTIMATE: ABNORMAL
PMV BLD AUTO: 12.9 FL (ref 8.1–13.5)
POTASSIUM SERPL-SCNC: 3.7 MMOL/L (ref 3.7–5.3)
PROPOXYPHENE, URINE: ABNORMAL
PROTHROMBIN TIME: 12.6 SEC (ref 9–12)
RBC # BLD: 3.44 M/UL (ref 4.21–5.77)
RBC # BLD: ABNORMAL 10*6/UL
RETIC %: 6.1 % (ref 0.5–1.9)
RETIC %: 7.4 % (ref 0.5–1.9)
RETIC HEMOGLOBIN: 28.8 PG (ref 28.2–35.7)
RETIC HEMOGLOBIN: 32.8 PG (ref 28.2–35.7)
SALICYLATE LEVEL: <1 MG/DL (ref 3–10)
SEG NEUTROPHILS: 70 % (ref 36–65)
SEGMENTED NEUTROPHILS ABSOLUTE COUNT: 9.98 K/UL (ref 1.5–8.1)
SODIUM BLD-SCNC: 138 MMOL/L (ref 135–144)
SPECIMEN DESCRIPTION: NORMAL
SURGICAL PATHOLOGY REPORT: NORMAL
TEST INFORMATION: ABNORMAL
TISSUE TRANSGLUTAMINASE IGA: 0.5 U/ML
TOTAL CK: 4162 U/L (ref 39–308)
TOTAL IRON BINDING CAPACITY: 297 UG/DL (ref 250–450)
TOTAL PROTEIN: 6.6 G/DL (ref 6.4–8.3)
TOXIC TRICYCLIC SC,BLOOD: NEGATIVE
TRICYCLIC ANTIDEPRESSANTS, UR: ABNORMAL
UNSATURATED IRON BINDING CAPACITY: 36 UG/DL (ref 112–347)
WBC # BLD: 14.2 K/UL (ref 3.5–11.3)
WBC # BLD: ABNORMAL 10*3/UL

## 2019-09-09 PROCEDURE — 2580000003 HC RX 258: Performed by: STUDENT IN AN ORGANIZED HEALTH CARE EDUCATION/TRAINING PROGRAM

## 2019-09-09 PROCEDURE — 80076 HEPATIC FUNCTION PANEL: CPT

## 2019-09-09 PROCEDURE — 83516 IMMUNOASSAY NONANTIBODY: CPT

## 2019-09-09 PROCEDURE — 83540 ASSAY OF IRON: CPT

## 2019-09-09 PROCEDURE — 85610 PROTHROMBIN TIME: CPT

## 2019-09-09 PROCEDURE — G0480 DRUG TEST DEF 1-7 CLASSES: HCPCS

## 2019-09-09 PROCEDURE — 86376 MICROSOMAL ANTIBODY EACH: CPT

## 2019-09-09 PROCEDURE — 86038 ANTINUCLEAR ANTIBODIES: CPT

## 2019-09-09 PROCEDURE — 82140 ASSAY OF AMMONIA: CPT

## 2019-09-09 PROCEDURE — 2580000003 HC RX 258: Performed by: INTERNAL MEDICINE

## 2019-09-09 PROCEDURE — 82784 ASSAY IGA/IGD/IGG/IGM EACH: CPT

## 2019-09-09 PROCEDURE — 83020 HEMOGLOBIN ELECTROPHORESIS: CPT

## 2019-09-09 PROCEDURE — 6360000002 HC RX W HCPCS: Performed by: STUDENT IN AN ORGANIZED HEALTH CARE EDUCATION/TRAINING PROGRAM

## 2019-09-09 PROCEDURE — 86255 FLUORESCENT ANTIBODY SCREEN: CPT

## 2019-09-09 PROCEDURE — 99233 SBSQ HOSP IP/OBS HIGH 50: CPT | Performed by: FAMILY MEDICINE

## 2019-09-09 PROCEDURE — 99255 IP/OBS CONSLTJ NEW/EST HI 80: CPT | Performed by: INTERNAL MEDICINE

## 2019-09-09 PROCEDURE — 86664 EPSTEIN-BARR NUCLEAR ANTIGEN: CPT

## 2019-09-09 PROCEDURE — 80307 DRUG TEST PRSMV CHEM ANLYZR: CPT

## 2019-09-09 PROCEDURE — 85045 AUTOMATED RETICULOCYTE COUNT: CPT

## 2019-09-09 PROCEDURE — 36415 COLL VENOUS BLD VENIPUNCTURE: CPT

## 2019-09-09 PROCEDURE — 6370000000 HC RX 637 (ALT 250 FOR IP): Performed by: STUDENT IN AN ORGANIZED HEALTH CARE EDUCATION/TRAINING PROGRAM

## 2019-09-09 PROCEDURE — 82103 ALPHA-1-ANTITRYPSIN TOTAL: CPT

## 2019-09-09 PROCEDURE — 86645 CMV ANTIBODY IGM: CPT

## 2019-09-09 PROCEDURE — 86665 EPSTEIN-BARR CAPSID VCA: CPT

## 2019-09-09 PROCEDURE — 97162 PT EVAL MOD COMPLEX 30 MIN: CPT

## 2019-09-09 PROCEDURE — 99254 IP/OBS CNSLTJ NEW/EST MOD 60: CPT | Performed by: INTERNAL MEDICINE

## 2019-09-09 PROCEDURE — 82390 ASSAY OF CERULOPLASMIN: CPT

## 2019-09-09 PROCEDURE — 86663 EPSTEIN-BARR ANTIBODY: CPT

## 2019-09-09 PROCEDURE — 82728 ASSAY OF FERRITIN: CPT

## 2019-09-09 PROCEDURE — 83874 ASSAY OF MYOGLOBIN: CPT

## 2019-09-09 PROCEDURE — 82550 ASSAY OF CK (CPK): CPT

## 2019-09-09 PROCEDURE — 85018 HEMOGLOBIN: CPT

## 2019-09-09 PROCEDURE — 97530 THERAPEUTIC ACTIVITIES: CPT

## 2019-09-09 PROCEDURE — 2060000000 HC ICU INTERMEDIATE R&B

## 2019-09-09 PROCEDURE — 85025 COMPLETE CBC W/AUTO DIFF WBC: CPT

## 2019-09-09 PROCEDURE — 83615 LACTATE (LD) (LDH) ENZYME: CPT

## 2019-09-09 PROCEDURE — 86694 HERPES SIMPLEX NES ANTBDY: CPT

## 2019-09-09 PROCEDURE — 86256 FLUORESCENT ANTIBODY TITER: CPT

## 2019-09-09 PROCEDURE — 76705 ECHO EXAM OF ABDOMEN: CPT

## 2019-09-09 PROCEDURE — 83550 IRON BINDING TEST: CPT

## 2019-09-09 PROCEDURE — 80048 BASIC METABOLIC PNL TOTAL CA: CPT

## 2019-09-09 RX ORDER — SODIUM CHLORIDE 9 MG/ML
INJECTION, SOLUTION INTRAVENOUS CONTINUOUS
Status: DISCONTINUED | OUTPATIENT
Start: 2019-09-09 | End: 2019-09-11 | Stop reason: HOSPADM

## 2019-09-09 RX ORDER — FAMOTIDINE 20 MG/1
20 TABLET, FILM COATED ORAL 2 TIMES DAILY
Status: DISCONTINUED | OUTPATIENT
Start: 2019-09-09 | End: 2019-09-11 | Stop reason: HOSPADM

## 2019-09-09 RX ADMIN — Medication 10 ML: at 02:17

## 2019-09-09 RX ADMIN — PIPERACILLIN AND TAZOBACTAM 3.38 G: 3; .375 INJECTION, POWDER, FOR SOLUTION INTRAVENOUS at 12:00

## 2019-09-09 RX ADMIN — DIPHENHYDRAMINE HCL 50 MG: 25 TABLET ORAL at 00:02

## 2019-09-09 RX ADMIN — ONDANSETRON 4 MG: 2 INJECTION INTRAMUSCULAR; INTRAVENOUS at 09:26

## 2019-09-09 RX ADMIN — VANCOMYCIN HYDROCHLORIDE 1250 MG: 10 INJECTION, POWDER, LYOPHILIZED, FOR SOLUTION INTRAVENOUS at 10:05

## 2019-09-09 RX ADMIN — FENTANYL CITRATE 50 MCG: 50 INJECTION INTRAMUSCULAR; INTRAVENOUS at 21:03

## 2019-09-09 RX ADMIN — FAMOTIDINE 20 MG: 20 TABLET, FILM COATED ORAL at 10:05

## 2019-09-09 RX ADMIN — FENTANYL CITRATE 50 MCG: 50 INJECTION INTRAMUSCULAR; INTRAVENOUS at 09:22

## 2019-09-09 RX ADMIN — SODIUM CHLORIDE: 9 INJECTION, SOLUTION INTRAVENOUS at 15:00

## 2019-09-09 RX ADMIN — FENTANYL CITRATE 50 MCG: 50 INJECTION INTRAMUSCULAR; INTRAVENOUS at 02:18

## 2019-09-09 RX ADMIN — SODIUM CHLORIDE: 9 INJECTION, SOLUTION INTRAVENOUS at 23:18

## 2019-09-09 RX ADMIN — PIPERACILLIN AND TAZOBACTAM 3.38 G: 3; .375 INJECTION, POWDER, FOR SOLUTION INTRAVENOUS at 21:02

## 2019-09-09 RX ADMIN — SODIUM CHLORIDE: 9 INJECTION, SOLUTION INTRAVENOUS at 02:17

## 2019-09-09 RX ADMIN — FAMOTIDINE 20 MG: 20 TABLET, FILM COATED ORAL at 21:02

## 2019-09-09 RX ADMIN — SODIUM CHLORIDE, PRESERVATIVE FREE 10 ML: 5 INJECTION INTRAVENOUS at 08:56

## 2019-09-09 RX ADMIN — FOLIC ACID 1 MG: 1 TABLET ORAL at 10:05

## 2019-09-09 RX ADMIN — SODIUM CHLORIDE: 9 INJECTION, SOLUTION INTRAVENOUS at 08:54

## 2019-09-09 RX ADMIN — PIPERACILLIN AND TAZOBACTAM 3.38 G: 3; .375 INJECTION, POWDER, FOR SOLUTION INTRAVENOUS at 05:09

## 2019-09-09 RX ADMIN — FENTANYL CITRATE 50 MCG: 50 INJECTION INTRAMUSCULAR; INTRAVENOUS at 15:29

## 2019-09-09 RX ADMIN — SODIUM CHLORIDE, PRESERVATIVE FREE 10 ML: 5 INJECTION INTRAVENOUS at 21:23

## 2019-09-09 ASSESSMENT — PAIN DESCRIPTION - FREQUENCY
FREQUENCY: CONTINUOUS
FREQUENCY: INTERMITTENT

## 2019-09-09 ASSESSMENT — PAIN SCALES - GENERAL
PAINLEVEL_OUTOF10: 6
PAINLEVEL_OUTOF10: 0
PAINLEVEL_OUTOF10: 0
PAINLEVEL_OUTOF10: 8
PAINLEVEL_OUTOF10: 9
PAINLEVEL_OUTOF10: 8
PAINLEVEL_OUTOF10: 9
PAINLEVEL_OUTOF10: 10
PAINLEVEL_OUTOF10: 0

## 2019-09-09 ASSESSMENT — PAIN DESCRIPTION - DESCRIPTORS
DESCRIPTORS: ACHING
DESCRIPTORS: ACHING
DESCRIPTORS: CRUSHING
DESCRIPTORS: ACHING

## 2019-09-09 ASSESSMENT — PAIN DESCRIPTION - LOCATION
LOCATION: LEG;CHEST
LOCATION: LEG
LOCATION: CHEST;LEG
LOCATION: LEG

## 2019-09-09 ASSESSMENT — PAIN DESCRIPTION - PAIN TYPE
TYPE: CHRONIC PAIN
TYPE: ACUTE PAIN
TYPE: ACUTE PAIN

## 2019-09-09 ASSESSMENT — PAIN DESCRIPTION - PROGRESSION
CLINICAL_PROGRESSION: NOT CHANGED
CLINICAL_PROGRESSION: GRADUALLY WORSENING

## 2019-09-09 ASSESSMENT — PAIN DESCRIPTION - ORIENTATION
ORIENTATION: RIGHT;LEFT

## 2019-09-09 ASSESSMENT — PAIN DESCRIPTION - ONSET
ONSET: GRADUAL
ONSET: ON-GOING

## 2019-09-09 NOTE — CONSULTS
at 09/08/19 2016    sodium chloride flush 0.9 % injection 10 mL  10 mL Intravenous PRN Thiago Du MD   10 mL at 09/09/19 0217    magnesium hydroxide (MILK OF MAGNESIA) 400 MG/5ML suspension 30 mL  30 mL Oral Daily PRN Thiago Du MD        [facility]ron [facility]) injection 4 mg  4 mg Intravenous Q6H PRN Thiago Du MD        enoxaparin (LOVENOX) injection 40 mg  40 mg Subcutaneous Daily Thiago Du MD        0.9 % sodium chloride infusion   Intravenous Continuous Thiago Du  mL/hr at 62/12/31 4713      folic acid (FOLVITE) tablet 1 mg  1 mg Oral Daily Thiago Du MD   1 mg at 09/08/19 1317    ondansetron (ZOFRAN) tablet 4 mg  4 mg Oral Q6H PRN Thiago Du MD        nicotine polacrilex (NICORETTE) gum 2 mg  2 mg Oral PRN Thiago Du MD        fentaNYL (SUBLIMAZE) injection 50 mcg  50 mcg Intravenous 4x Daily PRN Thiago Du MD   50 mcg at 09/09/19 2838    vancomycin (VANCOCIN) intermittent dosing (placeholder)   Other RX Placeholder Marlee Ruiz DO        vancomycin (VANCOCIN) 1250 mg in dextrose 5 % 250 mL IVPB  1,250 mg Intravenous Q12H Marlee Ruiz DO   Stopped at 09/09/19 0109    piperacillin-tazobactam (ZOSYN) 3.375 g in dextrose 5 % 50 mL IVPB (mini-bag)  3.375 g Intravenous Q8H Thiago Du MD 12.5 mL/hr at 09/09/19 0509 3.375 g at 09/09/19 0509    diphenhydrAMINE (BENADRYL) tablet 50 mg  50 mg Oral Q6H PRN Thiago Du MD   50 mg at 09/09/19 0002    Calamine 8-8 % lotion 1 applicator  1 applicator Topical PRN Thiago Du MD           Allergies:  Patient has no known allergies. Social History:   reports that he has been smoking cigarettes. He has a 2.00 pack-year smoking history. He has never used smokeless tobacco. He reports that he has current or past drug history. Frequency: 1.00 time per week. He reports that he does not drink alcohol. Family History: family history includes Sickle Cell Anemia in his brother.     REVIEW swelling   Extremities - peripheral pulses normal, no pedal edema, no clubbing or cyanosis   Skin - normal coloration and turgor, no rashes, no suspicious skin lesions noted ,      DATA:      Labs:     CBC:   Recent Labs     09/08/19  1621 09/09/19  0545   WBC 21.8* 14.2*   HGB 11.3* 10.7*   HCT 31.8* 29.4*    158     BMP:   Recent Labs     09/08/19  0753 09/08/19  0940 09/09/19  0545     --  138   K 4.8  --  3.7   CO2 25  --  27   BUN 21*  --  14   CREATININE 1.12 1.37* 0.85   LABGLOM >60 >60 >60   GLUCOSE 116*  --  101*     PT/INR:   Recent Labs     09/08/19  0753   PROTIME 14.4*   INR 1.4     APTT:  Recent Labs     09/08/19  0753   APTT 25.0     LIVER PROFILE:  Recent Labs     09/08/19  0753 09/08/19  1621   AST 5,439* >7,000*   ALT 2,927* 2,645*   LABALBU 5.1 4.5       Results for orders placed or performed during the hospital encounter of 09/08/19   Culture Blood #1   Result Value Ref Range    Specimen Description . BLOOD     Special Requests L HAND 10ML     Culture NO GROWTH 1 DAY    Culture Blood #1   Result Value Ref Range    Specimen Description . BLOOD     Special Requests R FA 9ML     Culture NO GROWTH 1 DAY    Urine Culture   Result Value Ref Range    Specimen Description . URINE     Special Requests NOT REPORTED     Culture NO GROWTH    Basic Metabolic Panel   Result Value Ref Range    Glucose 116 (H) 70 - 99 mg/dL    BUN 21 (H) 6 - 20 mg/dL    CREATININE 1.12 0.70 - 1.20 mg/dL    Bun/Cre Ratio NOT REPORTED 9 - 20    Calcium 9.3 8.6 - 10.4 mg/dL    Sodium 140 135 - 144 mmol/L    Potassium 4.8 3.7 - 5.3 mmol/L    Chloride 97 (L) 98 - 107 mmol/L    CO2 25 20 - 31 mmol/L    Anion Gap 18 (H) 9 - 17 mmol/L    GFR Non-African American >60 >60 mL/min    GFR African American >60 >60 mL/min    GFR Comment          GFR Staging NOT REPORTED    HEPATIC FUNCTION PANEL   Result Value Ref Range    Alb 5.1 3.5 - 5.2 g/dL    Alkaline Phosphatase 107 40 - 129 U/L    ALT 2,927 (H) 5 - 41 U/L    AST 5,439 (H) <40 U/L    Total Bilirubin 8.62 (H) 0.3 - 1.2 mg/dL    Bilirubin, Direct 0.88 (H) <0.31 mg/dL    Bilirubin, Indirect 7.74 (H) 0.00 - 1.00 mg/dL    Total Protein 8.6 (H) 6.4 - 8.3 g/dL    Globulin NOT REPORTED 1.5 - 3.8 g/dL    Albumin/Globulin Ratio 1.5 1.0 - 2.5   Urinalysis Reflex to Culture   Result Value Ref Range    Color, UA DARK YELLOW (A) YELLOW    Turbidity UA TURBID (A) CLEAR    Glucose, Ur NEGATIVE NEGATIVE    Bilirubin Urine NEGATIVE NEGATIVE    Ketones, Urine NEGATIVE NEGATIVE    Specific Gravity, UA 1.011 1.005 - 1.030    Urine Hgb LARGE (A) NEGATIVE    pH, UA 5.0 5.0 - 8.0    Protein, UA 2+ (A) NEGATIVE    Urobilinogen, Urine Normal Normal    Nitrite, Urine NEGATIVE NEGATIVE    Leukocyte Esterase, Urine NEGATIVE NEGATIVE    Urinalysis Comments NOT REPORTED    Lactic Acid, Whole Blood   Result Value Ref Range    Lactic Acid, Whole Blood 2.8 (H) 0.7 - 2.1 mmol/L   CBC Auto Differential   Result Value Ref Range    WBC 31.6 (HH) 3.5 - 11.3 k/uL    RBC 4.04 (L) 4.21 - 5.77 m/uL    Hemoglobin 12.6 (L) 13.0 - 17.0 g/dL    Hematocrit 34.2 (L) 40.7 - 50.3 %    MCV 84.7 82.6 - 102.9 fL    MCH 31.2 25.2 - 33.5 pg    MCHC 36.8 (H) 28.4 - 34.8 g/dL    RDW 15.4 (H) 11.8 - 14.4 %    Platelets 726 940 - 916 k/uL    MPV 13.0 8.1 - 13.5 fL    NRBC Automated 0.4 (H) 0.0 per 100 WBC    Differential Type NOT REPORTED     WBC Morphology NOT REPORTED     RBC Morphology NOT REPORTED     Platelet Estimate NOT REPORTED     Immature Granulocytes 3 (H) 0 %    Seg Neutrophils 86 (H) 36 - 66 %    Lymphocytes 4 (L) 24 - 44 %    Monocytes 7 1 - 7 %    Eosinophils % 0 (L) 1 - 4 %    Basophils 0 0 - 2 %    nRBC 1 (H) 0 per 100 WBC    Absolute Immature Granulocyte 0.95 (H) 0.00 - 0.30 k/uL    Segs Absolute 27.18 (H) 1.8 - 7.7 k/uL    Absolute Lymph # 1.26 1.0 - 4.8 k/uL    Absolute Mono # 2.21 (H) 0.1 - 0.8 k/uL    Absolute Eos # 0.00 0.0 - 0.4 k/uL    Basophils Absolute 0.00 0.0 - 0.2 k/uL    Morphology ANISOCYTOSIS PRESENT     Morphology

## 2019-09-09 NOTE — PROGRESS NOTES
Absolute Eos # 0.00 0.0 - 0.4 k/uL    Basophils Absolute 0.00 0.0 - 0.2 k/uL    Morphology ANISOCYTOSIS PRESENT     Morphology 1+ POLYCHROMASIA     Morphology SICKLE CELLS PRESENT     Morphology 1+ SCHISTOCYTES     Morphology 1+ TARGET CELLS    CBC auto differential    Collection Time: 09/09/19  5:45 AM   Result Value Ref Range    WBC 14.2 (H) 3.5 - 11.3 k/uL    RBC 3.44 (L) 4.21 - 5.77 m/uL    Hemoglobin 10.7 (L) 13.0 - 17.0 g/dL    Hematocrit 29.4 (L) 40.7 - 50.3 %    MCV 85.5 82.6 - 102.9 fL    MCH 31.1 25.2 - 33.5 pg    MCHC 36.4 (H) 28.4 - 34.8 g/dL    RDW 15.8 (H) 11.8 - 14.4 %    Platelets 528 386 - 907 k/uL    MPV 12.9 8.1 - 13.5 fL    NRBC Automated 1.5 (H) 0.0 per 100 WBC    Differential Type NOT REPORTED     WBC Morphology NOT REPORTED     RBC Morphology ANISOCYTOSIS PRESENT     Platelet Estimate NOT REPORTED     Seg Neutrophils 70 (H) 36 - 65 %    Lymphocytes 21 (L) 24 - 43 %    Monocytes 8 3 - 12 %    Eosinophils % 0 (L) 1 - 4 %    Basophils 0 0 - 2 %    Immature Granulocytes 1 (H) 0 %    Segs Absolute 9.98 (H) 1.50 - 8.10 k/uL    Absolute Lymph # 2.91 1.10 - 3.70 k/uL    Absolute Mono # 1.18 0.10 - 1.20 k/uL    Absolute Eos # <0.03 0.00 - 0.44 k/uL    Basophils Absolute 0.05 0.00 - 0.20 k/uL    Absolute Immature Granulocyte 0.10 0.00 - 0.30 k/uL   Basic Metabolic Panel w/ Reflex to MG    Collection Time: 09/09/19  5:45 AM   Result Value Ref Range    Glucose 101 (H) 70 - 99 mg/dL    BUN 14 6 - 20 mg/dL    CREATININE 0.85 0.70 - 1.20 mg/dL    Bun/Cre Ratio NOT REPORTED 9 - 20    Calcium 8.4 (L) 8.6 - 10.4 mg/dL    Sodium 138 135 - 144 mmol/L    Potassium 3.7 3.7 - 5.3 mmol/L    Chloride 99 98 - 107 mmol/L    CO2 27 20 - 31 mmol/L    Anion Gap 12 9 - 17 mmol/L    GFR Non-African American >60 >60 mL/min    GFR African American >60 >60 mL/min    GFR Comment          GFR Staging NOT REPORTED    Acetaminophen Level    Collection Time: 09/09/19  5:45 AM   Result Value Ref Range    Acetaminophen Level <5 (L) 10

## 2019-09-09 NOTE — PROGRESS NOTES
some    Objective  Observation/Palpation  Posture: Good    Joint Mobility  Spine: WFL  ROM RLE: WFL  ROM LLE: WFL  ROM RUE: WFL  ROM LUE: WFL  Strength RLE  Strength RLE: Exception  R Hip Flexion: 4-/5  R Knee Extension: 4-/5  Strength LLE  Strength LLE: Exception  L Hip Flexion: 4-/5  L Knee Extension: 4-/5  Strength RUE  Strength RUE: WFL  Strength LUE  Strength LUE: WFL  Tone RLE  RLE Tone: Normotonic  Tone LLE  LLE Tone: Normotonic  Motor Control  Gross Motor?: WFL  Sensation  Overall Sensation Status: Impaired(Pt reports BLE numbness/tingling in toes that was present prior to admission.)  Bed mobility  Supine to Sit: Stand by assistance  Sit to Supine: Stand by assistance  Scooting: Stand by assistance  Comment: Without use of bed rails. Transfers  Sit to Stand: Contact guard assistance  Stand to sit: Contact guard assistance  Comment: STS x1 w/o AD CGA. Ambulation  Ambulation?: Yes  Ambulation 1  Surface: level tile  Device: No Device  Other Apparatus: (Pt pushed IV Pole)  Assistance: Contact guard assistance  Quality of Gait: Unsteady; slow ruthie; furniture walking  Gait Deviations: Decreased step length;Decreased step height;Slow Ruthie;Deviated path  Distance: 20 ft. Comments: Pt demonstrated increased unsteadiness with prolonged activity starting out as CGA and requiring Min Ax1 to assist pt with ambulation safely back to EOB. Pt demonstrated increased thoracic kyphosis with increased activity secondary to decreased endurance. Balance  Posture: Good  Sitting - Static: Good  Standing - Static: Good;-  Standing - Dynamic: Fair  Comments: Static sitting EOB 5 min x1 SBA good balance. Static standing w/o AD 2 min x1 CGA with Good- balance. Dynamic standing balance 4 min x1 CGA w/o AD with Fair balance.          Plan   Plan  Times per week: 5-6x/wk  Times per day: Daily  Current Treatment Recommendations: Strengthening, Gait Training, Stair training, Balance Training, Functional Mobility Training,

## 2019-09-09 NOTE — CONSULTS
Component Value Date    TTGIGA PENDING 09/09/2019     09/09/2019       IgG  Lab Results   Component Value Date    IGG 1246 09/09/2019       IgM  Lab Results   Component Value Date    IGM 30 09/09/2019       GGT   No results found for: LABGGT        AMYLASE/LIPASE/AMMONIA  No results for input(s): AMYLASE, LIPASE, AMMONIA in the last 72 hours. PT/INR  Recent Labs     09/08/19  0753   PROTIME 14.4*   INR 1.4       ANEMIA STUDIES  Recent Labs     09/08/19  1621   [id number] >2000*   FOLATE >20.0       IMAGING  Ct Abdomen Pelvis W Iv Contrast Additional Contrast? None    Result Date: 9/8/2019  EXAMINATION: CT OF THE ABDOMEN AND PELVIS WITH CONTRAST 9/8/2019 8:54 am TECHNIQUE: CT of the abdomen and pelvis was performed with the administration of intravenous contrast. Multiplanar reformatted images are provided for review. Dose modulation, iterative reconstruction, and/or weight based adjustment of the mA/kV was utilized to reduce the radiation dose to as low as reasonably achievable. COMPARISON: CT chest from 09/08/2019 HISTORY: ORDERING SYSTEM PROVIDED HISTORY: AB PAIN TECHNOLOGIST PROVIDED HISTORY: Patient is a 59-year-old male who complains of abdominal pain. FINDINGS: Lower Chest: Bibasilar atelectasis, respiratory motion and parenchymal banding. Please see CT chest report from 09/08/2019 for details regarding intrathoracic findings. No free intra-abdominal air is identified. Organs: Bilateral adrenal glands, pancreas, liver, and kidneys grossly unremarkable in appearance. Excretion of contrast in the bilateral renal collecting systems and ureters. Opacified portions of the ureters grossly unremarkable. Striated pattern of the bilateral kidneys is most likely related to phase of enhancement. Prior cholecystectomy. Prior splenectomy. These findings are consistent with patient's history of sickle cell disease. GI/Bowel: No significant dilation of small bowel loops to suggest small bowel obstruction. Appendix is not definitively visualized. Pelvis: Urinary bladder is collapsed. No significant free fluid in the pelvis. No inguinal or pelvic sidewall lymphadenopathy. Peritoneum/Retroperitoneum: Abdominal aorta normal in appearance and caliber. Psoas muscles normal in size and symmetric in appearance. No retroperitoneal lymphadenopathy. Bones/Soft Tissues: No acute osseous abnormality identified. H-type vertebral bodies consistent with patient's history of sickle cell disease. 1. Prior cholecystectomy and splenectomy as well as morphology of the vertebral bodies consistent with patient's history of sickle cell disease. 2. Striated pattern of the bilateral renal parenchyma most likely related to phase of enhancement, but entities such as acute tubular necrosis or pyelonephritis not entirely excluded in the appropriate clinical setting. Please correlate with urinalysis and CBC. 3. Excretion of contrast in the bilateral renal collecting systems. 4. Nonvisualization of the appendix. 5. No evidence for small bowel obstruction. 6. Please see CT chest report from 09/08/2019 for details regarding intrathoracic findings. Xr Chest Portable    Result Date: 9/8/2019  EXAMINATION: ONE XRAY VIEW OF THE CHEST 9/8/2019 8:02 am COMPARISON: March 24, 2019 HISTORY: ORDERING SYSTEM PROVIDED HISTORY: chest pain TECHNOLOGIST PROVIDED HISTORY: chest pain Relevant Medical/Surgical History: chest pain nausea and vomiting FINDINGS: No focal consolidation. Minimal scarring at the right lung base is unchanged. No cardiomegaly. No pulmonary edema. No acute findings. No change. Ct Chest Pulmonary Embolism W Contrast    Result Date: 9/8/2019  EXAMINATION: CTA OF THE CHEST 9/8/2019 8:37 am TECHNIQUE: CTA of the chest was performed after the administration of intravenous contrast.  Multiplanar reformatted images are provided for review. MIP images are provided for review.  Dose modulation, iterative reconstruction, and/or

## 2019-09-09 NOTE — PROGRESS NOTES
Smoking Cessation - topics covered   []  Health Risks  []  Benefits of Quitting   []  Smoking Cessation  []  Patient has no history of tobacco use  []  Patient is former smoker. []  No need for tobacco cessation education. []  Booklet given  [x]  Patient verbalizes understanding. [x]  Patient denies need for tobacco cessation education. []  Unable to meet with patient today. Will follow up as able.   Nabeel Melissa  3:44 PM

## 2019-09-10 LAB
ABSOLUTE EOS #: 0 K/UL (ref 0–0.4)
ABSOLUTE IMMATURE GRANULOCYTE: 0.11 K/UL (ref 0–0.3)
ABSOLUTE LYMPH #: 2.66 K/UL (ref 1–4.8)
ABSOLUTE MONO #: 0.44 K/UL (ref 0.1–0.8)
ABSOLUTE RETIC #: 0.29 M/UL (ref 0.03–0.08)
ALBUMIN SERPL-MCNC: 3.6 G/DL (ref 3.5–5.2)
ALBUMIN/GLOBULIN RATIO: 1.4 (ref 1–2.5)
ALP BLD-CCNC: 76 U/L (ref 40–129)
ALT SERPL-CCNC: 1100 U/L (ref 5–41)
AMPHETAMINE: NEGATIVE NG/ML
ANION GAP SERPL CALCULATED.3IONS-SCNC: 9 MMOL/L (ref 9–17)
ANTI-NUCLEAR ANTIBODY (ANA): NEGATIVE
AST SERPL-CCNC: 864 U/L
BARBITURATES: NEGATIVE NG/ML
BASOPHILS # BLD: 0 % (ref 0–2)
BASOPHILS ABSOLUTE: 0 K/UL (ref 0–0.2)
BENZODIAZEPINES: NEGATIVE NG/ML
BILIRUB SERPL-MCNC: 4.88 MG/DL (ref 0.3–1.2)
BILIRUBIN DIRECT: 0.53 MG/DL
BILIRUBIN, INDIRECT: 4.35 MG/DL (ref 0–1)
BUN BLDV-MCNC: 5 MG/DL (ref 6–20)
BUN/CREAT BLD: ABNORMAL (ref 9–20)
BUPRENORPHINE: NEGATIVE NG/ML
CALCIUM SERPL-MCNC: 8.4 MG/DL (ref 8.6–10.4)
CHLORIDE BLD-SCNC: 103 MMOL/L (ref 98–107)
CO2: 27 MMOL/L (ref 20–31)
COCAINE: POSITIVE NG/ML
CREAT SERPL-MCNC: 0.58 MG/DL (ref 0.7–1.2)
DIFFERENTIAL TYPE: ABNORMAL
DRUGS OF ABUSE COMMENT: NORMAL
EBV EARLY ANTIGEN IGG: 32 U/ML
EBV INTERPRETATION: ABNORMAL
EBV NUCLEAR AG AB: 508 U/ML
EOSINOPHILS RELATIVE PERCENT: 0 % (ref 1–4)
EPSTEIN-BARR VCA IGG: 657 U/ML
EPSTEIN-BARR VCA IGM: 16 U/ML
GFR AFRICAN AMERICAN: >60 ML/MIN
GFR NON-AFRICAN AMERICAN: >60 ML/MIN
GFR SERPL CREATININE-BSD FRML MDRD: ABNORMAL ML/MIN/{1.73_M2}
GFR SERPL CREATININE-BSD FRML MDRD: ABNORMAL ML/MIN/{1.73_M2}
GLOBULIN: ABNORMAL G/DL (ref 1.5–3.8)
GLUCOSE BLD-MCNC: 85 MG/DL (ref 70–99)
HCT VFR BLD CALC: 29.2 % (ref 40.7–50.3)
HEMOGLOBIN: 10.1 G/DL (ref 13–17)
HEMOGLOBIN: 10.5 G/DL (ref 13–17)
HEMOGLOBIN: 10.8 G/DL (ref 13–17)
HERPES TYPE 1/2 IGM COMBINED: 0.38
HGB ELECTROPHORESIS INTERP: NORMAL
IMMATURE GRANULOCYTES: 1 %
IMMATURE RETIC FRACT: 22.1 % (ref 2.7–18.3)
LACTATE DEHYDROGENASE: 698 U/L (ref 135–225)
LYMPHOCYTES # BLD: 24 % (ref 24–44)
MCH RBC QN AUTO: 30.3 PG (ref 25.2–33.5)
MCHC RBC AUTO-ENTMCNC: 34.6 G/DL (ref 28.4–34.8)
MCV RBC AUTO: 87.7 FL (ref 82.6–102.9)
METHADONE: NEGATIVE NG/ML
METHAMPHETAMINE: NEGATIVE NG/ML
MITOCHONDRIAL ANTIBODY: 3.5 UNITS (ref 0–20)
MONOCYTES # BLD: 4 % (ref 1–7)
MORPHOLOGY: ABNORMAL
MORPHOLOGY: ABNORMAL
NRBC AUTOMATED: 5.9 PER 100 WBC
NUCLEATED RED BLOOD CELLS: 6 PER 100 WBC
OPIATES: NEGATIVE NG/ML
OXYCODONE: NEGATIVE NG/ML
PATHOLOGIST REVIEW: NORMAL
PATHOLOGIST: NORMAL
PDW BLD-RTO: 16.8 % (ref 11.8–14.4)
PHENCYCLIDINE: NEGATIVE NG/ML
PLATELET # BLD: 181 K/UL (ref 138–453)
PLATELET ESTIMATE: ABNORMAL
PMV BLD AUTO: 12.9 FL (ref 8.1–13.5)
POTASSIUM SERPL-SCNC: 4.3 MMOL/L (ref 3.7–5.3)
RBC # BLD: 3.33 M/UL (ref 4.21–5.77)
RBC # BLD: ABNORMAL 10*6/UL
RETIC %: 8.8 % (ref 0.5–1.9)
RETIC HEMOGLOBIN: 31.2 PG (ref 28.2–35.7)
SEG NEUTROPHILS: 71 % (ref 36–66)
SEGMENTED NEUTROPHILS ABSOLUTE COUNT: 7.89 K/UL (ref 1.8–7.7)
SMOOTH MUSCLE ANTIBODY: NORMAL
SODIUM BLD-SCNC: 139 MMOL/L (ref 135–144)
THC: POSITIVE NG/ML
TOTAL PROTEIN: 6.1 G/DL (ref 6.4–8.3)
VANCOMYCIN TROUGH DATE LAST DOSE: ABNORMAL
VANCOMYCIN TROUGH DOSE AMOUNT: ABNORMAL
VANCOMYCIN TROUGH TIME LAST DOSE: ABNORMAL
VANCOMYCIN TROUGH: 5.2 UG/ML (ref 10–20)
WBC # BLD: 11.1 K/UL (ref 3.5–11.3)
WBC # BLD: ABNORMAL 10*3/UL

## 2019-09-10 PROCEDURE — 6360000002 HC RX W HCPCS: Performed by: STUDENT IN AN ORGANIZED HEALTH CARE EDUCATION/TRAINING PROGRAM

## 2019-09-10 PROCEDURE — 80076 HEPATIC FUNCTION PANEL: CPT

## 2019-09-10 PROCEDURE — 6370000000 HC RX 637 (ALT 250 FOR IP): Performed by: STUDENT IN AN ORGANIZED HEALTH CARE EDUCATION/TRAINING PROGRAM

## 2019-09-10 PROCEDURE — APPNB45 APP NON BILLABLE 31-45 MINUTES: Performed by: INTERNAL MEDICINE

## 2019-09-10 PROCEDURE — 2580000003 HC RX 258: Performed by: STUDENT IN AN ORGANIZED HEALTH CARE EDUCATION/TRAINING PROGRAM

## 2019-09-10 PROCEDURE — 2580000003 HC RX 258: Performed by: INTERNAL MEDICINE

## 2019-09-10 PROCEDURE — 97116 GAIT TRAINING THERAPY: CPT

## 2019-09-10 PROCEDURE — 99232 SBSQ HOSP IP/OBS MODERATE 35: CPT | Performed by: FAMILY MEDICINE

## 2019-09-10 PROCEDURE — 80048 BASIC METABOLIC PNL TOTAL CA: CPT

## 2019-09-10 PROCEDURE — 99232 SBSQ HOSP IP/OBS MODERATE 35: CPT | Performed by: INTERNAL MEDICINE

## 2019-09-10 PROCEDURE — 85025 COMPLETE CBC W/AUTO DIFF WBC: CPT

## 2019-09-10 PROCEDURE — 36415 COLL VENOUS BLD VENIPUNCTURE: CPT

## 2019-09-10 PROCEDURE — 93970 EXTREMITY STUDY: CPT

## 2019-09-10 PROCEDURE — 97530 THERAPEUTIC ACTIVITIES: CPT

## 2019-09-10 PROCEDURE — 85018 HEMOGLOBIN: CPT

## 2019-09-10 PROCEDURE — 85045 AUTOMATED RETICULOCYTE COUNT: CPT

## 2019-09-10 PROCEDURE — 80202 ASSAY OF VANCOMYCIN: CPT

## 2019-09-10 PROCEDURE — 83615 LACTATE (LD) (LDH) ENZYME: CPT

## 2019-09-10 PROCEDURE — 2060000000 HC ICU INTERMEDIATE R&B

## 2019-09-10 RX ORDER — FENTANYL CITRATE 50 UG/ML
50 INJECTION, SOLUTION INTRAMUSCULAR; INTRAVENOUS
Status: DISCONTINUED | OUTPATIENT
Start: 2019-09-10 | End: 2019-09-11 | Stop reason: HOSPADM

## 2019-09-10 RX ADMIN — PIPERACILLIN AND TAZOBACTAM 3.38 G: 3; .375 INJECTION, POWDER, FOR SOLUTION INTRAVENOUS at 14:33

## 2019-09-10 RX ADMIN — FAMOTIDINE 20 MG: 20 TABLET, FILM COATED ORAL at 09:02

## 2019-09-10 RX ADMIN — FOLIC ACID 1 MG: 1 TABLET ORAL at 09:33

## 2019-09-10 RX ADMIN — PIPERACILLIN AND TAZOBACTAM 3.38 G: 3; .375 INJECTION, POWDER, FOR SOLUTION INTRAVENOUS at 05:44

## 2019-09-10 RX ADMIN — SODIUM CHLORIDE, PRESERVATIVE FREE 10 ML: 5 INJECTION INTRAVENOUS at 21:06

## 2019-09-10 RX ADMIN — SODIUM CHLORIDE: 9 INJECTION, SOLUTION INTRAVENOUS at 06:35

## 2019-09-10 RX ADMIN — SODIUM CHLORIDE: 9 INJECTION, SOLUTION INTRAVENOUS at 23:57

## 2019-09-10 RX ADMIN — FENTANYL CITRATE 50 MCG: 50 INJECTION INTRAMUSCULAR; INTRAVENOUS at 15:36

## 2019-09-10 RX ADMIN — SODIUM CHLORIDE: 9 INJECTION, SOLUTION INTRAVENOUS at 02:00

## 2019-09-10 RX ADMIN — FAMOTIDINE 20 MG: 20 TABLET, FILM COATED ORAL at 21:06

## 2019-09-10 RX ADMIN — Medication 1250 MG: at 16:45

## 2019-09-10 RX ADMIN — FENTANYL CITRATE 50 MCG: 50 INJECTION INTRAMUSCULAR; INTRAVENOUS at 23:57

## 2019-09-10 RX ADMIN — FENTANYL CITRATE 50 MCG: 50 INJECTION INTRAMUSCULAR; INTRAVENOUS at 18:56

## 2019-09-10 RX ADMIN — PIPERACILLIN AND TAZOBACTAM 3.38 G: 3; .375 INJECTION, POWDER, FOR SOLUTION INTRAVENOUS at 21:06

## 2019-09-10 RX ADMIN — VANCOMYCIN HYDROCHLORIDE 1250 MG: 10 INJECTION, POWDER, LYOPHILIZED, FOR SOLUTION INTRAVENOUS at 02:20

## 2019-09-10 RX ADMIN — Medication 1250 MG: at 23:57

## 2019-09-10 RX ADMIN — FENTANYL CITRATE 50 MCG: 50 INJECTION INTRAMUSCULAR; INTRAVENOUS at 09:02

## 2019-09-10 ASSESSMENT — ENCOUNTER SYMPTOMS
EYES NEGATIVE: 1
DIARRHEA: 0
NAUSEA: 0
ABDOMINAL PAIN: 1
VOMITING: 0
ABDOMINAL DISTENTION: 0
COUGH: 0
CHEST TIGHTNESS: 0
SHORTNESS OF BREATH: 0
RESPIRATORY NEGATIVE: 1

## 2019-09-10 ASSESSMENT — PAIN DESCRIPTION - LOCATION
LOCATION: HEAD;LEG
LOCATION: LEG

## 2019-09-10 ASSESSMENT — PAIN DESCRIPTION - PAIN TYPE: TYPE: CHRONIC PAIN

## 2019-09-10 ASSESSMENT — PAIN SCALES - GENERAL
PAINLEVEL_OUTOF10: 9
PAINLEVEL_OUTOF10: 7
PAINLEVEL_OUTOF10: 6
PAINLEVEL_OUTOF10: 8
PAINLEVEL_OUTOF10: 9

## 2019-09-10 ASSESSMENT — PAIN DESCRIPTION - ONSET: ONSET: ON-GOING

## 2019-09-10 ASSESSMENT — PAIN DESCRIPTION - PROGRESSION: CLINICAL_PROGRESSION: NOT CHANGED

## 2019-09-10 ASSESSMENT — PAIN DESCRIPTION - FREQUENCY: FREQUENCY: INTERMITTENT

## 2019-09-10 ASSESSMENT — PAIN DESCRIPTION - DESCRIPTORS
DESCRIPTORS: ACHING
DESCRIPTORS: ACHING

## 2019-09-10 ASSESSMENT — PAIN DESCRIPTION - ORIENTATION: ORIENTATION: RIGHT;LEFT

## 2019-09-10 NOTE — PROGRESS NOTES
Pharmacy Vancomycin Consult     Vancomycin Day: 3  Current Dosin Q 12 hours    Temp max:  98.8    Recent Labs     19  0545 09/10/19  0614   BUN 14 5*       Recent Labs     19  0545 09/10/19  0614   CREATININE 0.85 0.58*       Recent Labs     19  0545 09/10/19  0614   WBC 14.2* 11.1         Intake/Output Summary (Last 24 hours) at 9/10/2019 1519  Last data filed at 9/10/2019 1433  Gross per 24 hour   Intake 5211 ml   Output 2350 ml   Net 2861 ml       Culture Date      Source                       Results  19                 Blood                   NG x 2 days    Ht Readings from Last 1 Encounters:   19 6' (1.829 m)        Wt Readings from Last 1 Encounters:   09/10/19 185 lb 3 oz (84 kg)         Body mass index is 25.12 kg/m². Estimated Creatinine Clearance: 217 mL/min (A) (based on SCr of 0.58 mg/dL (L)). Trough: 5.2 ug/ml    Assessment/Plan:  Subtherapeutic trough of 5.2 ug/ml. Will increase the dosing regimen to every 8 hours. New vancomycin drug regimen of 1250mg Q 8 hours.      Thank you,     Veronica Fenton, PharmD   9/10/2019 3:20 PM

## 2019-09-10 NOTE — FLOWSHEET NOTE
Assessment:  Patient was watching TV with lights off when  entered. He immediately began talking about how no one cares about him and no one has come to visit. States that his birthday is coming soon and no one even cares that he is in the hospital.  Feels that no one loves him and has no one he can count on. Patient discussed how he has given up on the world and does not believe that love exists. States that his current girlfriend (ex-girlfriend) is about to give birth to his baby in about a month but that he cannot trust her. Patient talked at length about previous relationships, how they ended, and the dysfunctional nature of them. Patient went on to talk about the loss of his father when he was 16 and the impact it has had on his life. He states that his father was the only person that understood him. When he lost his dad, he went into a heavy grief and his mother was not sensitive and instead told him to ACCESS \[facility]\"" [facility] [facility] up. \"  He has since cut himself off emotionally from her and his siblings because he feels that they don't try to understand him and just give bad advice. Patient went on to question the meaning of life. Feels that all life amounts to is work and having babies. When asked about dreams, he hopes to have a family one day and good job where he can enjoy life on the weekends with his kids and maybe start a food truck business. States that he does love living but does not like the world he lives in. Spiritually, he is believes in God, demons, and ghosts. Seems that part of his spirituality is driven by the hope that he will be able to get in contact with his father who passed. Patient has a lot of unresolved grief around his father and the loss. He has not had a healthy outlet to process this grief and  recommended that he see someone professional who can help him through his feelings which the patient is open to.       Towards the end of the visit, the patient started

## 2019-09-10 NOTE — PROGRESS NOTES
Facility-Administered Medications   Medication Dose Route Frequency Provider Last Rate Last Dose    0.9 % sodium chloride infusion   Intravenous Continuous Ok Carlson  mL/hr at 09/09/19 2318      famotidine (PEPCID) tablet 20 mg  20 mg Oral BID Kadie Witt MD   20 mg at 09/09/19 2102    sodium chloride flush 0.9 % injection 10 mL  10 mL Intravenous 2 times per day Cami Woodward MD   10 mL at 09/09/19 2123    sodium chloride flush 0.9 % injection 10 mL  10 mL Intravenous PRN Cami Woodward MD   10 mL at 09/09/19 0217    magnesium hydroxide (MILK OF MAGNESIA) 400 MG/5ML suspension 30 mL  30 mL Oral Daily PRN Cami Woodward MD        ondansetron [facility]) injection 4 mg  4 mg Intravenous Q6H PRN Cami Woodward MD   4 mg at 09/09/19 0926    enoxaparin (LOVENOX) injection 40 mg  40 mg Subcutaneous Daily Cami Woodward MD        folic acid (FOLVITE) tablet 1 mg  1 mg Oral Daily Cami Woodward MD   1 mg at 09/09/19 1005    ondansetron (ZOFRAN) tablet 4 mg  4 mg Oral Q6H PRN Cami Woodward MD        nicotine polacrilex (NICORETTE) gum 2 mg  2 mg Oral PRN Cami Woodward MD        fentaNYL (SUBLIMAZE) injection 50 mcg  50 mcg Intravenous 4x Daily PRN Cami Woodward MD   50 mcg at 09/09/19 2103    vancomycin (VANCOCIN) intermittent dosing (placeholder)   Other RX Placeholder Marlee Ruiz DO        vancomycin (VANCOCIN) 1250 mg in dextrose 5 % 250 mL IVPB  1,250 mg Intravenous Q12H Marlee Ruiz DO   Stopped at 09/10/19 0359    piperacillin-tazobactam (ZOSYN) 3.375 g in dextrose 5 % 50 mL IVPB (mini-bag)  3.375 g Intravenous Q8H Cami Woodward MD 12.5 mL/hr at 09/10/19 0544 3.375 g at 09/10/19 0544    diphenhydrAMINE (BENADRYL) tablet 50 mg  50 mg Oral Q6H PRN Cami Woodward MD   50 mg at 09/09/19 0002    Calamine 8-8 % lotion 1 applicator  1 applicator Topical PRN Cami Woodward MD           Allergies:  Patient has no known allergies.     Social History:   reports that he Granulocytes 3 (H) 0 %    Seg Neutrophils 86 (H) 36 - 66 %    Lymphocytes 4 (L) 24 - 44 %    Monocytes 7 1 - 7 %    Eosinophils % 0 (L) 1 - 4 %    Basophils 0 0 - 2 %    nRBC 1 (H) 0 per 100 WBC    Absolute Immature Granulocyte 0.95 (H) 0.00 - 0.30 k/uL    Segs Absolute 27.18 (H) 1.8 - 7.7 k/uL    Absolute Lymph # 1.26 1.0 - 4.8 k/uL    Absolute Mono # 2.21 (H) 0.1 - 0.8 k/uL    Absolute Eos # 0.00 0.0 - 0.4 k/uL    Basophils Absolute 0.00 0.0 - 0.2 k/uL    Morphology ANISOCYTOSIS PRESENT     Morphology 1+ POLYCHROMASIA    Microscopic Urinalysis   Result Value Ref Range    -          WBC, UA 5 TO 10 0 - 5 /HPF    RBC, UA 0 TO 2 0 - 2 /HPF    Casts UA FINE GRANULAR 0 - 2 /LPF    Casts UA 10 TO 20 0 - 2 /LPF    Crystals UA NOT REPORTED None /HPF    Epithelial Cells UA 0 TO 2 0 - 5 /HPF    Renal Epithelial, Urine NOT REPORTED 0 /HPF    Bacteria, UA NOT REPORTED None    Mucus, UA NOT REPORTED None    Trichomonas, UA NOT REPORTED None    Amorphous, UA NOT REPORTED None    Other Observations UA NOT REPORTED NOT REQ.     Yeast, UA NOT REPORTED None   Protime-INR   Result Value Ref Range    Protime 14.4 (H) 9.0 - 12.0 sec    INR 1.4    APTT   Result Value Ref Range    PTT 25.0 20.5 - 30.5 sec   Hemoglobin and hematocrit, blood   Result Value Ref Range    POC Hemoglobin 12.3 (L) 13.5 - 17.5 g/dL    POC Hematocrit 36 (L) 41 - 53 %   SODIUM (POC)   Result Value Ref Range    POC Sodium 139 138 - 146 mmol/L   POTASSIUM (POC)   Result Value Ref Range    POC Potassium 5.0 (H) 3.5 - 4.5 mmol/L   CHLORIDE (POC)   Result Value Ref Range    POC Chloride 108 (H) 98 - 107 mmol/L   CALCIUM, IONIC (POC)   Result Value Ref Range    POC Ionized Calcium 0.99 (L) 1.15 - 1.33 mmol/L   RETICULOCYTES   Result Value Ref Range    Retic % 7.3 (H) 0.5 - 1.9 %    Absolute Retic # 0.240 (H) 0.030 - 0.080 M/uL    Immature Retic Fract 20.100 (H) 2.7 - 18.3 %    Retic Hemoglobin 29.0 28.2 - 35.7 pg   VITAMIN B12 & FOLATE   Result Value Ref Range MCHC (g/dL)     35.5                                          RDW (%)     16.0                                          PLT (k/uL)     172                                          RETIC (%)     7.3                                     Absolute RetCt     0.240                                                                                                                         PERIPHERAL EXAMINATION BY PATHOLOGIST    PLATELETS: Normal           LEUKOCYTES: Minimal neutrophilic left shift      ERYTHROCYTES: Marked target cells, polychromasia; sickle cells  present,  slight schistocytes      Mendy Padilla M.D.                    Source:  1: PERIPHERAL BLOOD FOR REVIEW BY PATHOLOGIST     Reticulocytes   Result Value Ref Range    Retic % 7.4 (H) 0.5 - 1.9 %    Absolute Retic # 0.252 (H) 0.030 - 0.080 M/uL    Immature Retic Fract 26.400 (H) 2.7 - 18.3 %    Retic Hemoglobin 28.8 28.2 - 35.7 pg   Lactate Dehydrogenase   Result Value Ref Range    LD 1,272 (H) 135 - 225 U/L   PROTIME-INR   Result Value Ref Range    Protime 12.6 (H) 9.0 - 12.0 sec    INR 1.2    HEMOGLOBIN   Result Value Ref Range    Hemoglobin 10.1 (L) 13.0 - 17.0 g/dL   Mixed Venous Gas, POC   Result Value Ref Range    PH MIXED 7.353 7.310 - 7.410    PCO2, Mixed 47.4 42.0 - 52.0 mm Hg    PO2, Mixed 92.3 (H) 35.0 - 45.0 mm Hg    HCO3, Mixed 26.3 23.0 - 29.0 mmol/L    tCO2, Mixed 28 24.0 - 30.0 mmol/L    Negative Base Excess, Mixed NOT REPORTED 0.0 - 2.0    Positive Base Excess, Mixed 0 0.0 - 3.0    O2 Sat, Mixed 97 (H) 60.0 - 80.0 %    O2 Device/Flow/% NOT REPORTED     Alok Test NOT REPORTED     Sample Site NOT REPORTED     Mode NOT REPORTED     FIO2 NOT REPORTED     Pt Temp NOT REPORTED     POC pH Temp NOT REPORTED     POC pCO2 Temp NOT REPORTED mm Hg    POC pO2 Temp NOT REPORTED mm Hg   Creatinine W/GFR Point of Care   Result Value Ref Range    POC Creatinine 1.37 (H) 0.51 - 1.19 mg/dL    GFR Comment >60 >60 mL/min    GFR

## 2019-09-10 NOTE — PROGRESS NOTES
HEMOGLOBIN    Collection Time: 09/09/19  1:29 PM   Result Value Ref Range    Hemoglobin 10.1 (L) 13.0 - 17.0 g/dL   PROTIME-INR    Collection Time: 09/09/19  1:29 PM   Result Value Ref Range    Protime 12.6 (H) 9.0 - 12.0 sec    INR 1.2    HEMOGLOBIN    Collection Time: 09/09/19  6:40 PM   Result Value Ref Range    Hemoglobin 10.4 (L) 13.0 - 17.0 g/dL   HEMOGLOBIN    Collection Time: 09/09/19 11:20 PM   Result Value Ref Range    Hemoglobin 10.1 (L) 13.0 - 17.0 g/dL   CBC auto differential    Collection Time: 09/10/19  6:14 AM   Result Value Ref Range    WBC 11.1 3.5 - 11.3 k/uL    RBC 3.33 (L) 4.21 - 5.77 m/uL    Hemoglobin 10.1 (L) 13.0 - 17.0 g/dL    Hematocrit 29.2 (L) 40.7 - 50.3 %    MCV 87.7 82.6 - 102.9 fL    MCH 30.3 25.2 - 33.5 pg    MCHC 34.6 28.4 - 34.8 g/dL    RDW 16.8 (H) 11.8 - 14.4 %    Platelets 744 656 - 149 k/uL    MPV 12.9 8.1 - 13.5 fL    NRBC Automated 5.9 (H) 0.0 per 100 WBC    Differential Type NOT REPORTED     WBC Morphology NOT REPORTED     RBC Morphology NOT REPORTED     Platelet Estimate NOT REPORTED     Immature Granulocytes 1 (H) 0 %    Seg Neutrophils 71 (H) 36 - 66 %    Lymphocytes 24 24 - 44 %    Monocytes 4 1 - 7 %    Eosinophils % 0 (L) 1 - 4 %    Basophils 0 0 - 2 %    nRBC 6 (H) 0 per 100 WBC    Absolute Immature Granulocyte 0.11 0.00 - 0.30 k/uL    Segs Absolute 7.89 (H) 1.8 - 7.7 k/uL    Absolute Lymph # 2.66 1.0 - 4.8 k/uL    Absolute Mono # 0.44 0.1 - 0.8 k/uL    Absolute Eos # 0.00 0.0 - 0.4 k/uL    Basophils Absolute 0.00 0.0 - 0.2 k/uL    Morphology ANISOCYTOSIS PRESENT     Morphology 1+ POLYCHROMASIA    Basic Metabolic Panel w/ Reflex to MG    Collection Time: 09/10/19  6:14 AM   Result Value Ref Range    Glucose 85 70 - 99 mg/dL    BUN 5 (L) 6 - 20 mg/dL    CREATININE 0.58 (L) 0.70 - 1.20 mg/dL    Bun/Cre Ratio NOT REPORTED 9 - 20    Calcium 8.4 (L) 8.6 - 10.4 mg/dL    Sodium 139 135 - 144 mmol/L    Potassium 4.3 3.7 - 5.3 mmol/L    Chloride 103 98 - 107 mmol/L    CO2

## 2019-09-10 NOTE — PROGRESS NOTES
ruthie, narrow JEMMA  Gait Deviations: Decreased step length;Decreased step height;Slow Ruthie;Deviated path  Distance: 125 ft x1  Comments: pt responds well to cueing to increase JEMMA but relied heavily on IV pole for stability, stop n go gait pattern 2* increasing B calf pain during ambulation. RN notified and present. She is going to suggest MD's order dopplers to rule out DVT's. Stairs/Curb  Stairs?: No     Balance  Posture: Good  Sitting - Static: Good  Standing - Static: Good;-  Standing - Dynamic: Fair  Comments: standing balance assessed with IV pole      Comment: Pt toileted post walk then returned to supine. Will progress with strength, balance and gait goals post ultrasound results. Goals  Short term goals  Time Frame for Short term goals: 14 Visits   Short term goal 1: Pt will complete bed mobility independently. Short term goal 2: Pt will perform all transfers independently. Short term goal 3: Pt will ambulate 300 ft without AD supervision. Short term goal 4: Pt will negotiate 3 steps using RHR supervision. Short term goal 5: Pt will tolerate 45 min of activity in order to improve strength and endurance. Short term goal 6: Pt will improve dynamic standing balance to Good. Plan  Times per week: 5-6x/wk  Times per day: Daily  Current Treatment Recommendations: Strengthening, Gait Training, Stair training, Balance Training, Functional Mobility Training, Endurance Training, Transfer Training, Safety Education & Training  Safety Devices  Type of devices:  All fall risk precautions in place, Call light within reach, Gait belt, Patient at risk for falls, Left in bed     Therapy Time   Individual Concurrent Group Co-treatment   Time In  1145         Time Out  1210         Minutes  25                 TREVA AGUIRRE, PTA

## 2019-09-10 NOTE — PROGRESS NOTES
09/10/19  0614     --  138 139   K 4.8  --  3.7 4.3   CL 97*  --  99 103   CO2 25  --  27 27   BUN 21*  --  14 5*   CREATININE 1.12 1.37* 0.85 0.58*   GLUCOSE 116*  --  101* 85   CALCIUM 9.3  --  8.4* 8.4*       LFTS  Recent Labs     09/08/19  1621 09/09/19  0545 09/10/19  0614   ALKPHOS 89 78 76   ALT 2,645* 1,841* 1,100*   AST >7,000* 2,666* 864*   PROT 7.4 6.6 6.1*   BILITOT 7.15* 5.51* 4.88*   BILIDIR 0.62* 0.36* 0.53*   LABALBU 4.5 3.9 3.6       AMYLASE/LIPASE/AMMONIA  Recent Labs     09/09/19  0941   AMMONIA 22       PT/INR  Recent Labs     09/08/19  0753 09/09/19  1329   PROTIME 14.4* 12.6*   INR 1.4 1.2       ANEMIA STUDIES  Recent Labs     09/08/19  1621 09/09/19  0545   LABIRON  --  88*   TIBC  --  297   IRON  --  261*   FERRITIN  --  14,250*   [id number] >2000*  --    FOLATE >20.0  --        Acute Hepatitis Panel   Lab Results   Component Value Date    HEPBSAG NONREACTIVE 09/08/2019    HEPCAB NONREACTIVE 09/08/2019    HEPBIGM NONREACTIVE 09/08/2019    HEPAIGM NONREACTIVE 09/08/2019       LIVER WORK UP    ACETAMINOPHEN   Lab Results   Component Value Date    ACTMNPHEN <5 09/09/2019       AFP  No results found for: AFP     Alpha 1 antitrypsin   Lab Results   Component Value Date    A1A 168 09/09/2019       Anti - Liver/Kidney Ab - Pending  No results found for: LIVER-KIDNEYMICROSOMALAB    SHIRA  Lab Results   Component Value Date    SHIRA NEGATIVE 09/09/2019       AMA - Pending  No results found for: MITOAB    ASMA  Lab Results   Component Value Date    SMOOTHMUSCAB 1:40 09/09/2019       Ceruloplasmin  Lab Results   Component Value Date    CERULOPLSM 28 09/09/2019       Celiac panel  Lab Results   Component Value Date    TTGIGA 0.5 09/09/2019     09/09/2019       IgG  Lab Results   Component Value Date    IGG 1246 09/09/2019       IgM  Lab Results   Component Value Date    IGM 30 09/09/2019       GGT   No results found for: LABGGT      IMAGING  Ct Abdomen Pelvis W Iv Contrast Additional Contrast? consistent with patient's history of sickle cell disease. 2. Striated pattern of the bilateral renal parenchyma most likely related to phase of enhancement, but entities such as acute tubular necrosis or pyelonephritis not entirely excluded in the appropriate clinical setting. Please correlate with urinalysis and CBC. 3. Excretion of contrast in the bilateral renal collecting systems. 4. Nonvisualization of the appendix. 5. No evidence for small bowel obstruction. 6. Please see CT chest report from 09/08/2019 for details regarding intrathoracic findings. Us Liver    Result Date: 9/9/2019  EXAMINATION: RIGHT UPPER QUADRANT ULTRASOUND 9/9/2019 8:17 am COMPARISON: CT abdomen pelvis from 09/08/2019 HISTORY: ORDERING SYSTEM PROVIDED HISTORY: ABNORMAL LIVER FUNCTION TESTS 24-year-old male with abnormal LFTs FINDINGS: LIVER:  Slight heterogeneous increased echogenicity throughout the hepatic parenchyma with can be seen with diffuse hepatocellular disease and/or fatty infiltration of the liver. Liver length measures 21.1 cm. Color flow projects over the main portal vein with a normal hepatopetal, monophasic waveform. BILIARY SYSTEM:  Prior cholecystectomy. Common bile duct is within normal limits measuring 4 mm. RIGHT KIDNEY: Right kidney measures 12.9 x 6.8 x 6.2 cm. Right renal cortical thickness measures 1.7 cm. Limited visualization of the right kidney demonstrates no gross right-sided hydronephrosis. PANCREAS:  Visualized portions of the pancreas are unremarkable. OTHER: No evidence of right upper quadrant ascites. 1. Appearance of the liver can be seen with diffuse hepatocellular disease and/or fatty infiltration of the liver. 2. Prior cholecystectomy. Common bile duct normal in caliber measuring 4 mm.      Xr Chest Portable    Result Date: 9/8/2019  EXAMINATION: ONE XRAY VIEW OF THE CHEST 9/8/2019 8:02 am COMPARISON: March 24, 2019 HISTORY: ORDERING SYSTEM PROVIDED HISTORY: chest pain TECHNOLOGIST PROVIDED HISTORY: chest pain Relevant Medical/Surgical History: chest pain nausea and vomiting FINDINGS: No focal consolidation. Minimal scarring at the right lung base is unchanged. No cardiomegaly. No pulmonary edema. No acute findings. No change. Ct Chest Pulmonary Embolism W Contrast    Result Date: 9/8/2019  EXAMINATION: CTA OF THE CHEST 9/8/2019 8:37 am TECHNIQUE: CTA of the chest was performed after the administration of intravenous contrast.  Multiplanar reformatted images are provided for review. MIP images are provided for review. Dose modulation, iterative reconstruction, and/or weight based adjustment of the mA/kV was utilized to reduce the radiation dose to as low as reasonably achievable. COMPARISON: CT chest from 03/24/2019 HISTORY: ORDERING SYSTEM PROVIDED HISTORY: PE study Patient is a 20-year-old male with possible pulmonary embolus. FINDINGS: Pulmonary Arteries: Evaluation of the pulmonary arterial vasculature is markedly limited due to suboptimal bolus timing and respiratory motion. No obvious filling defect identified in the main, right main, left main pulmonary arterial vasculature. Evaluation of the lobar pulmonary arterial vasculature and beyond is severely limited for the assessment of pulmonary embolus. Mediastinum: Visualized thyroid gland grossly unremarkable in appearance. No axillary, mediastinal, or hilar lymphadenopathy. No periaortic or mediastinal hemorrhage. No pericardial or pleural effusions. Lungs/pleura: Trachea and proximal central airways appear patent. Respiratory motion atelectasis and parenchymal banding in the lower lung zones. A 5 mm pulmonary nodule in the right upper lobe on image 46, series 4, unchanged from 03/24/2019. Stable 5 mm pulmonary nodule in the medial right lower lobe on image 86, series 4, unchanged from 03/24/2019. Multiple additional nodular opacities in both lungs in association with vascular structures are stable from 03/24/2019.   No management per primary team  4. Please call GI with any questions, concerns or acute change in clinic status     This plan was formulated in collaboration with Dr. Corey Hernandez    Electronically signed by Carlos Benavidez CNP on 9/10/2019 at 7:34 AM    Please note that this note was generated using a voice recognition dictation software. Although every effort was made to ensure the accuracy of this automated transcription, some errors in transcription may have occurred. Attending Physician Statement  I have discussed the care of Brett D Gladis and   I have examined the patient myselft independently, and taken ros and hpi , including pertinent history and exam findings,  with the author of this note . I have reviewed the key elements of all parts of the encounter with the nurse practitioner/resident.     I agree with the assessment, plan and orders as documented by the above health care provider       Electronically signed by Crystal Cole MD

## 2019-09-11 VITALS
SYSTOLIC BLOOD PRESSURE: 125 MMHG | RESPIRATION RATE: 17 BRPM | BODY MASS INDEX: 25.08 KG/M2 | HEIGHT: 72 IN | WEIGHT: 185.19 LBS | OXYGEN SATURATION: 72 % | DIASTOLIC BLOOD PRESSURE: 88 MMHG | TEMPERATURE: 98.6 F | HEART RATE: 76 BPM

## 2019-09-11 LAB — LIVER-KIDNEY MICROSOMAL AB: NORMAL

## 2019-09-11 PROCEDURE — 97530 THERAPEUTIC ACTIVITIES: CPT

## 2019-09-11 PROCEDURE — 6360000002 HC RX W HCPCS: Performed by: STUDENT IN AN ORGANIZED HEALTH CARE EDUCATION/TRAINING PROGRAM

## 2019-09-11 PROCEDURE — 6370000000 HC RX 637 (ALT 250 FOR IP): Performed by: STUDENT IN AN ORGANIZED HEALTH CARE EDUCATION/TRAINING PROGRAM

## 2019-09-11 PROCEDURE — 2580000003 HC RX 258: Performed by: STUDENT IN AN ORGANIZED HEALTH CARE EDUCATION/TRAINING PROGRAM

## 2019-09-11 PROCEDURE — 99232 SBSQ HOSP IP/OBS MODERATE 35: CPT | Performed by: INTERNAL MEDICINE

## 2019-09-11 PROCEDURE — 99239 HOSP IP/OBS DSCHRG MGMT >30: CPT | Performed by: FAMILY MEDICINE

## 2019-09-11 PROCEDURE — 2580000003 HC RX 258: Performed by: INTERNAL MEDICINE

## 2019-09-11 PROCEDURE — 90792 PSYCH DIAG EVAL W/MED SRVCS: CPT | Performed by: PSYCHIATRY & NEUROLOGY

## 2019-09-11 RX ORDER — OXYCODONE HYDROCHLORIDE AND ACETAMINOPHEN 5; 325 MG/1; MG/1
1 TABLET ORAL 4 TIMES DAILY
Qty: 28 TABLET | Refills: 0 | Status: SHIPPED | OUTPATIENT
Start: 2019-09-11 | End: 2019-09-18

## 2019-09-11 RX ADMIN — PIPERACILLIN AND TAZOBACTAM 3.38 G: 3; .375 INJECTION, POWDER, FOR SOLUTION INTRAVENOUS at 05:03

## 2019-09-11 RX ADMIN — Medication 1250 MG: at 08:58

## 2019-09-11 RX ADMIN — PIPERACILLIN AND TAZOBACTAM 3.38 G: 3; .375 INJECTION, POWDER, FOR SOLUTION INTRAVENOUS at 14:16

## 2019-09-11 RX ADMIN — FOLIC ACID 1 MG: 1 TABLET ORAL at 08:35

## 2019-09-11 RX ADMIN — ENOXAPARIN SODIUM 40 MG: 40 INJECTION SUBCUTANEOUS at 08:35

## 2019-09-11 RX ADMIN — FENTANYL CITRATE 50 MCG: 50 INJECTION INTRAMUSCULAR; INTRAVENOUS at 18:05

## 2019-09-11 RX ADMIN — FAMOTIDINE 20 MG: 20 TABLET, FILM COATED ORAL at 08:35

## 2019-09-11 RX ADMIN — SODIUM CHLORIDE, PRESERVATIVE FREE 10 ML: 5 INJECTION INTRAVENOUS at 08:35

## 2019-09-11 RX ADMIN — FENTANYL CITRATE 50 MCG: 50 INJECTION INTRAMUSCULAR; INTRAVENOUS at 05:03

## 2019-09-11 RX ADMIN — SODIUM CHLORIDE: 9 INJECTION, SOLUTION INTRAVENOUS at 14:16

## 2019-09-11 RX ADMIN — Medication 1250 MG: at 14:17

## 2019-09-11 RX ADMIN — FENTANYL CITRATE 50 MCG: 50 INJECTION INTRAMUSCULAR; INTRAVENOUS at 14:18

## 2019-09-11 RX ADMIN — FENTANYL CITRATE 50 MCG: 50 INJECTION INTRAMUSCULAR; INTRAVENOUS at 08:58

## 2019-09-11 RX ADMIN — FENTANYL CITRATE 50 MCG: 50 INJECTION INTRAMUSCULAR; INTRAVENOUS at 02:07

## 2019-09-11 ASSESSMENT — PAIN - FUNCTIONAL ASSESSMENT
PAIN_FUNCTIONAL_ASSESSMENT: PREVENTS OR INTERFERES SOME ACTIVE ACTIVITIES AND ADLS

## 2019-09-11 ASSESSMENT — PAIN SCALES - GENERAL
PAINLEVEL_OUTOF10: 10
PAINLEVEL_OUTOF10: 8
PAINLEVEL_OUTOF10: 0
PAINLEVEL_OUTOF10: 8
PAINLEVEL_OUTOF10: 7

## 2019-09-11 ASSESSMENT — PAIN SCALES - WONG BAKER: WONGBAKER_NUMERICALRESPONSE: 0

## 2019-09-11 ASSESSMENT — PAIN DESCRIPTION - PAIN TYPE
TYPE: CHRONIC PAIN;ACUTE PAIN

## 2019-09-11 ASSESSMENT — PAIN DESCRIPTION - ORIENTATION
ORIENTATION: RIGHT;LEFT

## 2019-09-11 ASSESSMENT — PAIN DESCRIPTION - FREQUENCY
FREQUENCY: CONTINUOUS

## 2019-09-11 ASSESSMENT — PAIN DESCRIPTION - PROGRESSION
CLINICAL_PROGRESSION: NOT CHANGED

## 2019-09-11 ASSESSMENT — PAIN DESCRIPTION - DESCRIPTORS
DESCRIPTORS: ACHING

## 2019-09-11 ASSESSMENT — PAIN DESCRIPTION - ONSET
ONSET: ON-GOING

## 2019-09-11 ASSESSMENT — PAIN DESCRIPTION - LOCATION
LOCATION: LEG
LOCATION: CHEST;LEG

## 2019-09-11 NOTE — PLAN OF CARE
Problem: Pain:  Goal: Pain level will decrease  Description  Pain level will decrease  9/11/2019 1653 by Kash Sierra RN  Outcome: Completed  9/11/2019 0445 by Maryan Kinney RN  Outcome: Ongoing  Goal: Control of acute pain  Description  Control of acute pain  Outcome: Completed  Goal: Control of chronic pain  Description  Control of chronic pain  Outcome: Completed     Problem: Falls - Risk of:  Goal: Will remain free from falls  Description  Will remain free from falls  9/11/2019 1653 by Kash Sierra RN  Outcome: Completed  9/11/2019 0445 by Maryan Kinney RN  Outcome: Met This Shift  Goal: Absence of physical injury  Description  Absence of physical injury  Outcome: Completed

## 2019-09-11 NOTE — PROGRESS NOTES
IND in street clothes, proceed to walk outside in preparation for d/c papers to be completed. Goals  Short term goals  Time Frame for Short term goals: 14 Visits   Short term goal 1: Pt will complete bed mobility independently. Short term goal 2: Pt will perform all transfers independently. Short term goal 3: Pt will ambulate 300 ft without AD supervision. Short term goal 4: Pt will negotiate 3 steps using RHR supervision. Short term goal 5: Pt will tolerate 45 min of activity in order to improve strength and endurance. Short term goal 6: Pt will improve dynamic standing balance to Good. Plan  Times per week: 5-6x/wk  Times per day: Daily  Current Treatment Recommendations: Strengthening, Gait Training, Stair training, Balance Training, Functional Mobility Training, Endurance Training, Transfer Training, Safety Education & Training  Safety Devices  Type of devices:  All fall risk precautions in place, Call light within reach, Gait belt, Patient at risk for falls, Left in bed     Therapy Time   Individual Concurrent Group Co-treatment   Time In  1500         Time Out  1515         Minutes  15                 TREVA Schmid, PTA

## 2019-09-11 NOTE — PROGRESS NOTES
history. He has never used smokeless tobacco. He reports that he has current or past drug history. Frequency: 1.00 time per week. He reports that he does not drink alcohol. Family History: family history includes Sickle Cell Anemia in his brother. REVIEW OF SYSTEMS:      Constitutional: No fever or chills. No night sweats, no weight loss   Eyes: No eye discharge, double vision, or eye pain   HEENT: negative for sore mouth, sore throat, hoarseness and voice change   Respiratory: negative for cough , sputum, dyspnea, wheezing, hemoptysis, chest pain   Cardiovascular: negative for chest pain, dyspnea, palpitations, orthopnea, PND   Gastrointestinal: Positive for nausea vomiting diarrhea. Genitourinary: negative for frequency, dysuria, nocturia, urinary incontinence, and hematuria   Integument: negative for rash, skin lesions, bruises. Positive jaundice. Hematologic/Lymphatic: negative for easy bruising, bleeding, lymphadenopathy, or petechiae   Endocrine: negative for heat or cold intolerance,weight changes, change in bowel habits and hair loss   Musculoskeletal: negative for myalgias, arthralgias, pain, joint swelling,and bone pain   Neurological: negative for headaches, dizziness, seizures, weakness, numbness    PHYSICAL EXAM:        /82   Pulse 64   Temp 98.1 °F (36.7 °C) (Oral)   Resp 18   Ht 6' (1.829 m)   Wt 185 lb 3 oz (84 kg)   SpO2 94%   BMI 25.12 kg/m²    Temp (24hrs), Av.8 °F (37.1 °C), Min:98.1 °F (36.7 °C), Max:99.6 °F (37.6 °C)      General appearance -sick appearing  Mental status - alert and cooperative   Eyes - pupils equal and reactive, extraocular eye movements intact.   Positive icterus  Ears - bilateral TM's and external ear canals normal   Mouth - mucous membranes moist, pharynx normal without lesions   Neck - supple, no significant adenopathy   Lymphatics - no palpable lymphadenopathy, no hepatosplenomegaly   Chest - clear to auscultation, no wheezes, rales or rhonchi, symmetric air entry   Heart - normal rate, regular rhythm, normal S1, S2, no murmurs  Abdomen -generalized tenderness  Neurological - alert, oriented, normal speech, no focal findings or movement disorder noted   Musculoskeletal - no joint tenderness, deformity or swelling   Extremities - peripheral pulses normal, no pedal edema, no clubbing or cyanosis   Skin - normal coloration and turgor, no rashes, no suspicious skin lesions noted ,      DATA:      Labs:     CBC:   Recent Labs     09/09/19  0545  09/10/19  0614 09/10/19  1321 09/10/19  1913   WBC 14.2*  --  11.1  --   --    HGB 10.7*   < > 10.1* 10.8* 10.5*   HCT 29.4*  --  29.2*  --   --      --  181  --   --     < > = values in this interval not displayed. BMP:   Recent Labs     09/09/19  0545 09/10/19  0614    139   K 3.7 4.3   CO2 27 27   BUN 14 5*   CREATININE 0.85 0.58*   LABGLOM >60 >60   GLUCOSE 101* 85     PT/INR:   Recent Labs     09/08/19  0753 09/09/19  1329   PROTIME 14.4* 12.6*   INR 1.4 1.2     APTT:  Recent Labs     09/08/19  0753   APTT 25.0     LIVER PROFILE:  Recent Labs     09/09/19  0545 09/10/19  0614   AST 2,666* 864*   ALT 1,841* 1,100*   LABALBU 3.9 3.6       Results for orders placed or performed during the hospital encounter of 09/08/19   Culture Blood #1   Result Value Ref Range    Specimen Description . BLOOD     Special Requests L HAND 10ML     Culture NO GROWTH 3 DAYS    Culture Blood #1   Result Value Ref Range    Specimen Description . BLOOD     Special Requests R FA 9ML     Culture NO GROWTH 3 DAYS    Urine Culture   Result Value Ref Range    Specimen Description . URINE     Special Requests NOT REPORTED     Culture NO GROWTH    Basic Metabolic Panel   Result Value Ref Range    Glucose 116 (H) 70 - 99 mg/dL    BUN 21 (H) 6 - 20 mg/dL    CREATININE 1.12 0.70 - 1.20 mg/dL    Bun/Cre Ratio NOT REPORTED 9 - 20    Calcium 9.3 8.6 - 10.4 mg/dL    Sodium 140 135 - 144 mmol/L    Potassium 4.8 3.7 - 5.3 mmol/L    Chloride 97 (L) 98 - 107 mmol/L    CO2 25 20 - 31 mmol/L    Anion Gap 18 (H) 9 - 17 mmol/L    GFR Non-African American >60 >60 mL/min    GFR African American >60 >60 mL/min    GFR Comment          GFR Staging NOT REPORTED    HEPATIC FUNCTION PANEL   Result Value Ref Range    Alb 5.1 3.5 - 5.2 g/dL    Alkaline Phosphatase 107 40 - 129 U/L    ALT 2,927 (H) 5 - 41 U/L    AST 5,439 (H) <40 U/L    Total Bilirubin 8.62 (H) 0.3 - 1.2 mg/dL    Bilirubin, Direct 0.88 (H) <0.31 mg/dL    Bilirubin, Indirect 7.74 (H) 0.00 - 1.00 mg/dL    Total Protein 8.6 (H) 6.4 - 8.3 g/dL    Globulin NOT REPORTED 1.5 - 3.8 g/dL    Albumin/Globulin Ratio 1.5 1.0 - 2.5   Urinalysis Reflex to Culture   Result Value Ref Range    Color, UA DARK YELLOW (A) YELLOW    Turbidity UA TURBID (A) CLEAR    Glucose, Ur NEGATIVE NEGATIVE    Bilirubin Urine NEGATIVE NEGATIVE    Ketones, Urine NEGATIVE NEGATIVE    Specific Gravity, UA 1.011 1.005 - 1.030    Urine Hgb LARGE (A) NEGATIVE    pH, UA 5.0 5.0 - 8.0    Protein, UA 2+ (A) NEGATIVE    Urobilinogen, Urine Normal Normal    Nitrite, Urine NEGATIVE NEGATIVE    Leukocyte Esterase, Urine NEGATIVE NEGATIVE    Urinalysis Comments NOT REPORTED    Lactic Acid, Whole Blood   Result Value Ref Range    Lactic Acid, Whole Blood 2.8 (H) 0.7 - 2.1 mmol/L   CBC Auto Differential   Result Value Ref Range    WBC 31.6 (HH) 3.5 - 11.3 k/uL    RBC 4.04 (L) 4.21 - 5.77 m/uL    Hemoglobin 12.6 (L) 13.0 - 17.0 g/dL    Hematocrit 34.2 (L) 40.7 - 50.3 %    MCV 84.7 82.6 - 102.9 fL    MCH 31.2 25.2 - 33.5 pg    MCHC 36.8 (H) 28.4 - 34.8 g/dL    RDW 15.4 (H) 11.8 - 14.4 %    Platelets 296 620 - 292 k/uL    MPV 13.0 8.1 - 13.5 fL    NRBC Automated 0.4 (H) 0.0 per 100 WBC    Differential Type NOT REPORTED     WBC Morphology NOT REPORTED     RBC Morphology NOT REPORTED     Platelet Estimate NOT REPORTED     Immature Granulocytes 3 (H) 0 %    Seg Neutrophils 86 (H) 36 - 66 %    Lymphocytes 4 (L) 24 - 44 %    Monocytes 7 1 - 7 %    Eosinophils % 0 (L) 1 - 4 %    Basophils 0 0 - 2 %    nRBC 1 (H) 0 per 100 WBC    Absolute Immature Granulocyte 0.95 (H) 0.00 - 0.30 k/uL    Segs Absolute 27.18 (H) 1.8 - 7.7 k/uL    Absolute Lymph # 1.26 1.0 - 4.8 k/uL    Absolute Mono # 2.21 (H) 0.1 - 0.8 k/uL    Absolute Eos # 0.00 0.0 - 0.4 k/uL    Basophils Absolute 0.00 0.0 - 0.2 k/uL    Morphology ANISOCYTOSIS PRESENT     Morphology 1+ POLYCHROMASIA    Microscopic Urinalysis   Result Value Ref Range    -          WBC, UA 5 TO 10 0 - 5 /HPF    RBC, UA 0 TO 2 0 - 2 /HPF    Casts UA FINE GRANULAR 0 - 2 /LPF    Casts UA 10 TO 20 0 - 2 /LPF    Crystals UA NOT REPORTED None /HPF    Epithelial Cells UA 0 TO 2 0 - 5 /HPF    Renal Epithelial, Urine NOT REPORTED 0 /HPF    Bacteria, UA NOT REPORTED None    Mucus, UA NOT REPORTED None    Trichomonas, UA NOT REPORTED None    Amorphous, UA NOT REPORTED None    Other Observations UA NOT REPORTED NOT REQ.     Yeast, UA NOT REPORTED None   Protime-INR   Result Value Ref Range    Protime 14.4 (H) 9.0 - 12.0 sec    INR 1.4    APTT   Result Value Ref Range    PTT 25.0 20.5 - 30.5 sec   Hemoglobin and hematocrit, blood   Result Value Ref Range    POC Hemoglobin 12.3 (L) 13.5 - 17.5 g/dL    POC Hematocrit 36 (L) 41 - 53 %   SODIUM (POC)   Result Value Ref Range    POC Sodium 139 138 - 146 mmol/L   POTASSIUM (POC)   Result Value Ref Range    POC Potassium 5.0 (H) 3.5 - 4.5 mmol/L   CHLORIDE (POC)   Result Value Ref Range    POC Chloride 108 (H) 98 - 107 mmol/L   CALCIUM, IONIC (POC)   Result Value Ref Range    POC Ionized Calcium 0.99 (L) 1.15 - 1.33 mmol/L   RETICULOCYTES   Result Value Ref Range    Retic % 7.3 (H) 0.5 - 1.9 %    Absolute Retic # 0.240 (H) 0.030 - 0.080 M/uL    Immature Retic Fract 20.100 (H) 2.7 - 18.3 %    Retic Hemoglobin 29.0 28.2 - 35.7 pg   VITAMIN B12 & FOLATE   Result Value Ref Range    Vitamin B-12 >2000 (H) 232 - 1245 pg/mL    Folate >20.0 >4.8 ng/mL   PERIPHERAL BLOOD SMEAR, PATH REVIEW   Result Value Ref Range Reticulocytes   Result Value Ref Range    Retic % 7.4 (H) 0.5 - 1.9 %    Absolute Retic # 0.252 (H) 0.030 - 0.080 M/uL    Immature Retic Fract 26.400 (H) 2.7 - 18.3 %    Retic Hemoglobin 28.8 28.2 - 35.7 pg   Lactate Dehydrogenase   Result Value Ref Range    LD 1,272 (H) 135 - 225 U/L   PROTIME-INR   Result Value Ref Range    Protime 12.6 (H) 9.0 - 12.0 sec    INR 1.2    HEMOGLOBIN   Result Value Ref Range    Hemoglobin 10.1 (L) 13.0 - 17.0 g/dL   CBC auto differential   Result Value Ref Range    WBC 11.1 3.5 - 11.3 k/uL    RBC 3.33 (L) 4.21 - 5.77 m/uL    Hemoglobin 10.1 (L) 13.0 - 17.0 g/dL    Hematocrit 29.2 (L) 40.7 - 50.3 %    MCV 87.7 82.6 - 102.9 fL    MCH 30.3 25.2 - 33.5 pg    MCHC 34.6 28.4 - 34.8 g/dL    RDW 16.8 (H) 11.8 - 14.4 %    Platelets 485 957 - 436 k/uL    MPV 12.9 8.1 - 13.5 fL    NRBC Automated 5.9 (H) 0.0 per 100 WBC    Differential Type NOT REPORTED     WBC Morphology NOT REPORTED     RBC Morphology NOT REPORTED     Platelet Estimate NOT REPORTED     Immature Granulocytes 1 (H) 0 %    Seg Neutrophils 71 (H) 36 - 66 %    Lymphocytes 24 24 - 44 %    Monocytes 4 1 - 7 %    Eosinophils % 0 (L) 1 - 4 %    Basophils 0 0 - 2 %    nRBC 6 (H) 0 per 100 WBC    Absolute Immature Granulocyte 0.11 0.00 - 0.30 k/uL    Segs Absolute 7.89 (H) 1.8 - 7.7 k/uL    Absolute Lymph # 2.66 1.0 - 4.8 k/uL    Absolute Mono # 0.44 0.1 - 0.8 k/uL    Absolute Eos # 0.00 0.0 - 0.4 k/uL    Basophils Absolute 0.00 0.0 - 0.2 k/uL    Morphology ANISOCYTOSIS PRESENT     Morphology 1+ POLYCHROMASIA    Basic Metabolic Panel w/ Reflex to MG   Result Value Ref Range    Glucose 85 70 - 99 mg/dL    BUN 5 (L) 6 - 20 mg/dL    CREATININE 0.58 (L) 0.70 - 1.20 mg/dL    Bun/Cre Ratio NOT REPORTED 9 - 20    Calcium 8.4 (L) 8.6 - 10.4 mg/dL    Sodium 139 135 - 144 mmol/L    Potassium 4.3 3.7 - 5.3 mmol/L    Chloride 103 98 - 107 mmol/L    CO2 27 20 - 31 mmol/L    Anion Gap 9 9 - 17 mmol/L    GFR Non-African American >60 >60 mL/min    GFR cortical thickness measures 1.7 cm. Limited visualization of the right kidney demonstrates no gross right-sided hydronephrosis. PANCREAS:  Visualized portions of the pancreas are unremarkable. OTHER: No evidence of right upper quadrant ascites. 1. Appearance of the liver can be seen with diffuse hepatocellular disease and/or fatty infiltration of the liver. 2. Prior cholecystectomy. Common bile duct normal in caliber measuring 4 mm. Xr Chest Portable    Result Date: 9/8/2019  EXAMINATION: ONE XRAY VIEW OF THE CHEST 9/8/2019 8:02 am COMPARISON: March 24, 2019 HISTORY: ORDERING SYSTEM PROVIDED HISTORY: chest pain TECHNOLOGIST PROVIDED HISTORY: chest pain Relevant Medical/Surgical History: chest pain nausea and vomiting FINDINGS: No focal consolidation. Minimal scarring at the right lung base is unchanged. No cardiomegaly. No pulmonary edema. No acute findings. No change. Ct Chest Pulmonary Embolism W Contrast    Result Date: 9/8/2019  EXAMINATION: CTA OF THE CHEST 9/8/2019 8:37 am TECHNIQUE: CTA of the chest was performed after the administration of intravenous contrast.  Multiplanar reformatted images are provided for review. MIP images are provided for review. Dose modulation, iterative reconstruction, and/or weight based adjustment of the mA/kV was utilized to reduce the radiation dose to as low as reasonably achievable. COMPARISON: CT chest from 03/24/2019 HISTORY: ORDERING SYSTEM PROVIDED HISTORY: PE study Patient is a 54-year-old male with possible pulmonary embolus. FINDINGS: Pulmonary Arteries: Evaluation of the pulmonary arterial vasculature is markedly limited due to suboptimal bolus timing and respiratory motion. No obvious filling defect identified in the main, right main, left main pulmonary arterial vasculature. Evaluation of the lobar pulmonary arterial vasculature and beyond is severely limited for the assessment of pulmonary embolus.  Mediastinum: Visualized thyroid gland smoking and other known risk factors. - High risk patients include individuals with a history or smoking or known risk factors. Radiology 2017 http://pubs. rsna.org/doi/full/10.1148/radiol. 7484589198       Primary Problem  <principal problem not specified>    Active Hospital Problems    Diagnosis Date Noted    Abnormal LFTs [R94.5]     Jaundice [R17]     Acute liver failure without hepatic coma [K72.00]     Sickle cell crisis ([address] [D57.00] 03/27/2019    Tobacco abuse [Z72.0] 10/24/2018       Hb SC disease  Anemia  Jaundice  Abdominal pain    RECOMMENDATIONS:  I personally reviewed results of lab work-up and the relevant clinical data and imaging studies with the patient  H&H stable  Check LFTs daily. 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 Continue supportive care. Given stable H&H do not suspect hemolytic crisis on hepatic sequestration  Continue supportive care with IV fluids and monitoring of liver function        Discussed with patient and Nurse. Thank you for asking us to see this patient. Deon Dockery MD          This note is created with the assistance of a speech recognition program.  While intending to generate a document that actually reflects the content of the visit, the document can still have some errors including those of syntax and sound a like substitutions which may escape proof reading. It such instances, actual meaning can be extrapolated by contextual diversion.

## 2019-09-11 NOTE — PROGRESS NOTES
CALCIUM  --  8.4* 8.4*       LFTS  Recent Labs     09/08/19  1621 09/09/19  0545 09/10/19  0614   ALKPHOS 89 78 76   ALT 2,645* 1,841* 1,100*   AST >7,000* 2,666* 864*   BILITOT 7.15* 5.51* 4.88*   BILIDIR 0.62* 0.36* 0.53*   LABALBU 4.5 3.9 3.6       AMYLASE/LIPASE/AMMONIA  Recent Labs     09/09/19  0941   AMMONIA 22       Acute Hepatitis Panel   Lab Results   Component Value Date    HEPBSAG NONREACTIVE 09/08/2019    HEPCAB NONREACTIVE 09/08/2019    HEPBIGM NONREACTIVE 09/08/2019    HEPAIGM NONREACTIVE 09/08/2019       HCV Genotype   No results found for: HEPATITISCGENOTYPE    HCV Quantitative   No results found for: HCVQNT    LIVER WORK UP:    AFP  No results found for: AFP    Alpha 1 antitrypsin   Lab Results   Component Value Date    A1A 168 09/09/2019       Anti - Liver/Kidney Ab  No results found for: LIVER-KIDNEYMICROSOMALAB    SHIRA  Lab Results   Component Value Date    SHIRA NEGATIVE 09/09/2019       AMA  Lab Results   Component Value Date    MITOAB 3.5 09/09/2019       ASMA  Lab Results   Component Value Date    SMOOTHMUSCAB 1:40 09/09/2019       Ceruloplasmin  Lab Results   Component Value Date    CERULOPLSM 28 09/09/2019       Celiac panel  Lab Results   Component Value Date    TTGIGA 0.5 09/09/2019     09/09/2019       PT/INR  Recent Labs     09/09/19  1329   PROTIME 12.6*   INR 1.2       Cancer Markers:  CEA:  No results for input(s): CEA in the last 72 hours. Ca 125:  No results for input(s):  in the last 72 hours. Ca 19-9:   Invalid input(s):   AFP: No results for input(s): AFP in the last 72 hours.     Lactic acid:Invalid input(s): LACTIC ACID    Radiology Review:    Us Liver    Result Date: 9/9/2019  EXAMINATION: RIGHT UPPER QUADRANT ULTRASOUND 9/9/2019 8:17 am COMPARISON: CT abdomen pelvis from 09/08/2019 HISTORY: ORDERING SYSTEM PROVIDED HISTORY: ABNORMAL LIVER FUNCTION TESTS 15-year-old male with abnormal LFTs FINDINGS: LIVER:  Slight heterogeneous increased echogenicity Brett Griffith and   I have examined the patient myselft independently, and taken ros and hpi , including pertinent history and exam findings,  with the author of this note . I have reviewed the key elements of all parts of the encounter with the nurse practitioner/resident.     I agree with the assessment, plan and orders as documented by the above health care provider   Addition/summary:    Doing better  Enzymes are much better  Sickle cell hepatopathy  Patient can be followed outpatient from the GI standpoint  No evidence of biliary complication    Electronically signed by Eduardo Smith MD

## 2019-09-11 NOTE — VIRTUAL HEALTH
Transportation needs:     Medical: None     Non-medical: None   Tobacco Use    Smoking status: Current Some Day Smoker     Packs/day: 1.00     Years: 2.00     Pack years: 2.00     Types: Cigarettes    Smokeless tobacco: Never Used    Tobacco comment: zena q week   Substance and Sexual Activity    Alcohol use: No    Drug use: Not Currently     Frequency: 1.0 times per week     Comment: Gertrudis Gunter, 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    Sexual activity: Yes     Partners: Female     Birth control/protection: Condom   Lifestyle    Physical activity:     Days per week: None     Minutes per session: None    Stress: None   Relationships    Social connections:     Talks on phone: None     Gets together: None     Attends Christianity service: None     Active member of club or organization: None     Attends meetings of clubs or organizations: None     Relationship status: None    Intimate partner violence:     Fear of current or ex partner: None     Emotionally abused: None     Physically abused: None     Forced sexual activity: None   Other Topics Concern    None   Social History Narrative    None     Family Psychiatric History:  none    Family History:       Problem Relation Age of Onset    Sickle Cell Anemia Brother      Physical  /82   Pulse 63   Temp 98.9 °F (37.2 °C) (Oral)   Resp 18   Ht 6' (1.829 m)   Wt 185 lb 3 oz (84 kg)   SpO2 94%   BMI 25.12 kg/m²     MENTAL STATUS EXAM:  Level of consciousness:  within normal limits  Appearance:  ill-appearing, hospital attire and seated in bed  Behavior/Motor:  psychomotor retardation  Attitude toward examiner:  cooperative and poor eye contact  Speech:  slow and low productivity  Mood:  \"angry\"  Affect:  blunted  Thought processes:  linear and goal directed  Thought content:  No homicidal ideation   Suicidal Ideation:  denies suicidal ideation  Delusions:  no evidence of delusions  Perceptual Disturbance:  denies any perceptual disturbance  Cognition:  oriented to

## 2019-09-11 NOTE — CARE COORDINATION
Met with pt to discuss where he is going at discharge. He stated that he cannot go back to his mom but would not say why. Discussed where he would go at discharge and explained to him that SW could get him into a shelter. He stated that he would not go to a shelter. He stated that he was going to a friend's home. Asked pt for the address and SW could provide a cab and he stated that he was going to walk. Explained to pt that a cab could be provided for free and he stated that he was going to his aunt's first to  his stuff so he just wanted to walk. Encouraged pt to accept the cab but he still refused. Pt states that he is going to work on Friday and will get his schedule so he will call for his own appt for counseling.

## 2019-09-11 NOTE — PROGRESS NOTES
"Telephone Encounter by Willie Castanon at 09/13/18 11:10 AM     Author:  Willie Castanon Service:  (none) Author Type:       Filed:  09/13/18 11:11 AM Encounter Date:  9/13/2018 Status:  Signed     :  Willie Castanon ()              Ozzy Evanmay ESTEBAN    Patient Age: 52year old    ACCT STATUS:   MESSAGE:[SP1.1T]   Pt states is requesting to have an order placed to have her Vitamin D checked. Pt states she was placed on a new dosage of Vitamin D earlier this year and is requesting to see ""where she is at now\"".[SP1.1M] Message confirmed with caller. Next and Last Visit with Provider and Department  Next visit with Andrea Anderson. is on No match found  Next visit with [address] is on No match found  Last visit with Andrea Anderson. was on 04/26/2018 at 10:00 AM in [address] [place] [address] visit with [address] was on 06/15/2018 at  9:40 AM in [address]: As of 06/15/2018 weight is 233 lbs. (105.688 kg). Height is 5' 4\""(1.626 m). BMI is 39.97 kg/(m^2) calculated from:     Height 5' 4\"" (1.626 m) as of 6/15/18     Weight 233 lb (105.688 kg) as of 6/15/18      No Known Allergies  Current outpatient prescriptions       Medication  Sig Dispense Refill   â¢ ergocalciferol (ERGOCALCIFEROL) 16806 UNITS Cap Take 50,000 Units by mouth. â¢ alprazolam (XANAX) 0.25 MG tablet Take 1 Tab by mouth 3 (three) times daily as needed for Sleep. 20 Tab 0   â¢ fluoxetine (PROZAC) 10 MG Cap Take 1 Cap by mouth daily. 90 Cap 1   â¢ ALPRAZolam (XANAX) 0.5 MG tablet Take one hour prior to flying, may repeat after one hour if needed 5 Tab 0   â¢ Cholecalciferol (VITAMIN D) 2000 UNITS CAPS Take  by mouth. PHARMACY to use: Please request information from patient if needed.             Pharmacy preference(s) on file:    [address] [facility]    CALL BACK INFO:[SP1.1T] DO NOT LEAVE A MESSAGE - " Pt refused morning labs. Primary resident notified. Will continue to monitor. contact patient directly[SP1.1M]  ROUTING:[SP1.1T] Patient's physician/staff[SP1.1M]        PCP: Joshua Burnham. Nikhil Cramer MD         INS: Payor: Lindsay Diaz / Plan: N/A / Product Type: *No Product type* / Note: This is the primary coverage, but no account was found for this location or the patient's primary location.    ADDRESS:  [address] [place] [address][SP1.1T]         Revision History        User Key Date/Time User Provider Type Action    > SP1.1 09/13/18 11:11 AM José Miguel Haddad  Sign    M - Manual, T - Template

## 2019-09-12 ENCOUNTER — TELEPHONE (OUTPATIENT)
Dept: FAMILY MEDICINE CLINIC | Age: 24
End: 2019-09-12

## 2019-09-12 LAB — CANNABINOID, BLOOD: 136 NG/ML

## 2019-09-14 LAB
CULTURE: NORMAL
CULTURE: NORMAL
Lab: NORMAL
Lab: NORMAL
SPECIMEN DESCRIPTION: NORMAL
SPECIMEN DESCRIPTION: NORMAL

## 2019-09-15 LAB
EKG ATRIAL RATE: 80 BPM
EKG P AXIS: 79 DEGREES
EKG P-R INTERVAL: 152 MS
EKG Q-T INTERVAL: 368 MS
EKG QRS DURATION: 86 MS
EKG QTC CALCULATION (BAZETT): 424 MS
EKG R AXIS: 50 DEGREES
EKG T AXIS: 64 DEGREES
EKG VENTRICULAR RATE: 80 BPM

## 2019-09-16 LAB — COCAINE BLOOD: 144 NG/ML

## 2019-09-18 ENCOUNTER — OFFICE VISIT (OUTPATIENT)
Dept: FAMILY MEDICINE CLINIC | Age: 24
End: 2019-09-18
Payer: MEDICAID

## 2019-09-18 ENCOUNTER — HOSPITAL ENCOUNTER (EMERGENCY)
Age: 24
Discharge: HOME | End: 2019-09-18
Attending: EMERGENCY MEDICINE
Payer: MEDICAID

## 2019-09-18 VITALS
RESPIRATION RATE: 16 BRPM | HEART RATE: 77 BPM | BODY MASS INDEX: 25.18 KG/M2 | DIASTOLIC BLOOD PRESSURE: 81 MMHG | OXYGEN SATURATION: 98 % | SYSTOLIC BLOOD PRESSURE: 134 MMHG | TEMPERATURE: 99 F | HEIGHT: 73 IN | WEIGHT: 190 LBS

## 2019-09-18 VITALS
SYSTOLIC BLOOD PRESSURE: 117 MMHG | HEIGHT: 73 IN | HEART RATE: 70 BPM | DIASTOLIC BLOOD PRESSURE: 71 MMHG | BODY MASS INDEX: 25.31 KG/M2 | WEIGHT: 191 LBS

## 2019-09-18 DIAGNOSIS — K72.00 ACUTE LIVER FAILURE WITHOUT HEPATIC COMA: ICD-10-CM

## 2019-09-18 DIAGNOSIS — M79.604 PAIN IN BOTH LOWER EXTREMITIES: Primary | ICD-10-CM

## 2019-09-18 DIAGNOSIS — D57.00 SICKLE CELL PAIN CRISIS (HCC): ICD-10-CM

## 2019-09-18 DIAGNOSIS — D57.00 SICKLE CELL CRISIS (HCC): Primary | ICD-10-CM

## 2019-09-18 DIAGNOSIS — M79.605 PAIN IN BOTH LOWER EXTREMITIES: Primary | ICD-10-CM

## 2019-09-18 LAB
ABSOLUTE EOS #: 0 K/UL (ref 0–0.4)
ABSOLUTE IMMATURE GRANULOCYTE: 0 K/UL (ref 0–0.3)
ABSOLUTE LYMPH #: 2.51 K/UL (ref 1–4.8)
ABSOLUTE MONO #: 1.13 K/UL (ref 0.1–0.8)
ABSOLUTE RETIC #: 0.16 M/UL (ref 0.03–0.08)
ALBUMIN SERPL-MCNC: 3.7 G/DL (ref 3.5–5.2)
ALBUMIN/GLOBULIN RATIO: 1.4 (ref 1–2.5)
ALP BLD-CCNC: 78 U/L (ref 40–129)
ALT SERPL-CCNC: 109 U/L (ref 5–41)
ANION GAP SERPL CALCULATED.3IONS-SCNC: 10 MMOL/L (ref 9–17)
AST SERPL-CCNC: 36 U/L
BASOPHILS # BLD: 0 % (ref 0–2)
BASOPHILS ABSOLUTE: 0 K/UL (ref 0–0.2)
BILIRUB SERPL-MCNC: 2.61 MG/DL (ref 0.3–1.2)
BUN BLDV-MCNC: 7 MG/DL (ref 6–20)
BUN/CREAT BLD: ABNORMAL (ref 9–20)
CALCIUM SERPL-MCNC: 8.6 MG/DL (ref 8.6–10.4)
CHLORIDE BLD-SCNC: 105 MMOL/L (ref 98–107)
CO2: 26 MMOL/L (ref 20–31)
CREAT SERPL-MCNC: 0.68 MG/DL (ref 0.7–1.2)
DIFFERENTIAL TYPE: ABNORMAL
EOSINOPHILS RELATIVE PERCENT: 0 % (ref 1–4)
GFR AFRICAN AMERICAN: >60 ML/MIN
GFR NON-AFRICAN AMERICAN: >60 ML/MIN
GFR SERPL CREATININE-BSD FRML MDRD: ABNORMAL ML/MIN/{1.73_M2}
GFR SERPL CREATININE-BSD FRML MDRD: ABNORMAL ML/MIN/{1.73_M2}
GLUCOSE BLD-MCNC: 91 MG/DL (ref 70–99)
HCT VFR BLD CALC: 28.8 % (ref 40.7–50.3)
HEMOGLOBIN: 10 G/DL (ref 13–17)
IMMATURE GRANULOCYTES: 0 %
IMMATURE RETIC FRACT: 24.5 % (ref 2.7–18.3)
LACTATE DEHYDROGENASE: 318 U/L (ref 135–225)
LYMPHOCYTES # BLD: 31 % (ref 24–44)
MAGNESIUM: 1.5 MG/DL (ref 1.6–2.6)
MCH RBC QN AUTO: 30.3 PG (ref 25.2–33.5)
MCHC RBC AUTO-ENTMCNC: 34.7 G/DL (ref 28.4–34.8)
MCV RBC AUTO: 87.3 FL (ref 82.6–102.9)
MONOCYTES # BLD: 14 % (ref 1–7)
MORPHOLOGY: ABNORMAL
MORPHOLOGY: ABNORMAL
NRBC AUTOMATED: 2 PER 100 WBC
PDW BLD-RTO: 14.7 % (ref 11.8–14.4)
PLATELET # BLD: 276 K/UL (ref 138–453)
PLATELET ESTIMATE: ABNORMAL
PMV BLD AUTO: 13.2 FL (ref 8.1–13.5)
POTASSIUM SERPL-SCNC: 3.6 MMOL/L (ref 3.7–5.3)
RBC # BLD: 3.3 M/UL (ref 4.21–5.77)
RBC # BLD: ABNORMAL 10*6/UL
RETIC %: 4.8 % (ref 0.5–1.9)
RETIC HEMOGLOBIN: 32.3 PG (ref 28.2–35.7)
SEG NEUTROPHILS: 55 % (ref 36–66)
SEGMENTED NEUTROPHILS ABSOLUTE COUNT: 4.46 K/UL (ref 1.8–7.7)
SODIUM BLD-SCNC: 141 MMOL/L (ref 135–144)
TOTAL PROTEIN: 6.4 G/DL (ref 6.4–8.3)
WBC # BLD: 8.1 K/UL (ref 3.5–11.3)
WBC # BLD: ABNORMAL 10*3/UL

## 2019-09-18 PROCEDURE — 2580000003 HC RX 258: Performed by: STUDENT IN AN ORGANIZED HEALTH CARE EDUCATION/TRAINING PROGRAM

## 2019-09-18 PROCEDURE — 96365 THER/PROPH/DIAG IV INF INIT: CPT

## 2019-09-18 PROCEDURE — 80053 COMPREHEN METABOLIC PANEL: CPT

## 2019-09-18 PROCEDURE — 1111F DSCHRG MED/CURRENT MED MERGE: CPT | Performed by: STUDENT IN AN ORGANIZED HEALTH CARE EDUCATION/TRAINING PROGRAM

## 2019-09-18 PROCEDURE — 83615 LACTATE (LD) (LDH) ENZYME: CPT

## 2019-09-18 PROCEDURE — 83735 ASSAY OF MAGNESIUM: CPT

## 2019-09-18 PROCEDURE — 99284 EMERGENCY DEPT VISIT MOD MDM: CPT

## 2019-09-18 PROCEDURE — 6360000002 HC RX W HCPCS: Performed by: STUDENT IN AN ORGANIZED HEALTH CARE EDUCATION/TRAINING PROGRAM

## 2019-09-18 PROCEDURE — 85025 COMPLETE CBC W/AUTO DIFF WBC: CPT

## 2019-09-18 PROCEDURE — 85045 AUTOMATED RETICULOCYTE COUNT: CPT

## 2019-09-18 PROCEDURE — 99215 OFFICE O/P EST HI 40 MIN: CPT | Performed by: STUDENT IN AN ORGANIZED HEALTH CARE EDUCATION/TRAINING PROGRAM

## 2019-09-18 PROCEDURE — 96375 TX/PRO/DX INJ NEW DRUG ADDON: CPT

## 2019-09-18 RX ORDER — 0.9 % SODIUM CHLORIDE 0.9 %
1000 INTRAVENOUS SOLUTION INTRAVENOUS ONCE
Status: COMPLETED | OUTPATIENT
Start: 2019-09-18 | End: 2019-09-18

## 2019-09-18 RX ORDER — KETOROLAC TROMETHAMINE 15 MG/ML
15 INJECTION, SOLUTION INTRAMUSCULAR; INTRAVENOUS ONCE
Status: COMPLETED | OUTPATIENT
Start: 2019-09-18 | End: 2019-09-18

## 2019-09-18 RX ORDER — MAGNESIUM SULFATE 1 G/100ML
1 INJECTION INTRAVENOUS ONCE
Status: COMPLETED | OUTPATIENT
Start: 2019-09-18 | End: 2019-09-18

## 2019-09-18 RX ORDER — MORPHINE SULFATE 4 MG/ML
4 INJECTION, SOLUTION INTRAMUSCULAR; INTRAVENOUS ONCE
Status: COMPLETED | OUTPATIENT
Start: 2019-09-18 | End: 2019-09-18

## 2019-09-18 RX ADMIN — MORPHINE SULFATE 4 MG: 4 INJECTION INTRAVENOUS at 03:43

## 2019-09-18 RX ADMIN — SODIUM CHLORIDE 1000 ML: 9 INJECTION, SOLUTION INTRAVENOUS at 02:44

## 2019-09-18 RX ADMIN — KETOROLAC TROMETHAMINE 15 MG: 15 INJECTION, SOLUTION INTRAMUSCULAR; INTRAVENOUS at 02:44

## 2019-09-18 RX ADMIN — MAGNESIUM SULFATE HEPTAHYDRATE 1 G: 1 INJECTION, SOLUTION INTRAVENOUS at 03:33

## 2019-09-18 ASSESSMENT — PAIN DESCRIPTION - LOCATION: LOCATION: LEG

## 2019-09-18 ASSESSMENT — ENCOUNTER SYMPTOMS
COUGH: 0
SHORTNESS OF BREATH: 0
EYE PAIN: 0
WHEEZING: 0
RHINORRHEA: 0
ABDOMINAL PAIN: 0
CHOKING: 0
BACK PAIN: 0
NAUSEA: 0
ABDOMINAL DISTENTION: 0
COLOR CHANGE: 0
CONSTIPATION: 0
COUGH: 0
ABDOMINAL PAIN: 0
NAUSEA: 0
SORE THROAT: 0
VOMITING: 0
SHORTNESS OF BREATH: 0
TROUBLE SWALLOWING: 0
VOMITING: 0

## 2019-09-18 ASSESSMENT — PAIN DESCRIPTION - DESCRIPTORS: DESCRIPTORS: ACHING;THROBBING;SHARP;SHOOTING

## 2019-09-18 ASSESSMENT — PAIN DESCRIPTION - ORIENTATION: ORIENTATION: RIGHT;LEFT

## 2019-09-18 ASSESSMENT — PAIN SCALES - GENERAL: PAINLEVEL_OUTOF10: 10

## 2019-09-18 ASSESSMENT — PAIN DESCRIPTION - PAIN TYPE: TYPE: ACUTE PAIN

## 2019-09-18 NOTE — LETTER
[facility]  [facility] [address]  Phone: [phone]  Fax: [phone]    Darin Castillo MD        September 18, 2019    Patient: Macarena Cruz   YOB: 1995   Date of Visit: 9/18/2019       To Whom It May Concern:    Patient was seen in our office on 09/18/2019. Patient may return back to work on 09/19/2019.     Sincerely,        Darin Castillo MD

## 2019-09-18 NOTE — ED PROVIDER NOTES
[facility]     Emergency Department     Faculty Attestation    I performed a history and physical examination of the patient and discussed management with the resident. I have reviewed and agree with the residents findings including all diagnostic interpretations, and treatment plans as written. Any areas of disagreement are noted on the chart. I was personally present for the key portions of any procedures. I have documented in the chart those procedures where I was not present during the key portions. I have reviewed the emergency nurses triage note. I agree with the chief complaint, past medical history, past surgical history, allergies, medications, social and family history as documented unless otherwise noted below. Documentation of the HPI, Physical Exam and Medical Decision Making performed by scribsaleem is based on my personal performance of the HPI, PE and MDM. For Physician Assistant/ Nurse Practitioner cases/documentation I have personally evaluated this patient and have completed at least one if not all key elements of the E/M (history, physical exam, and MDM). Additional findings are as noted. 26 yo M leg pain, recently discharged by family medicine, no fever , no vomit, no injury, no sob,   pe vss paulina no cervical tenderness crepitus or deformity, abdomen non tender, no mass no distension, no rigidity, no calf swelling, no calf tenderness    --wbc, hgb, retic ct stable, s/s Improved, pt discharged,     Pre-hypertension/Hypertension: The patient has been informed that they may have pre-hypertension or Hypertension based on a blood pressure reading in the emergency department. I recommend that the patient call the primary care provider listed on their discharge instructions or a physician of their choice this week to arrange follow up for further evaluation of possible pre-hypertension or Hypertension.       EKG Interpretation    Interpreted by
mouth as needed for Smoking cessation 9/11/19   Crissy Gordon MD   oxyCODONE-acetaminophen (PERCOCET) 5-325 MG per tablet Take 1 tablet by mouth 4 times daily for 7 days. Intended supply: 5 days. Take lowest dose possible to manage pain 9/11/19 9/18/19  Crissy Gordon MD       REVIEW OF SYSTEMS    (2-9 systems for level 4, 10 or more for level 5)      Review of Systems   Constitutional: Negative for activity change, appetite change, diaphoresis, fatigue and unexpected weight change. HENT: Negative for ear pain, rhinorrhea, sore throat and trouble swallowing. Eyes: Negative for pain and visual disturbance. Respiratory: Negative for cough, choking, shortness of breath and wheezing. Cardiovascular: Negative for chest pain, palpitations and leg swelling. Gastrointestinal: Negative for abdominal distention, abdominal pain, constipation, nausea and vomiting. Musculoskeletal: Positive for arthralgias, gait problem and myalgias. Negative for back pain, joint swelling, neck pain and neck stiffness. Skin: Negative for color change and rash. Neurological: Negative for dizziness, weakness, light-headedness, numbness and headaches. PHYSICAL EXAM   (up to 7 for level 4, 8 or more for level 5)      INITIAL VITALS:   /81   Pulse 77   Temp 99 °F (37.2 °C) (Oral)   Resp 16   Ht 6' 1\" (1.854 m)   Wt 190 lb (86.2 kg)   SpO2 98%   BMI 25.07 kg/m²     Physical Exam   Constitutional: He is oriented to person, place, and time. He appears well-developed and well-nourished. No distress. HENT:   Head: Normocephalic and atraumatic. Eyes: Pupils are equal, round, and reactive to light. Conjunctivae and EOM are normal. Right eye exhibits no discharge. Left eye exhibits no discharge. Neck: No tracheal deviation present. Cardiovascular: Normal rate, regular rhythm and normal heart sounds. Exam reveals no friction rub. No murmur heard. Pulmonary/Chest: No stridor. No respiratory distress.  He has no

## 2019-09-18 NOTE — PROGRESS NOTES
for the 9/18/19 encounter (Office Visit) with Twila Rodriguez MD.        Medications patient taking as of now reconciled against medications ordered at time of hospital discharge: Yes    Chief Complaint   Patient presents with    Follow-Up from [address] [place] [address] [address] , pain in lower extremities        HPI    Inpatient course: Discharge summary reviewed- see chart. Interval history/Current status: Patient was admitted to liver failure secondary to cocaine use and sickle cell crisis. Patient states he is feeling better. Denies abdominal pain. LFTS have improved. He denies any use of cocaine since admission. No concerns today. Patient does not follow up with Hem/Onc or pain management     Review of Systems   Constitutional: Negative for activity change, fatigue and fever. Respiratory: Negative for cough and shortness of breath. Cardiovascular: Negative for chest pain. Gastrointestinal: Negative for abdominal pain, nausea and vomiting. Musculoskeletal: Positive for arthralgias. Vitals:    09/18/19 1446   BP: 117/71   Site: Left Upper Arm   Position: Sitting   Cuff Size: Medium Adult   Pulse: 70   Weight: 191 lb (86.6 kg)   Height: 6' 1\" (1.854 m)     Body mass index is 25.2 kg/m². Wt Readings from Last 3 Encounters:   09/18/19 191 lb (86.6 kg)   09/18/19 190 lb (86.2 kg)   09/10/19 185 lb 3 oz (84 kg)     BP Readings from Last 3 Encounters:   09/18/19 117/71   09/18/19 134/81   09/11/19 125/88       Physical Exam   Constitutional: He is oriented to person, place, and time. He appears well-developed and well-nourished. No distress. Cardiovascular: Normal rate and regular rhythm. Pulmonary/Chest: Effort normal and breath sounds normal. No respiratory distress. Abdominal: Soft. Bowel sounds are normal. He exhibits no distension. There is no tenderness. Musculoskeletal: He exhibits no edema. Neurological: He is alert and oriented to person, place, and time. Skin: Skin is warm and dry. Assessment/Plan:  1. Sickle cell crisis (HCC)  - Fang Syed MD, Hematology/Oncology, [place]  - Pain management referral      2.  Acute liver failure without hepatic coma  - Improving  - Discussed with patient cocaine cessation         Medical Decision Making: low complexity

## 2019-09-19 ENCOUNTER — TELEPHONE (OUTPATIENT)
Dept: ONCOLOGY | Age: 24
End: 2019-09-19

## 2019-11-08 ENCOUNTER — HOSPITAL ENCOUNTER (EMERGENCY)
Age: 24
Discharge: HOME | End: 2019-11-09
Attending: EMERGENCY MEDICINE
Payer: MEDICAID

## 2019-11-08 DIAGNOSIS — F14.90 COCAINE USE: Primary | ICD-10-CM

## 2019-11-08 PROCEDURE — 99284 EMERGENCY DEPT VISIT MOD MDM: CPT

## 2019-11-12 ASSESSMENT — ENCOUNTER SYMPTOMS
CONSTIPATION: 0
ABDOMINAL PAIN: 0
DIARRHEA: 0
VOMITING: 0
NAUSEA: 0
SHORTNESS OF BREATH: 0

## 2020-01-28 ENCOUNTER — HOSPITAL ENCOUNTER (OUTPATIENT)
Age: 25
Setting detail: OBSERVATION
LOS: 1 days | Discharge: HOME | End: 2020-01-28
Attending: EMERGENCY MEDICINE | Admitting: FAMILY MEDICINE
Payer: MEDICAID

## 2020-01-28 ENCOUNTER — APPOINTMENT (OUTPATIENT)
Dept: CT IMAGING | Age: 25
End: 2020-01-28
Payer: MEDICAID

## 2020-01-28 ENCOUNTER — APPOINTMENT (OUTPATIENT)
Dept: GENERAL RADIOLOGY | Age: 25
End: 2020-01-28
Payer: MEDICAID

## 2020-01-28 VITALS
BODY MASS INDEX: 25.18 KG/M2 | RESPIRATION RATE: 15 BRPM | HEIGHT: 73 IN | HEART RATE: 65 BPM | WEIGHT: 190 LBS | SYSTOLIC BLOOD PRESSURE: 119 MMHG | TEMPERATURE: 97.2 F | DIASTOLIC BLOOD PRESSURE: 67 MMHG | OXYGEN SATURATION: 95 %

## 2020-01-28 PROBLEM — E87.6 HYPOKALEMIA: Status: ACTIVE | Noted: 2020-01-28

## 2020-01-28 PROBLEM — T50.901A DRUG OVERDOSE, ACCIDENTAL OR UNINTENTIONAL, INITIAL ENCOUNTER: Status: ACTIVE | Noted: 2020-01-28

## 2020-01-28 LAB
-: NORMAL
ABSOLUTE EOS #: 0 K/UL (ref 0–0.4)
ABSOLUTE IMMATURE GRANULOCYTE: 0.16 K/UL (ref 0–0.3)
ABSOLUTE LYMPH #: 1.44 K/UL (ref 1–4.8)
ABSOLUTE MONO #: 0.96 K/UL (ref 0.1–0.8)
ACETAMINOPHEN LEVEL: <5 UG/ML (ref 10–30)
ALBUMIN SERPL-MCNC: 3.3 G/DL (ref 3.5–5.2)
ALBUMIN/GLOBULIN RATIO: 1.7 (ref 1–2.5)
ALP BLD-CCNC: 43 U/L (ref 40–129)
ALT SERPL-CCNC: 9 U/L (ref 5–41)
AMPHETAMINE SCREEN URINE: NEGATIVE
ANION GAP SERPL CALCULATED.3IONS-SCNC: 10 MMOL/L (ref 9–17)
AST SERPL-CCNC: 32 U/L
BARBITURATE SCREEN URINE: NEGATIVE
BASOPHILS # BLD: 0 % (ref 0–2)
BASOPHILS ABSOLUTE: 0 K/UL (ref 0–0.2)
BENZODIAZEPINE SCREEN, URINE: NEGATIVE
BILIRUB SERPL-MCNC: 2.62 MG/DL (ref 0.3–1.2)
BUN BLDV-MCNC: 5 MG/DL (ref 6–20)
BUN/CREAT BLD: ABNORMAL (ref 9–20)
BUPRENORPHINE URINE: ABNORMAL
CALCIUM SERPL-MCNC: 7.2 MG/DL (ref 8.6–10.4)
CANNABINOID SCREEN URINE: POSITIVE
CHLORIDE BLD-SCNC: 111 MMOL/L (ref 98–107)
CO2: 20 MMOL/L (ref 20–31)
COCAINE METABOLITE, URINE: NEGATIVE
CREAT SERPL-MCNC: 0.46 MG/DL (ref 0.7–1.2)
DIFFERENTIAL TYPE: ABNORMAL
EOSINOPHILS RELATIVE PERCENT: 0 % (ref 1–4)
ETHANOL PERCENT: <0.01 %
ETHANOL: <10 MG/DL
GFR AFRICAN AMERICAN: >60 ML/MIN
GFR NON-AFRICAN AMERICAN: >60 ML/MIN
GFR SERPL CREATININE-BSD FRML MDRD: ABNORMAL ML/MIN/{1.73_M2}
GFR SERPL CREATININE-BSD FRML MDRD: ABNORMAL ML/MIN/{1.73_M2}
GLUCOSE BLD-MCNC: 145 MG/DL (ref 75–110)
GLUCOSE BLD-MCNC: 81 MG/DL (ref 70–99)
HCT VFR BLD CALC: 33.7 % (ref 40.7–50.3)
HEMOGLOBIN: 12.2 G/DL (ref 13–17)
IMMATURE GRANULOCYTES: 1 %
LYMPHOCYTES # BLD: 9 % (ref 24–44)
MCH RBC QN AUTO: 31 PG (ref 25.2–33.5)
MCHC RBC AUTO-ENTMCNC: 36.2 G/DL (ref 28.4–34.8)
MCV RBC AUTO: 85.5 FL (ref 82.6–102.9)
MDMA URINE: ABNORMAL
METHADONE SCREEN, URINE: NEGATIVE
METHAMPHETAMINE, URINE: ABNORMAL
MONOCYTES # BLD: 6 % (ref 1–7)
MORPHOLOGY: ABNORMAL
MORPHOLOGY: ABNORMAL
NRBC AUTOMATED: 0.5 PER 100 WBC
NUCLEATED RED BLOOD CELLS: 1 PER 100 WBC
OPIATES, URINE: NEGATIVE
OXYCODONE SCREEN URINE: NEGATIVE
PDW BLD-RTO: 14.3 % (ref 11.8–14.4)
PHENCYCLIDINE, URINE: NEGATIVE
PLATELET # BLD: 222 K/UL (ref 138–453)
PLATELET ESTIMATE: ABNORMAL
PMV BLD AUTO: 12.5 FL (ref 8.1–13.5)
POTASSIUM SERPL-SCNC: 3.1 MMOL/L (ref 3.7–5.3)
PROPOXYPHENE, URINE: ABNORMAL
RBC # BLD: 3.94 M/UL (ref 4.21–5.77)
RBC # BLD: ABNORMAL 10*6/UL
REASON FOR REJECTION: NORMAL
SALICYLATE LEVEL: <1 MG/DL (ref 3–10)
SEG NEUTROPHILS: 84 % (ref 36–66)
SEGMENTED NEUTROPHILS ABSOLUTE COUNT: 13.44 K/UL (ref 1.8–7.7)
SODIUM BLD-SCNC: 141 MMOL/L (ref 135–144)
TEST INFORMATION: ABNORMAL
TOTAL PROTEIN: 5.2 G/DL (ref 6.4–8.3)
TOXIC TRICYCLIC SC,BLOOD: NEGATIVE
TRICYCLIC ANTIDEPRESSANTS, UR: ABNORMAL
WBC # BLD: 16 K/UL (ref 3.5–11.3)
WBC # BLD: ABNORMAL 10*3/UL
ZZ NTE CLEAN UP: ORDERED TEST: NORMAL
ZZ NTE WITH NAME CLEAN UP: SPECIMEN SOURCE: NORMAL

## 2020-01-28 PROCEDURE — 2580000003 HC RX 258: Performed by: NURSE PRACTITIONER

## 2020-01-28 PROCEDURE — 85025 COMPLETE CBC W/AUTO DIFF WBC: CPT

## 2020-01-28 PROCEDURE — G0378 HOSPITAL OBSERVATION PER HR: HCPCS

## 2020-01-28 PROCEDURE — 96374 THER/PROPH/DIAG INJ IV PUSH: CPT

## 2020-01-28 PROCEDURE — 6370000000 HC RX 637 (ALT 250 FOR IP): Performed by: STUDENT IN AN ORGANIZED HEALTH CARE EDUCATION/TRAINING PROGRAM

## 2020-01-28 PROCEDURE — 82947 ASSAY GLUCOSE BLOOD QUANT: CPT

## 2020-01-28 PROCEDURE — 80307 DRUG TEST PRSMV CHEM ANLYZR: CPT

## 2020-01-28 PROCEDURE — 6360000002 HC RX W HCPCS: Performed by: STUDENT IN AN ORGANIZED HEALTH CARE EDUCATION/TRAINING PROGRAM

## 2020-01-28 PROCEDURE — 80053 COMPREHEN METABOLIC PANEL: CPT

## 2020-01-28 PROCEDURE — 99285 EMERGENCY DEPT VISIT HI MDM: CPT

## 2020-01-28 PROCEDURE — 6360000002 HC RX W HCPCS: Performed by: EMERGENCY MEDICINE

## 2020-01-28 PROCEDURE — 2580000003 HC RX 258: Performed by: EMERGENCY MEDICINE

## 2020-01-28 PROCEDURE — G0480 DRUG TEST DEF 1-7 CLASSES: HCPCS

## 2020-01-28 PROCEDURE — 70450 CT HEAD/BRAIN W/O DYE: CPT

## 2020-01-28 PROCEDURE — 99217 PR OBSERVATION CARE DISCHARGE MANAGEMENT: CPT | Performed by: FAMILY MEDICINE

## 2020-01-28 PROCEDURE — 71045 X-RAY EXAM CHEST 1 VIEW: CPT

## 2020-01-28 PROCEDURE — 2580000003 HC RX 258: Performed by: STUDENT IN AN ORGANIZED HEALTH CARE EDUCATION/TRAINING PROGRAM

## 2020-01-28 PROCEDURE — 96372 THER/PROPH/DIAG INJ SC/IM: CPT

## 2020-01-28 PROCEDURE — 93005 ELECTROCARDIOGRAM TRACING: CPT | Performed by: EMERGENCY MEDICINE

## 2020-01-28 RX ORDER — ONDANSETRON 4 MG/1
4 TABLET, FILM COATED ORAL EVERY 8 HOURS PRN
Qty: 21 TABLET | Refills: 0 | Status: SHIPPED | OUTPATIENT
Start: 2020-01-28 | End: 2020-02-04

## 2020-01-28 RX ORDER — SODIUM CHLORIDE 0.9 % (FLUSH) 0.9 %
10 SYRINGE (ML) INJECTION EVERY 12 HOURS SCHEDULED
Status: DISCONTINUED | OUTPATIENT
Start: 2020-01-28 | End: 2020-01-28 | Stop reason: HOSPADM

## 2020-01-28 RX ORDER — ONDANSETRON 2 MG/ML
4 INJECTION INTRAMUSCULAR; INTRAVENOUS EVERY 6 HOURS PRN
Status: DISCONTINUED | OUTPATIENT
Start: 2020-01-28 | End: 2020-01-28 | Stop reason: HOSPADM

## 2020-01-28 RX ORDER — ONDANSETRON 2 MG/ML
4 INJECTION INTRAMUSCULAR; INTRAVENOUS EVERY 6 HOURS PRN
Status: CANCELLED | OUTPATIENT
Start: 2020-01-28

## 2020-01-28 RX ORDER — SODIUM CHLORIDE 0.9 % (FLUSH) 0.9 %
10 SYRINGE (ML) INJECTION PRN
Status: DISCONTINUED | OUTPATIENT
Start: 2020-01-28 | End: 2020-01-28 | Stop reason: HOSPADM

## 2020-01-28 RX ORDER — NICOTINE 21 MG/24HR
1 PATCH, TRANSDERMAL 24 HOURS TRANSDERMAL DAILY PRN
Status: DISCONTINUED | OUTPATIENT
Start: 2020-01-28 | End: 2020-01-28 | Stop reason: HOSPADM

## 2020-01-28 RX ORDER — POTASSIUM CHLORIDE 20 MEQ/1
40 TABLET, EXTENDED RELEASE ORAL PRN
Status: DISCONTINUED | OUTPATIENT
Start: 2020-01-28 | End: 2020-01-28 | Stop reason: HOSPADM

## 2020-01-28 RX ORDER — ACETAMINOPHEN 325 MG/1
650 TABLET ORAL EVERY 4 HOURS PRN
Status: CANCELLED | OUTPATIENT
Start: 2020-01-28

## 2020-01-28 RX ORDER — SODIUM CHLORIDE 0.9 % (FLUSH) 0.9 %
10 SYRINGE (ML) INJECTION EVERY 12 HOURS SCHEDULED
Status: CANCELLED | OUTPATIENT
Start: 2020-01-28

## 2020-01-28 RX ORDER — 0.9 % SODIUM CHLORIDE 0.9 %
1000 INTRAVENOUS SOLUTION INTRAVENOUS ONCE
Status: COMPLETED | OUTPATIENT
Start: 2020-01-28 | End: 2020-01-28

## 2020-01-28 RX ORDER — ACETAMINOPHEN 325 MG/1
650 TABLET ORAL EVERY 4 HOURS PRN
Status: DISCONTINUED | OUTPATIENT
Start: 2020-01-28 | End: 2020-01-28 | Stop reason: HOSPADM

## 2020-01-28 RX ORDER — NALOXONE HYDROCHLORIDE 0.4 MG/ML
0.4 INJECTION, SOLUTION INTRAMUSCULAR; INTRAVENOUS; SUBCUTANEOUS ONCE
Status: COMPLETED | OUTPATIENT
Start: 2020-01-28 | End: 2020-01-28

## 2020-01-28 RX ORDER — SODIUM CHLORIDE 9 MG/ML
INJECTION, SOLUTION INTRAVENOUS CONTINUOUS
Status: DISCONTINUED | OUTPATIENT
Start: 2020-01-28 | End: 2020-01-28 | Stop reason: HOSPADM

## 2020-01-28 RX ORDER — POTASSIUM CHLORIDE 7.45 MG/ML
10 INJECTION INTRAVENOUS PRN
Status: DISCONTINUED | OUTPATIENT
Start: 2020-01-28 | End: 2020-01-28 | Stop reason: HOSPADM

## 2020-01-28 RX ADMIN — NALOXONE HYDROCHLORIDE 0.4 MG: 0.4 INJECTION, SOLUTION INTRAMUSCULAR; INTRAVENOUS; SUBCUTANEOUS at 03:00

## 2020-01-28 RX ADMIN — Medication 10 ML: at 09:09

## 2020-01-28 RX ADMIN — ACETAMINOPHEN 650 MG: 325 TABLET ORAL at 17:29

## 2020-01-28 RX ADMIN — ENOXAPARIN SODIUM 40 MG: 40 INJECTION SUBCUTANEOUS at 09:08

## 2020-01-28 RX ADMIN — SODIUM CHLORIDE: 9 INJECTION, SOLUTION INTRAVENOUS at 09:08

## 2020-01-28 RX ADMIN — SODIUM CHLORIDE 1000 ML: 9 INJECTION, SOLUTION INTRAVENOUS at 05:15

## 2020-01-28 ASSESSMENT — PAIN SCALES - GENERAL
PAINLEVEL_OUTOF10: 3
PAINLEVEL_OUTOF10: 0

## 2020-01-28 NOTE — ED PROVIDER NOTES
arrange follow up for further evaluation of possible pre-hypertension or Hypertension. EKG Interpretation    Interpreted by me  Normal sinus rhythm heart rate 87, frequent PVC, normal axis, no ST elevation T corrected 457    CRITICAL CARE: There was a high probability of clinically significant/life threatening deterioration in this patient's condition which required my urgent intervention. Total critical care time was 10 minutes. This excludes any time for separately reportable procedures.        [facility] 24, DO  01/28/20 [address], DO  01/28/20 73

## 2020-01-28 NOTE — PLAN OF CARE
Problem: Falls - Risk of:  Goal: Will remain free from falls  Description  Will remain free from falls  Outcome: Completed  Goal: Absence of physical injury  Description  Absence of physical injury  1/28/2020 1741 by Danial Delgado RN  Outcome: Completed  1/28/2020 0921 by Danial Delgado RN  Outcome: Ongoing  Note:   Pt remains free from accidental injury this shift, bed in lowest position, wheels locked, call light in reach, falling star posted outside of door. Will continue to monitor.

## 2020-01-28 NOTE — PROGRESS NOTES
Smoking Cessation - topics covered   []  Health Risks  []  Benefits of Quitting   []  Smoking Cessation  []  Patient has no history of tobacco use per note in significant history. []  Patient is former smoker per note in significant history. Patient quit in   []  No need for tobacco cessation education. []  Booklet given  []  Patient verbalizes understanding. []  Patient denies need for tobacco cessation education. []  Unable to meet with patient today. Will follow up as able. Admitting diagnosis precludes optimal reception of tobacco cessation education. Will defer at this time.   Abhijeet Flores  7:24 AM

## 2020-01-28 NOTE — PROGRESS NOTES
Patient was awake when I entered the room around 3:00pm. He was conversational, alert and oriented. He was able to ambulate without difficulty through the hallway. He did complain of some nausea, will be discharged with zofran.

## 2020-01-28 NOTE — ED PROVIDER NOTES
[facility]  ED  Emergency Department Encounter  EmergencyMedicine Resident     Pt Name:Brett Bush  MRN: [id number]  [place] 1995  Date of evaluation: 1/28/20  PCP:  Gama Mendez MD    CHIEF COMPLAINT       Chief Complaint   Patient presents with    Drug Overdose         HISTORY OF PRESENT ILLNESS  (Location/Symptom, Timing/Onset, Context/Setting, Quality, Duration,Modifying Factors, Severity.)      Macario Griffith is a 25 y.o. male who presents with drug overdose. Patient states he was smoking what he thought was marijuana but turned out to be K2. Tells me that then he had a feeling like he was dying shortly thereafter. States that he still feels intoxicated from smoking K2. Denies any other drug or intoxicant use this evening. Denies chest pain or shortness of breath says that he did have palpitations but those are resolved. No headache no nausea no trauma. Severity mild duration is constant context is K2 use. History limited as patient is altered. PAST MEDICAL / SURGICAL / SOCIAL / FAMILY HISTORY      has a past medical history of Acute liver failure without hepatic coma, Chest pain, Headache, History of blood transfusion, Moderate episode of recurrent major depressive disorder ([address] Sickle cell anemia ([address] Sickle cell disease, type SC ([address] Unspecified diseases of blood and blood-forming organs, and Vasculogenic erectile dysfunction. 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     Social History     Socioeconomic History    Marital status: Single     Spouse name: Not on file    Number of children: Not on file    Years of education: Not on file    Highest education level: Not on file   Occupational History    Not on file   Social Needs    Financial resource strain: Not on file    Food insecurity:     Worry: Not on file     Inability: Not on file    Transportation needs:     Medical: Not on file     Non-medical: Not on file   Tobacco Use    Smoking status: Current Some Day Smoker     Packs/day: 1.00     Years: 2.00     Pack years: 2.00     Types: Cigarettes    Smokeless tobacco: Never Used    Tobacco comment: zena q week   Substance and Sexual Activity    Alcohol use: No    Drug use: Yes     Frequency: 1.0 times per week     Comment: Tae Reddy, 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    Sexual activity: Yes     Partners: Female     Birth control/protection: Condom   Lifestyle    Physical activity:     Days per week: Not on file     Minutes per session: Not on file    Stress: Not on file   Relationships    Social connections:     Talks on phone: Not on file     Gets together: Not on file     Attends Alevism service: Not on file     Active member of club or organization: Not on file     Attends meetings of clubs or organizations: Not on file     Relationship status: Not on file    Intimate partner violence:     Fear of current or ex partner: Not on file     Emotionally abused: Not on file     Physically abused: Not on file     Forced sexual activity: Not on file   Other Topics Concern    Not on file   Social History Narrative    Not on file       Patient advised to stop smoking or to avoid tobacco use. Family History   Problem Relation Age of Onset    Sickle Cell Anemia Brother         Allergies:  Patient has no known allergies. HomeMedications:  Prior to Admission medications    Medication Sig Start Date End Date Taking?  Authorizing Provider   nicotine polacrilex (NICORETTE) 2 MG gum Take 1 each by mouth as needed for Smoking cessation  Patient not taking: Reported on 9/18/2019 9/11/19   Teddi Severance, MD       REVIEW OF SYSTEMS    (2-9 systems for level 4, 10 or more for level 5)      Review of Systems   Unable to perform ROS: Mental status change       PHYSICAL EXAM   (up to 7 for level 4, 8or more for level 5) INITIAL VITALS:   /67   Pulse 80   Temp 98.8 °F (37.1 °C) (Oral)   Resp 18   Wt 190 lb (86.2 kg)   SpO2 96%   BMI 25.07 kg/m²     Physical Exam  Constitutional:       General: He is not in acute distress. Appearance: Normal appearance. He is well-developed. HENT:      Head: Normocephalic and atraumatic. Right Ear: External ear normal.      Left Ear: External ear normal.      Nose: No nasal deformity or laceration. Eyes:      Conjunctiva/sclera: Conjunctivae normal.      Comments: Pupils 8 mm equal round reactive to light. No nystagmus. Neck:      Musculoskeletal: Normal range of motion. Vascular: No JVD. Trachea: No tracheal deviation. Comments: No cervical spine tenderness  Cardiovascular:      Rate and Rhythm: Normal rate and regular rhythm. Heart sounds: No murmur. Pulmonary:      Effort: Pulmonary effort is normal.      Breath sounds: Normal breath sounds. Abdominal:      Tenderness: There is no abdominal tenderness. Musculoskeletal: Normal range of motion. Comments: No obvious signs of trauma   Skin:     General: Skin is warm. Neurological:      Mental Status: He is oriented to person, place, and time. He is lethargic. GCS: GCS eye subscore is 3. GCS verbal subscore is 4. GCS motor subscore is 6. Cranial Nerves: Cranial nerves are intact. Sensory: Sensation is intact. Motor: Motor function is intact. Psychiatric:         Speech: Speech is slurred.          DIFFERENTIAL  DIAGNOSIS     PLAN (LABS / IMAGING / EKG):  Orders Placed This Encounter   Procedures    XR CHEST PORTABLE    CT HEAD WO CONTRAST    Comprehensive Metabolic Panel    TOX SCR, BLD, ED    SPECIMEN REJECTION    CBC Auto Differential    PREVIOUS SPECIMEN    Urine Drug Screen    Inpatient consult to Hospitalist    POCT Glucose    EKG 12 Lead       MEDICATIONS ORDERED:  Orders Placed This Encounter   Medications    naloxone (NARCAN) injection 0.4 mg    0.9 % sodium chloride bolus         SCORES  DIAGNOSTIC RESULTS / EMERGENCY DEPARTMENT COURSE / MDM     LABS:  Results for orders placed or performed during the hospital encounter of 01/28/20   Comprehensive Metabolic Panel   Result Value Ref Range    Glucose 81 70 - 99 mg/dL    BUN 5 (L) 6 - 20 mg/dL    CREATININE 0.46 (L) 0.70 - 1.20 mg/dL    Bun/Cre Ratio NOT REPORTED 9 - 20    Calcium 7.2 (L) 8.6 - 10.4 mg/dL    Sodium 141 135 - 144 mmol/L    Potassium 3.1 (L) 3.7 - 5.3 mmol/L    Chloride 111 (H) 98 - 107 mmol/L    CO2 20 20 - 31 mmol/L    Anion Gap 10 9 - 17 mmol/L    Alkaline Phosphatase 43 40 - 129 U/L    ALT 9 5 - 41 U/L    AST 32 <40 U/L    Total Bilirubin 2.62 (H) 0.3 - 1.2 mg/dL    Total Protein 5.2 (L) 6.4 - 8.3 g/dL    Alb 3.3 (L) 3.5 - 5.2 g/dL    Albumin/Globulin Ratio 1.7 1.0 - 2.5    GFR Non-African American >60 >60 mL/min    GFR African American >60 >60 mL/min    GFR Comment          GFR Staging NOT REPORTED    TOX SCR, BLD, ED   Result Value Ref Range    Ethanol <10 <10 mg/dL    Ethanol percent <3.444 <5.215 %    Salicylate Lvl <1 (L) 3 - 10 mg/dL    Acetaminophen Level <5 (L) 10 - 30 ug/mL    Toxic Tricyclic Sc,Blood NEGATIVE NEGATIVE   SPECIMEN REJECTION   Result Value Ref Range    Specimen Source . BLOOD     Ordered Test CDP     Reason for Rejection Unable to perform testing: Specimen clotted.      - NOT REPORTED    CBC Auto Differential   Result Value Ref Range    WBC 16.0 (H) 3.5 - 11.3 k/uL    RBC 3.94 (L) 4.21 - 5.77 m/uL    Hemoglobin 12.2 (L) 13.0 - 17.0 g/dL    Hematocrit 33.7 (L) 40.7 - 50.3 %    MCV 85.5 82.6 - 102.9 fL    MCH 31.0 25.2 - 33.5 pg    MCHC 36.2 (H) 28.4 - 34.8 g/dL    RDW 14.3 11.8 - 14.4 %    Platelets 586 064 - 183 k/uL    MPV 12.5 8.1 - 13.5 fL    NRBC Automated 0.5 (H) 0.0 per 100 WBC    Differential Type NOT REPORTED     WBC Morphology NOT REPORTED     RBC Morphology NOT REPORTED     Platelet Estimate NOT REPORTED     Immature Granulocytes 1 (H) 0 %    Seg Neutrophils 84 (H) 36 - 66 %    Lymphocytes 9 (L) 24 - 44 %    Monocytes 6 1 - 7 %    Eosinophils % 0 (L) 1 - 4 %    Basophils 0 0 - 2 %    nRBC 1 (H) 0 per 100 WBC    Absolute Immature Granulocyte 0.16 0.00 - 0.30 k/uL    Segs Absolute 13.44 (H) 1.8 - 7.7 k/uL    Absolute Lymph # 1.44 1.0 - 4.8 k/uL    Absolute Mono # 0.96 (H) 0.1 - 0.8 k/uL    Absolute Eos # 0.00 0.0 - 0.4 k/uL    Basophils Absolute 0.00 0.0 - 0.2 k/uL    Morphology ANISOCYTOSIS PRESENT     Morphology 1+ TARGET CELLS        IMPRESSION: This is a 60-year-old male presenting with presumed drug overdose. On exam is stable vital signs, nontoxic in appearance. Slightly altered. Pupils are 8 mm equal round reactive to light. We will plan for EKG fingerstick glucose and observation. Anticipate discharge. RADIOLOGY:  CT HEAD WO CONTRAST   Final Result   No acute intracranial abnormality. XR CHEST PORTABLE   Final Result   Unchanged right basilar parenchymal mild scarring. Otherwise, unremarkable single AP upright portable view of the chest.               EKG  EKG Interpretation    Interpreted by emergency department physician    Rhythm: normal sinus   Rate: normal  Axis: normal  Ectopy: frequent PVCs  Conduction: normal  ST Segments: no acute change  T Waves: no acute change  Q Waves: none    Clinical Impression: no acute changes and normal EKG    Kim Dent      All EKG's are interpreted by the [facility] Physician who either signs or Co-signs this chart in the absence of a cardiologist.    [address] [place] [address]:  Patient seen and evaluated by myself and attending. ED Course as of Jan 28 0556   Tue Jan 28, 2020   0307 Patient given Narcan with no response. Will order labs. [BW]   6148 Work-up unrevealing. Patient reevaluated and is still altered. Will admit to family med.     [BW]      ED Course User Index  [BW] Kim Dent,          PROCEDURES:  None    CONSULTS:  IP CONSULT TO HOSPITALIST      FINAL IMPRESSION 1. Altered mental status, unspecified altered mental status type    2. Drug overdose, undetermined intent, initial encounter          DISPOSITION / PLAN     DISPOSITION Decision To Admit 01/28/2020 05:44:19 AM      PATIENT REFERRED TO:  No follow-up provider specified.     DISCHARGE MEDICATIONS:  New Prescriptions    No medications on file       Cecille Christiansen DO  Emergency Medicine Resident    (Please note that portions of thisnote were completed with a voice recognition program.  Efforts were made to edit the dictations but occasionally words are mis-transcribed.)     Cecille Christiansen DO  01/28/20 0630

## 2020-01-28 NOTE — ED NOTES
0.4 mg narcan given IVP with no change noted to patient's mental status.       Mekhi Kaufman RN  01/28/20 1086
Bed: 22  Expected date: 1/28/20  Expected time: 2:11 AM  Means of arrival:   Comments:  Willie Hou X 600 Miguel Angel Osborne RN  01/28/20 0220
Norified by lab that the blood specimen sent for CDP was hemolyzed. Re-damian culture and sent to lab.       Esperanza Armstrong RN  01/28/20 9967
Patient returned from [address] with [address] tech.       Aaron Rai RN  01/28/20 7240
Patient to CT via stretcher with CT tech attending.       Ada Ortega RN  01/28/20 1210
Pt placed on 2L O2 NC for O2 sats in the high 80's ow [address]  [address] [place], RN  01/28/20 5925
with normal vital signs. He is responsive on exam and in no acute distress. ECG shows PVCs but is otherwise within normal limits. Patient is resting comfortably and was observed here in the ED for several hours. ***       PROCEDURES:  None    CONSULTS:  None    CRITICAL CARE:  None    FINAL IMPRESSION      No diagnosis found. DISPOSITION / PLAN     DISPOSITION        PATIENT REFERRED TO:  No follow-up provider specified.     DISCHARGE MEDICATIONS:  New Prescriptions    No medications on file

## 2020-01-28 NOTE — ED TRIAGE NOTES
Pt to ED with Ems after being called to the scene where pt \"felt he was dying. \" Upon arrival, pt told EMS he\" smoked some K2 that he thought was regular weed and then his heart began racing, he freaked out and destroyed his room and then began to vomit and panicked and called 911\" Upon arrival pt was placed on monitors and 2L O2 for slight desat and EKG was performed. Pt is A/)x4 but still speaking in partial sentences with slurred speech. Pt appears to be in no acute distress at this time. Call light placed at bedside.

## 2020-01-28 NOTE — ED PROVIDER NOTES
[facility] ED  Emergency Department  Emergency Medicine Resident Sign-out     Care of Malcom Crocker was assumed from Dr. Pepe Valera and is being seen for Drug Overdose  . The patient's initial evaluation and plan have been discussed with the prior provider who initially evaluated the patient.      EMERGENCY DEPARTMENT COURSE / MEDICAL DECISION MAKING:       MEDICATIONS GIVEN:  Orders Placed This Encounter   Medications    naloxone (NARCAN) injection 0.4 mg    0.9 % sodium chloride bolus       LABS / RADIOLOGY:     Labs Reviewed   COMPREHENSIVE METABOLIC PANEL - Abnormal; Notable for the following components:       Result Value    BUN 5 (*)     CREATININE 0.46 (*)     Calcium 7.2 (*)     Potassium 3.1 (*)     Chloride 111 (*)     Total Bilirubin 2.62 (*)     Total Protein 5.2 (*)     Alb 3.3 (*)     All other components within normal limits   TOX SCR, BLD, ED - Abnormal; Notable for the following components:    Salicylate Lvl <1 (*)     Acetaminophen Level <5 (*)     All other components within normal limits   CBC WITH AUTO DIFFERENTIAL - Abnormal; Notable for the following components:    WBC 16.0 (*)     RBC 3.94 (*)     Hemoglobin 12.2 (*)     Hematocrit 33.7 (*)     MCHC 36.2 (*)     NRBC Automated 0.5 (*)     Immature Granulocytes 1 (*)     Seg Neutrophils 84 (*)     Lymphocytes 9 (*)     Eosinophils % 0 (*)     nRBC 1 (*)     Segs Absolute 13.44 (*)     Absolute Mono # 0.96 (*)     All other components within normal limits   URINE DRUG SCREEN - Abnormal; Notable for the following components:    Cannabinoid Scrn, Ur POSITIVE (*)     All other components within normal limits   SPECIMEN REJECTION   PREVIOUS SPECIMEN   POCT GLUCOSE       Ct Head Wo Contrast    Result Date: 1/28/2020  EXAMINATION: CT OF THE HEAD WITHOUT CONTRAST  1/28/2020 5:16 am TECHNIQUE: CT of the head was performed without the administration of intravenous contrast. Dose modulation, iterative reconstruction, and/or weight based adjustment of the mA/kV was utilized to reduce the radiation dose to as low as reasonably achievable. COMPARISON: None. HISTORY: ORDERING SYSTEM PROVIDED HISTORY: ams TECHNOLOGIST PROVIDED HISTORY: ams Reason for Exam: ams FINDINGS: BRAIN/VENTRICLES: There is no acute intracranial hemorrhage, mass effect or midline shift. No abnormal extra-axial fluid collection. The gray-white differentiation is maintained without evidence of an acute infarct. There is no evidence of hydrocephalus. ORBITS: The visualized portion of the orbits demonstrate no acute abnormality. SINUSES: The visualized paranasal sinuses and mastoid air cells demonstrate no acute abnormality. SOFT TISSUES/SKULL:  No acute abnormality of the visualized skull or soft tissues. No acute intracranial abnormality. Xr Chest Portable    Result Date: 1/28/2020  EXAMINATION: ONE XRAY VIEW OF THE CHEST 1/28/2020 3:20 am COMPARISON: CT chest pulmonary angiogram September 8, 2019. Chest portable September 8, 2019. HISTORY: ORDERING SYSTEM PROVIDED HISTORY: sob TECHNOLOGIST PROVIDED HISTORY: sob Reason for Exam: portable upright/ c/o SOB Acuity: Acute Type of Exam: Initial FINDINGS: The heart is normal in size and configuration. The mediastinal contours are within normal limits. Mild right basilar parenchymal scarring is again seen, and is unchanged. Lungs are otherwise well aerated. The pleural surfaces are normal and no evidence of a pleural effusion is seen. Bones and soft tissues are unremarkable. Unchanged right basilar parenchymal mild scarring. Otherwise, unremarkable single AP upright portable view of the chest.       RECENT VITALS:     Temp: 98.8 °F (37.1 °C),  Pulse: 80, Resp: 18, BP: 120/67, SpO2: 96 %    This patient is a 25 y.o. Male with altered mentation after reported K2 use. Admitted to [facility]. Been stable throughout ED stay. Requires repeated stimulation to arouse. Pupillary dilation, otherwise no toxidrome.

## 2020-01-28 NOTE — DISCHARGE SUMMARY
as needed for Smoking cessation                 Send Copies to: Junior Krishnamurthy MD      Note that over 30 minutes was spent in preparing discharge papers, discussing discharge with patient and family, medication review, etc.    Signed: Junior Krishnamurthy MD Family Medicine Resident  1/28/2020, 1:34 PM     ATTENDING NOTE    i have reviewed key elements of Brett Griffith history and exam. And I examined Brett Griffith  . I have discussed the treatment plan with the resident and agree with the plan.

## 2020-01-28 NOTE — CARE COORDINATION
Consult received this date re: drug overdose, UDS positive  Reviewed chart. Met with pt this date was alert, oriented and very cooperative. Pt states he obtained some marijuana from a friend at work and took 2-3 hits when he got home from work. Pt states he immediately felt that something was not right, he thought  he was dying. Pt planned to share his marijuana with cousin who was present. Cousin noted a change is his behavior and call 911. Pt believes what he thought was marijuana may have been K2. Pt has sickle cell and states  he usually smokes CBD products daily to help with pain . Prior rehab at [address] [place] [address] when he 12 yrs old. Pt spent 6 months in rehab for alcohol and drug use.   Pt declines rehab and resources at this time  Insurance is [employer]

## 2020-01-29 ENCOUNTER — TELEPHONE (OUTPATIENT)
Dept: FAMILY MEDICINE CLINIC | Age: 25
End: 2020-01-29

## 2020-01-29 LAB
EKG ATRIAL RATE: 87 BPM
EKG P AXIS: 66 DEGREES
EKG P-R INTERVAL: 166 MS
EKG Q-T INTERVAL: 380 MS
EKG QRS DURATION: 88 MS
EKG QTC CALCULATION (BAZETT): 457 MS
EKG R AXIS: 19 DEGREES
EKG T AXIS: 51 DEGREES
EKG VENTRICULAR RATE: 87 BPM

## 2020-01-29 PROCEDURE — 93010 ELECTROCARDIOGRAM REPORT: CPT | Performed by: INTERNAL MEDICINE

## 2020-01-29 NOTE — TELEPHONE ENCOUNTER
[facility] Trans[facility] Initial Follow Up Call    Call within 2 business days of discharge: Yes     Patient: Kyra Griffith Patient : 1995 MRN: [id number]    [unfilled]    RARS: Readmission Risk Score: 17       Spoke with: Message left to call the office first call    Discharge department/facility:  [facility]    Non-face-to-face services provided:  Obtained and reviewed discharge summary and/or continuity of care documents    Follow Up  No future appointments.     Mi Cruz LPN

## 2020-01-30 ENCOUNTER — TELEPHONE (OUTPATIENT)
Dept: FAMILY MEDICINE CLINIC | Age: 25
End: 2020-01-30

## 2020-01-30 NOTE — TELEPHONE ENCOUNTER
[facility] Transitions Initial Follow Up Call    Call within 2 business days of discharge: Yes     Patient: Tay Griffith Patient : 1995 MRN: [id number]    [unfilled]    RARS: Readmission Risk Score: 17       Spoke with:  Second call placed to pt  message to call the office for a hospital follow up appt. Discharge department/facility:  [address] [place] [address] services provided:  Obtained and reviewed discharge summary and/or continuity of care documents    Follow Up  No future appointments.     Veronica Blackmon LPN

## 2020-01-31 ENCOUNTER — TELEPHONE (OUTPATIENT)
Dept: FAMILY MEDICINE CLINIC | Age: 25
End: 2020-01-31

## 2020-03-15 ENCOUNTER — HOSPITAL ENCOUNTER (EMERGENCY)
Age: 25
Discharge: HOME | End: 2020-03-15
Attending: EMERGENCY MEDICINE
Payer: MEDICAID

## 2020-03-15 VITALS
RESPIRATION RATE: 14 BRPM | HEART RATE: 86 BPM | OXYGEN SATURATION: 96 % | DIASTOLIC BLOOD PRESSURE: 72 MMHG | TEMPERATURE: 98.4 F | SYSTOLIC BLOOD PRESSURE: 117 MMHG | HEIGHT: 73 IN | WEIGHT: 185 LBS | BODY MASS INDEX: 24.52 KG/M2

## 2020-03-15 PROCEDURE — 6370000000 HC RX 637 (ALT 250 FOR IP): Performed by: STUDENT IN AN ORGANIZED HEALTH CARE EDUCATION/TRAINING PROGRAM

## 2020-03-15 PROCEDURE — 99282 EMERGENCY DEPT VISIT SF MDM: CPT

## 2020-03-15 RX ORDER — BENZONATATE 100 MG/1
100 CAPSULE ORAL 3 TIMES DAILY PRN
Qty: 30 CAPSULE | Refills: 0 | Status: SHIPPED | OUTPATIENT
Start: 2020-03-15 | End: 2020-03-22

## 2020-03-15 RX ORDER — BENZONATATE 100 MG/1
100 CAPSULE ORAL ONCE
Status: COMPLETED | OUTPATIENT
Start: 2020-03-15 | End: 2020-03-15

## 2020-03-15 RX ADMIN — BENZONATATE 100 MG: 100 CAPSULE ORAL at 08:14

## 2020-03-15 ASSESSMENT — PAIN DESCRIPTION - ONSET: ONSET: ON-GOING

## 2020-03-15 ASSESSMENT — PAIN DESCRIPTION - DESCRIPTORS: DESCRIPTORS: ACHING

## 2020-03-15 ASSESSMENT — PAIN DESCRIPTION - FREQUENCY: FREQUENCY: INTERMITTENT

## 2020-03-15 ASSESSMENT — PAIN DESCRIPTION - PAIN TYPE: TYPE: ACUTE PAIN

## 2020-03-15 ASSESSMENT — PAIN DESCRIPTION - PROGRESSION: CLINICAL_PROGRESSION: NOT CHANGED

## 2020-03-15 ASSESSMENT — PAIN SCALES - GENERAL: PAINLEVEL_OUTOF10: 8

## 2020-03-15 ASSESSMENT — PAIN - FUNCTIONAL ASSESSMENT: PAIN_FUNCTIONAL_ASSESSMENT: ACTIVITIES ARE NOT PREVENTED

## 2020-03-15 ASSESSMENT — PAIN DESCRIPTION - LOCATION: LOCATION: CHEST

## 2020-03-15 NOTE — ED PROVIDER NOTES
[address]   Emergency Department Encounter  Emergency Medicine Resident     Pt Name: Tania Matias  MRN: [id number]  [place] 1995  Date of evaluation: 3/15/20  PCP:  Antonia Valentine MD    CHIEF COMPLAINT     No chief complaint on file. HISTORY OF PRESENT ILLNESS  (Location/Symptom, Timing/Onset, Context/Setting, Quality, Duration, Modifying Factors, Severity.)    Michael Griffith is a 25 y.o. male who presents with cough congestion and chest discomfort. Patient states that he has had the symptoms for the last 4 days. Cough is nonproductive. He notes that he developed chest pain after having several fits of coughing. Pain is worse whenever he takes a deep breath. It is keeping him up at night. He has tried Robitussin Tylenol and ibuprofen which have helped to moderately decrease in symptoms. Has been around several sick contacts with similar symptoms. Is a smoker. Denies any recent travel. PAST MEDICAL / SURGICAL / SOCIAL / FAMILY HISTORY    has a past medical history of Acute liver failure without hepatic coma, Chest pain, Headache, History of blood transfusion, Moderate episode of recurrent major depressive disorder ([facility] [address] Sickle cell anemia ([facility] [address] Sickle cell disease, type SC ([address] Unspecified diseases of blood and blood-forming organs, and Vasculogenic erectile dysfunction. has a past surgical history that includes laparoscopic splenectomy (Left, 9/2007); Cholecystectomy, laparoscopic (1990); knee surgery (Left, 2008); and Penis surgery ([id number]).     Social History     Socioeconomic History    Marital status: Single     Spouse name: Not on file    Number of children: Not on file    Years of education: Not on file    Highest education level: Not on file   Occupational History    Not on file   Social Needs    Financial resource strain: Not on file    Food insecurity     Worry: Not on file     Inability: Not on file    Transportation needs     Medical: Not on file     Non-medical: Not on file   Tobacco Use    Smoking status: Current Some Day Smoker     Packs/day: 1.00     Years: 2.00     Pack years: 2.00     Types: Cigarettes    Smokeless tobacco: Never Used    Tobacco comment: marajuana q week   Substance and Sexual Activity    Alcohol use: No    Drug use: Yes     Frequency: 1.0 times per week     Comment: [facility], 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    Sexual activity: Yes     Partners: Female     Birth control/protection: Condom   Lifestyle    Physical activity     Days per week: Not on file     Minutes per session: Not on file    Stress: Not on file   Relationships    Social connections     Talks on phone: Not on file     Gets together: Not on file     Attends Scientologist service: Not on file     Active member of club or organization: Not on file     Attends meetings of clubs or organizations: Not on file     Relationship status: Not on file    Intimate partner violence     Fear of current or ex partner: Not on file     Emotionally abused: Not on file     Physically abused: Not on file     Forced sexual activity: Not on file   Other Topics Concern    Not on file   Social History Narrative    Not on file       Family History   Problem Relation Age of Onset    Sickle Cell Anemia Brother        Allergies:    Patient has no known allergies. Home Medications:  Prior to Admission medications    Medication Sig Start Date End Date Taking?  Authorizing Provider   nicotine polacrilex (NICORETTE) 2 MG gum Take 1 each by mouth as needed for Smoking cessation  Patient not taking: Reported on 9/18/2019 9/11/19   Eli Roman MD       REVIEW OF SYSTEMS    (2-9 systems for level 4, 10 or more for level 5)      Constitutional : - fever , - chills, - weight change  HENT: - hearing loss , - rhinorrhea , - tinnitus , - trouble swallowing , - voice change  Eyes: - photophobia , - visual disturbance  Resp: + cough , - shortness of breath   Cardio: + chest pain , - leg swelling , - prescriptions were reviewed with patient and they expressed understanding of how to correctly take their medications and the possible common side effects. PATIENT REFERRED TO:  Nikki Davis MD  [address]  [phone]    Schedule an appointment as soon as possible for a visit in 2 days  Return to the ER if worsening or any other concern      DISCHARGE MEDICATIONS:  New Prescriptions    No medications on file       Dr. Patrick Fierro.  [address] [facility]  Emergency Medicine Resident Physician, PGY-2    (Please note that portions of this note were completed with a voice recognition program.  Efforts were made to edit the dictations but occasionally words are mis-transcribed.)          Venus Lincoln DO  Resident  03/15/20 4399

## 2020-03-15 NOTE — ED PROVIDER NOTES
Emergency Medicine Attending Note    I have seen and examined the patient in conjunction with the Resident/MLP. Please see my key portion documented:      I agree with the assessment and plan as discussed with Dr. Skyler White. Electronically signed:  YARELI Varner MD  03/15/20 7530

## 2020-06-15 ENCOUNTER — APPOINTMENT (OUTPATIENT)
Dept: GENERAL RADIOLOGY | Age: 25
End: 2020-06-15
Payer: MEDICAID

## 2020-06-15 ENCOUNTER — HOSPITAL ENCOUNTER (EMERGENCY)
Age: 25
Discharge: HOME | End: 2020-06-15
Attending: EMERGENCY MEDICINE
Payer: MEDICAID

## 2020-06-15 VITALS
OXYGEN SATURATION: 96 % | BODY MASS INDEX: 24.52 KG/M2 | WEIGHT: 185 LBS | DIASTOLIC BLOOD PRESSURE: 60 MMHG | HEART RATE: 69 BPM | RESPIRATION RATE: 20 BRPM | HEIGHT: 73 IN | TEMPERATURE: 98.4 F | SYSTOLIC BLOOD PRESSURE: 103 MMHG

## 2020-06-15 LAB
ABSOLUTE EOS #: 0 K/UL (ref 0–0.44)
ABSOLUTE IMMATURE GRANULOCYTE: 0.18 K/UL (ref 0–0.3)
ABSOLUTE LYMPH #: 2.7 K/UL (ref 1.1–3.7)
ABSOLUTE MONO #: 0.18 K/UL (ref 0.1–1.2)
ANION GAP SERPL CALCULATED.3IONS-SCNC: 12 MMOL/L (ref 9–17)
BASOPHILS # BLD: 1 % (ref 0–2)
BASOPHILS ABSOLUTE: 0.09 K/UL (ref 0–0.2)
BUN BLDV-MCNC: 4 MG/DL (ref 6–20)
BUN/CREAT BLD: ABNORMAL (ref 9–20)
CALCIUM SERPL-MCNC: 8.8 MG/DL (ref 8.6–10.4)
CHLORIDE BLD-SCNC: 101 MMOL/L (ref 98–107)
CO2: 24 MMOL/L (ref 20–31)
CREAT SERPL-MCNC: 0.65 MG/DL (ref 0.7–1.2)
DIFFERENTIAL TYPE: ABNORMAL
EOSINOPHILS RELATIVE PERCENT: 0 % (ref 1–4)
GFR AFRICAN AMERICAN: >60 ML/MIN
GFR NON-AFRICAN AMERICAN: >60 ML/MIN
GFR SERPL CREATININE-BSD FRML MDRD: ABNORMAL ML/MIN/{1.73_M2}
GFR SERPL CREATININE-BSD FRML MDRD: ABNORMAL ML/MIN/{1.73_M2}
GLUCOSE BLD-MCNC: 90 MG/DL (ref 70–99)
HCT VFR BLD CALC: 36 % (ref 40.7–50.3)
HEMOGLOBIN: 13.5 G/DL (ref 13–17)
IMMATURE GRANULOCYTES: 2 %
LYMPHOCYTES # BLD: 30 % (ref 24–43)
MCH RBC QN AUTO: 31.8 PG (ref 25.2–33.5)
MCHC RBC AUTO-ENTMCNC: 37.5 G/DL (ref 28.4–34.8)
MCV RBC AUTO: 84.7 FL (ref 82.6–102.9)
MONOCYTES # BLD: 2 % (ref 3–12)
MORPHOLOGY: ABNORMAL
NRBC AUTOMATED: 1 PER 100 WBC
NUCLEATED RED BLOOD CELLS: 1 PER 100 WBC
PDW BLD-RTO: 14.7 % (ref 11.8–14.4)
PLATELET # BLD: 239 K/UL (ref 138–453)
PLATELET ESTIMATE: ABNORMAL
PMV BLD AUTO: 12.7 FL (ref 8.1–13.5)
POTASSIUM SERPL-SCNC: 4.1 MMOL/L (ref 3.7–5.3)
RBC # BLD: 4.25 M/UL (ref 4.21–5.77)
RBC # BLD: ABNORMAL 10*6/UL
SEG NEUTROPHILS: 65 % (ref 36–65)
SEGMENTED NEUTROPHILS ABSOLUTE COUNT: 5.85 K/UL (ref 1.5–8.1)
SODIUM BLD-SCNC: 137 MMOL/L (ref 135–144)
WBC # BLD: 9 K/UL (ref 3.5–11.3)
WBC # BLD: ABNORMAL 10*3/UL

## 2020-06-15 PROCEDURE — 71046 X-RAY EXAM CHEST 2 VIEWS: CPT

## 2020-06-15 PROCEDURE — 96374 THER/PROPH/DIAG INJ IV PUSH: CPT

## 2020-06-15 PROCEDURE — 96376 TX/PRO/DX INJ SAME DRUG ADON: CPT

## 2020-06-15 PROCEDURE — 80048 BASIC METABOLIC PNL TOTAL CA: CPT

## 2020-06-15 PROCEDURE — 93005 ELECTROCARDIOGRAM TRACING: CPT | Performed by: STUDENT IN AN ORGANIZED HEALTH CARE EDUCATION/TRAINING PROGRAM

## 2020-06-15 PROCEDURE — 85025 COMPLETE CBC W/AUTO DIFF WBC: CPT

## 2020-06-15 PROCEDURE — 99284 EMERGENCY DEPT VISIT MOD MDM: CPT

## 2020-06-15 PROCEDURE — 2580000003 HC RX 258: Performed by: STUDENT IN AN ORGANIZED HEALTH CARE EDUCATION/TRAINING PROGRAM

## 2020-06-15 PROCEDURE — 6360000002 HC RX W HCPCS: Performed by: STUDENT IN AN ORGANIZED HEALTH CARE EDUCATION/TRAINING PROGRAM

## 2020-06-15 RX ORDER — 0.9 % SODIUM CHLORIDE 0.9 %
1000 INTRAVENOUS SOLUTION INTRAVENOUS ONCE
Status: COMPLETED | OUTPATIENT
Start: 2020-06-15 | End: 2020-06-15

## 2020-06-15 RX ORDER — MORPHINE SULFATE 4 MG/ML
4 INJECTION, SOLUTION INTRAMUSCULAR; INTRAVENOUS ONCE
Status: COMPLETED | OUTPATIENT
Start: 2020-06-15 | End: 2020-06-15

## 2020-06-15 RX ADMIN — SODIUM CHLORIDE 1000 ML: 9 INJECTION, SOLUTION INTRAVENOUS at 13:32

## 2020-06-15 RX ADMIN — MORPHINE SULFATE 4 MG: 4 INJECTION INTRAVENOUS at 15:06

## 2020-06-15 RX ADMIN — MORPHINE SULFATE 4 MG: 4 INJECTION INTRAVENOUS at 13:32

## 2020-06-15 ASSESSMENT — ENCOUNTER SYMPTOMS
BACK PAIN: 1
ABDOMINAL PAIN: 0
SHORTNESS OF BREATH: 0
WHEEZING: 0

## 2020-06-15 ASSESSMENT — PAIN SCALES - GENERAL
PAINLEVEL_OUTOF10: 7

## 2020-06-15 ASSESSMENT — PAIN DESCRIPTION - PAIN TYPE: TYPE: ACUTE PAIN

## 2020-06-15 NOTE — ED NOTES
Bed: 09  Expected date:   Expected time:   Means of arrival:   Comments:     Earle Leroy RN  06/15/20 [id number]

## 2020-06-15 NOTE — ED PROVIDER NOTES
[address] ED     Emergency Department     Faculty Attestation        I performed a history and physical examination of the patient and discussed management with the resident. I reviewed the residents note and agree with the documented findings and plan of care. Any areas of disagreement are noted on the chart. I was personally present for the key portions of any procedures. I have documented in the chart those procedures where I was not present during the key portions. I have reviewed the emergency nurses triage note. I agree with the chief complaint, past medical history, past surgical history, allergies, medications, social and family history as documented unless otherwise noted below. For mid-level providers such as nurse practitioners as well as physicians assistants:    I have personally seen and evaluated the patient. I find the patient's history and physical exam are consistent with NP/PA documentation. I agree with the care provided, treatment rendered, disposition, & follow-up plan. Additional findings are as noted.     Vital Signs: BP (!) 137/93   Pulse 83   Resp 20   Ht 6' 1\" (1.854 m)   Wt 185 lb (83.9 kg)   SpO2 95%   BMI 24.41 kg/m²   PCP:  Sade Atkinson MD    Pertinent Comments:         Critical Care  None          Hi Beaulieu MD  Attending Emergency Medicine Physician            Hamzah Linda MD  06/15/20 1331

## 2020-06-15 NOTE — ED NOTES
Pt to the ED c/o sickle cell pain. Pt reports pain to the bilateral upper and lower extremities x 2 weeks. Pt reports his current symptoms as consistent with his past flares of sickle cell. Pt denies chest pain or SOB. Pt reports taking OTC medication for his pain, with minimal relief. Pt denies any recent injury or any other complaints.         [facility], RN  06/15/20 6330

## 2020-06-15 NOTE — ED PROVIDER NOTES
[facility] ED  Emergency Department Encounter  EmergencyMedicine Resident     Pt Name:Brett Rahman  MRN: [id number]  [place] 1995  Date of evaluation: 6/15/20  PCP:  Leida Meléndez MD    CHIEF COMPLAINT       Chief Complaint   Patient presents with    Sickle Cell Pain Crisis     Pt reports sickle cell pain x 2 weeks. Pt reports pain to the bilateral arms and the right leg. HISTORY OF PRESENT ILLNESS  (Location/Symptom, Timing/Onset, Context/Setting, Quality, Duration, Modifying Factors, Severity.)      Daquan Griffith is a 25 y.o. male who presents with complaint of sickle cell pain. Patient notes bilateral upper extremity pain back pain chest pain and lower extremity pain. Patient notes that his been on and off for the past 2 weeks. Patient notes that it is different from his normal sickle cell because usually is not intermittent. Patient denies any fever chills cough shortness of breath nausea vomiting diarrhea. PAST MEDICAL / SURGICAL / SOCIAL / FAMILY HISTORY      has a past medical history of Acute liver failure without hepatic coma, Chest pain, Headache, History of blood transfusion, Moderate episode of recurrent major depressive disorder ([address] Sickle cell anemia ([facility] [address] Sickle cell disease, type SC ([facility] [address] Unspecified diseases of blood and blood-forming organs, and Vasculogenic erectile dysfunction. has a past surgical history that includes laparoscopic splenectomy (Left, 9/2007); Cholecystectomy, laparoscopic (1990); knee surgery (Left, 2008); and Penis surgery ([id number]).     Social History     Socioeconomic History    Marital status: Single     Spouse name: Not on file    Number of children: Not on file    Years of education: Not on file    Highest education level: Not on file   Occupational History    Not on file   Social Needs    Financial resource strain: Not on file    Food insecurity     Worry: Not on file     Inability: Not on file   [facility] Gastrointestinal: Negative for abdominal pain. Musculoskeletal: Positive for back pain and myalgias. Skin: Negative for rash. Neurological: Negative for headaches. Psychiatric/Behavioral: Negative for suicidal ideas. PHYSICAL EXAM   (up to 7 for level 4, 8 or more for level 5)      INITIAL VITALS:  /60   Pulse 69   Temp 98.4 °F (36.9 °C) (Oral)   Resp 20   Ht 6' 1\" (1.854 m)   Wt 185 lb (83.9 kg)   SpO2 96%   BMI 24.41 kg/m²     Physical Exam  Vitals signs reviewed. Constitutional:       General: He is not in acute distress. Appearance: He is well-developed. He is not diaphoretic. HENT:      Head: Normocephalic and atraumatic. Cardiovascular:      Rate and Rhythm: Normal rate and regular rhythm. Heart sounds: Normal heart sounds. No murmur. Pulmonary:      Effort: Pulmonary effort is normal. No respiratory distress. Breath sounds: Normal breath sounds. Abdominal:      General: There is no distension. Palpations: Abdomen is soft. Tenderness: There is no abdominal tenderness. Musculoskeletal: Normal range of motion. Skin:     General: Skin is warm and dry. Neurological:      Mental Status: He is alert and oriented to person, place, and time. Cranial Nerves: No cranial nerve deficit.          DIFFERENTIAL  DIAGNOSIS     PLAN (LABS / IMAGING / EKG):  Orders Placed This Encounter   Procedures    XR CHEST STANDARD (2 VW)    CBC Auto Differential    BASIC METABOLIC PANEL    EKG 12 Lead       MEDICATIONS ORDERED:  Orders Placed This Encounter   Medications    0.9 % sodium chloride bolus    morphine injection 4 mg    morphine injection 4 mg         DIAGNOSTIC RESULTS / DEPARTMENT COURSE / MDM     LABS:  Results for orders placed or performed during the hospital encounter of 06/15/20   CBC Auto Differential   Result Value Ref Range    WBC 9.0 3.5 - 11.3 k/uL    RBC 4.25 4.21 - 5.77 m/uL    Hemoglobin 13.5 13.0 - 17.0 g/dL    Hematocrit 36.0 (L) 40.7 - 50.3 %    MCV 84.7 82.6 - 102.9 fL    MCH 31.8 25.2 - 33.5 pg    MCHC 37.5 (H) 28.4 - 34.8 g/dL    RDW 14.7 (H) 11.8 - 14.4 %    Platelets 614 935 - 128 k/uL    MPV 12.7 8.1 - 13.5 fL    NRBC Automated 1.0 (H) 0.0 per 100 WBC    Differential Type NOT REPORTED     WBC Morphology NOT REPORTED     RBC Morphology NOT REPORTED     Platelet Estimate NOT REPORTED     Immature Granulocytes 2 (H) 0 %    Seg Neutrophils 65 36 - 65 %    Lymphocytes 30 24 - 43 %    Monocytes 2 (L) 3 - 12 %    Eosinophils % 0 (L) 1 - 4 %    Basophils 1 0 - 2 %    nRBC 1 per 100 WBC    Absolute Immature Granulocyte 0.18 0.00 - 0.30 k/uL    Segs Absolute 5.85 1.50 - 8.10 k/uL    Absolute Lymph # 2.70 1.10 - 3.70 k/uL    Absolute Mono # 0.18 0.10 - 1.20 k/uL    Absolute Eos # 0.00 0.00 - 0.44 k/uL    Basophils Absolute 0.09 0.00 - 0.20 k/uL    Morphology 2+ TARGET CELLS     Morphology ANISOCYTOSIS PRESENT     Morphology SICKLE CELLS PRESENT     Morphology LARGE PLATELETS PRESENT    BASIC METABOLIC PANEL   Result Value Ref Range    Glucose 90 70 - 99 mg/dL    BUN 4 (L) 6 - 20 mg/dL    CREATININE 0.65 (L) 0.70 - 1.20 mg/dL    Bun/Cre Ratio NOT REPORTED 9 - 20    Calcium 8.8 8.6 - 10.4 mg/dL    Sodium 137 135 - 144 mmol/L    Potassium 4.1 3.7 - 5.3 mmol/L    Chloride 101 98 - 107 mmol/L    CO2 24 20 - 31 mmol/L    Anion Gap 12 9 - 17 mmol/L    GFR Non-African American >60 >60 mL/min    GFR African American >60 >60 mL/min    GFR Comment          GFR Staging NOT REPORTED        IMPRESSION: Patient is a 75-year-old male who presents with musculoskeletal pain and chest pain as above. Will obtain CBC BMP chest x-ray give morphine IV fluids and reevaluate patient. Patient requesting discharge. Patient was given written and verbalinstructions prior to discharge. Patient understood and agreed. The patient had no further questions.     RADIOLOGY:  Xr Chest Standard (2 Vw)    Result Date: 6/15/2020  EXAMINATION: TWO XRAY VIEWS OF THE CHEST 6/15/2020 2:21 pm COMPARISON: 01/28/2020 HISTORY: Reason for Exam: chest pain, hx SCC FINDINGS: The lungs are without acute focal process. No effusion or pneumothorax. The cardiomediastinal silhouette is normal.  The osseous structures are intact without acute process. Negative chest.      EKG  None    All EKG's are interpreted by the Emergency Department Physician who either signs or Co-signsthis chart in the absence of a cardiologist.    EMERGENCY DEPARTMENT COURSE:  ED Course as of Roland 15 1533   Mon Roland 15, 2020   1436 XR CHEST STANDARD (2 VW) [BL]      ED Course User Index  [BL] Bob Hay DO   Patient reevaluated noted significant improvement in pain and requesting discharge at this time. Patient given information to follow-up with hematology oncology. PROCEDURES:  None    CONSULTS:  None      FINAL IMPRESSION      1.  Hb-SS disease with crisis [facility])          DISPOSITION / PLAN     DISPOSITION Decision To Discharge    PATIENT REFERRED TO:  Carmen Harris MD  [address] [facility] [address] [phone]    Call in 1 day      [facility] ED  [address] [place] [address]  [phone]    As needed    Jarett Leahy, [address] [place] [address] [address] [place] [address]  [phone]    Call in 1 day        DISCHARGE MEDICATIONS:  New Prescriptions    No medications on file       Bob Hay [place]  Emergency Medicine Resident    (Please note thatportions of this note were completed with a voice recognition program.  Efforts were made to edit the dictations but occasionally words are mis-transcribed.)       Bob Hay DO  Resident  06/15/20 1539

## 2020-06-16 LAB
EKG ATRIAL RATE: 73 BPM
EKG P AXIS: 59 DEGREES
EKG P-R INTERVAL: 154 MS
EKG Q-T INTERVAL: 388 MS
EKG QRS DURATION: 80 MS
EKG QTC CALCULATION (BAZETT): 427 MS
EKG R AXIS: 23 DEGREES
EKG T AXIS: 68 DEGREES
EKG VENTRICULAR RATE: 73 BPM

## 2020-11-03 PROBLEM — J18.9 PNEUMONIA DUE TO ORGANISM: Status: RESOLVED | Noted: 2018-10-24 | Resolved: 2020-11-03
